# Patient Record
Sex: FEMALE | Race: WHITE | NOT HISPANIC OR LATINO | Employment: OTHER | URBAN - METROPOLITAN AREA
[De-identification: names, ages, dates, MRNs, and addresses within clinical notes are randomized per-mention and may not be internally consistent; named-entity substitution may affect disease eponyms.]

---

## 2018-02-27 ENCOUNTER — TELEPHONE (OUTPATIENT)
Dept: URGENT CARE | Facility: CLINIC | Age: 38
End: 2018-02-27

## 2018-02-27 ENCOUNTER — APPOINTMENT (OUTPATIENT)
Dept: CT IMAGING | Facility: HOSPITAL | Age: 38
End: 2018-02-27

## 2018-02-27 ENCOUNTER — HOSPITAL ENCOUNTER (EMERGENCY)
Facility: HOSPITAL | Age: 38
Discharge: HOME | End: 2018-02-27
Attending: EMERGENCY MEDICINE | Admitting: EMERGENCY MEDICINE

## 2018-02-27 VITALS
DIASTOLIC BLOOD PRESSURE: 97 MMHG | HEART RATE: 111 BPM | SYSTOLIC BLOOD PRESSURE: 142 MMHG | RESPIRATION RATE: 20 BRPM | HEIGHT: 70 IN | BODY MASS INDEX: 24.34 KG/M2 | TEMPERATURE: 98 F | OXYGEN SATURATION: 100 % | WEIGHT: 170 LBS

## 2018-02-27 DIAGNOSIS — H92.01 OTALGIA, RIGHT: Primary | ICD-10-CM

## 2018-02-27 LAB
ANION GAP SERPL CALCULATED.3IONS-SCNC: 9 MMOL/L (ref 3–11)
BASOPHILS # BLD AUTO: 0.02 10*3/MM3 (ref 0–0.2)
BASOPHILS NFR BLD AUTO: 0.3 % (ref 0–1)
BUN BLD-MCNC: 15 MG/DL (ref 9–23)
BUN/CREAT SERPL: 18.8 (ref 7–25)
CALCIUM SPEC-SCNC: 9.6 MG/DL (ref 8.7–10.4)
CHLORIDE SERPL-SCNC: 101 MMOL/L (ref 99–109)
CO2 SERPL-SCNC: 26 MMOL/L (ref 20–31)
CREAT BLD-MCNC: 0.8 MG/DL (ref 0.6–1.3)
DEPRECATED RDW RBC AUTO: 45.7 FL (ref 37–54)
EOSINOPHIL # BLD AUTO: 0.14 10*3/MM3 (ref 0–0.3)
EOSINOPHIL NFR BLD AUTO: 2.1 % (ref 0–3)
ERYTHROCYTE [DISTWIDTH] IN BLOOD BY AUTOMATED COUNT: 12.8 % (ref 11.3–14.5)
GFR SERPL CREATININE-BSD FRML MDRD: 81 ML/MIN/1.73
GLUCOSE BLD-MCNC: 76 MG/DL (ref 70–100)
HCT VFR BLD AUTO: 43.3 % (ref 34.5–44)
HGB BLD-MCNC: 14.6 G/DL (ref 11.5–15.5)
HOLD SPECIMEN: NORMAL
HOLD SPECIMEN: NORMAL
IMM GRANULOCYTES # BLD: 0.01 10*3/MM3 (ref 0–0.03)
IMM GRANULOCYTES NFR BLD: 0.2 % (ref 0–0.6)
LYMPHOCYTES # BLD AUTO: 1.61 10*3/MM3 (ref 0.6–4.8)
LYMPHOCYTES NFR BLD AUTO: 24.2 % (ref 24–44)
MCH RBC QN AUTO: 32.8 PG (ref 27–31)
MCHC RBC AUTO-ENTMCNC: 33.7 G/DL (ref 32–36)
MCV RBC AUTO: 97.3 FL (ref 80–99)
MONOCYTES # BLD AUTO: 0.45 10*3/MM3 (ref 0–1)
MONOCYTES NFR BLD AUTO: 6.8 % (ref 0–12)
NEUTROPHILS # BLD AUTO: 4.41 10*3/MM3 (ref 1.5–8.3)
NEUTROPHILS NFR BLD AUTO: 66.4 % (ref 41–71)
PLATELET # BLD AUTO: 269 10*3/MM3 (ref 150–450)
PMV BLD AUTO: 9.4 FL (ref 6–12)
POTASSIUM BLD-SCNC: 4.1 MMOL/L (ref 3.5–5.5)
RBC # BLD AUTO: 4.45 10*6/MM3 (ref 3.89–5.14)
SODIUM BLD-SCNC: 136 MMOL/L (ref 132–146)
WBC NRBC COR # BLD: 6.64 10*3/MM3 (ref 3.5–10.8)
WHOLE BLOOD HOLD SPECIMEN: NORMAL
WHOLE BLOOD HOLD SPECIMEN: NORMAL

## 2018-02-27 PROCEDURE — 70481 CT ORBIT/EAR/FOSSA W/DYE: CPT

## 2018-02-27 PROCEDURE — 25010000002 ONDANSETRON PER 1 MG: Performed by: EMERGENCY MEDICINE

## 2018-02-27 PROCEDURE — 96375 TX/PRO/DX INJ NEW DRUG ADDON: CPT

## 2018-02-27 PROCEDURE — 96374 THER/PROPH/DIAG INJ IV PUSH: CPT

## 2018-02-27 PROCEDURE — 85025 COMPLETE CBC W/AUTO DIFF WBC: CPT | Performed by: EMERGENCY MEDICINE

## 2018-02-27 PROCEDURE — 25010000002 FENTANYL CITRATE (PF) 100 MCG/2ML SOLUTION: Performed by: EMERGENCY MEDICINE

## 2018-02-27 PROCEDURE — 0 IOPAMIDOL 61 % SOLUTION: Performed by: EMERGENCY MEDICINE

## 2018-02-27 PROCEDURE — 80048 BASIC METABOLIC PNL TOTAL CA: CPT | Performed by: EMERGENCY MEDICINE

## 2018-02-27 PROCEDURE — 99283 EMERGENCY DEPT VISIT LOW MDM: CPT

## 2018-02-27 RX ORDER — SODIUM CHLORIDE 0.9 % (FLUSH) 0.9 %
10 SYRINGE (ML) INJECTION AS NEEDED
Status: DISCONTINUED | OUTPATIENT
Start: 2018-02-27 | End: 2018-02-27 | Stop reason: HOSPADM

## 2018-02-27 RX ORDER — HYDROCODONE BITARTRATE AND ACETAMINOPHEN 5; 325 MG/1; MG/1
1 TABLET ORAL EVERY 6 HOURS PRN
Qty: 10 TABLET | Refills: 0 | Status: SHIPPED | OUTPATIENT
Start: 2018-02-27 | End: 2018-03-08

## 2018-02-27 RX ORDER — ONDANSETRON 2 MG/ML
4 INJECTION INTRAMUSCULAR; INTRAVENOUS ONCE
Status: COMPLETED | OUTPATIENT
Start: 2018-02-27 | End: 2018-02-27

## 2018-02-27 RX ORDER — FENTANYL CITRATE 50 UG/ML
50 INJECTION, SOLUTION INTRAMUSCULAR; INTRAVENOUS ONCE
Status: COMPLETED | OUTPATIENT
Start: 2018-02-27 | End: 2018-02-27

## 2018-02-27 RX ADMIN — FENTANYL CITRATE 50 MCG: 50 INJECTION INTRAMUSCULAR; INTRAVENOUS at 15:49

## 2018-02-27 RX ADMIN — IOPAMIDOL 50 ML: 612 INJECTION, SOLUTION INTRAVENOUS at 16:22

## 2018-02-27 RX ADMIN — ONDANSETRON 4 MG: 2 INJECTION INTRAMUSCULAR; INTRAVENOUS at 15:49

## 2018-02-27 NOTE — TELEPHONE ENCOUNTER
PT. Phoned to state she isn't feeling any better and ask for BUC to make a referral to ENT. Per Pt insurance her PCP would have to make the referral. Pt has not established a PCP as of yet but does have an appt. Set for April. Pt was given info. To [place] [facility] @ [address]. Phone [phone]. To set her own appt.

## 2018-03-08 ENCOUNTER — OFFICE VISIT (OUTPATIENT)
Dept: INTERNAL MEDICINE | Facility: CLINIC | Age: 38
End: 2018-03-08

## 2018-03-08 VITALS
SYSTOLIC BLOOD PRESSURE: 162 MMHG | HEART RATE: 84 BPM | RESPIRATION RATE: 18 BRPM | WEIGHT: 191 LBS | DIASTOLIC BLOOD PRESSURE: 108 MMHG | TEMPERATURE: 97.9 F | HEIGHT: 70 IN | BODY MASS INDEX: 27.35 KG/M2

## 2018-03-08 DIAGNOSIS — I10 ESSENTIAL HYPERTENSION: Primary | ICD-10-CM

## 2018-03-08 LAB
ALBUMIN SERPL-MCNC: 4.2 G/DL (ref 3.2–4.8)
ALBUMIN/GLOB SERPL: 1.4 G/DL (ref 1.5–2.5)
ALP SERPL-CCNC: 73 U/L (ref 25–100)
ALT SERPL W P-5'-P-CCNC: 11 U/L (ref 7–40)
ANION GAP SERPL CALCULATED.3IONS-SCNC: 5 MMOL/L (ref 3–11)
ARTICHOKE IGE QN: 125 MG/DL (ref 0–130)
AST SERPL-CCNC: 14 U/L (ref 0–33)
BILIRUB SERPL-MCNC: 0.3 MG/DL (ref 0.3–1.2)
BUN BLD-MCNC: 13 MG/DL (ref 9–23)
BUN/CREAT SERPL: 16.3 (ref 7–25)
CALCIUM SPEC-SCNC: 9.1 MG/DL (ref 8.7–10.4)
CHLORIDE SERPL-SCNC: 106 MMOL/L (ref 99–109)
CHOLEST SERPL-MCNC: 178 MG/DL (ref 0–200)
CO2 SERPL-SCNC: 28 MMOL/L (ref 20–31)
CREAT BLD-MCNC: 0.8 MG/DL (ref 0.6–1.3)
GFR SERPL CREATININE-BSD FRML MDRD: 81 ML/MIN/1.73
GLOBULIN UR ELPH-MCNC: 2.9 GM/DL
GLUCOSE BLD-MCNC: 84 MG/DL (ref 70–100)
HDLC SERPL-MCNC: 64 MG/DL (ref 40–60)
POTASSIUM BLD-SCNC: 4.6 MMOL/L (ref 3.5–5.5)
PROT SERPL-MCNC: 7.1 G/DL (ref 5.7–8.2)
SODIUM BLD-SCNC: 139 MMOL/L (ref 132–146)
T4 FREE SERPL-MCNC: 1.2 NG/DL (ref 0.89–1.76)
TRIGL SERPL-MCNC: 122 MG/DL (ref 0–150)
TSH SERPL DL<=0.05 MIU/L-ACNC: 1.76 MIU/ML (ref 0.35–5.35)

## 2018-03-08 PROCEDURE — 99203 OFFICE O/P NEW LOW 30 MIN: CPT | Performed by: INTERNAL MEDICINE

## 2018-03-08 PROCEDURE — 84443 ASSAY THYROID STIM HORMONE: CPT | Performed by: INTERNAL MEDICINE

## 2018-03-08 PROCEDURE — 80061 LIPID PANEL: CPT | Performed by: INTERNAL MEDICINE

## 2018-03-08 PROCEDURE — 82088 ASSAY OF ALDOSTERONE: CPT | Performed by: INTERNAL MEDICINE

## 2018-03-08 PROCEDURE — 36415 COLL VENOUS BLD VENIPUNCTURE: CPT | Performed by: INTERNAL MEDICINE

## 2018-03-08 PROCEDURE — 84439 ASSAY OF FREE THYROXINE: CPT | Performed by: INTERNAL MEDICINE

## 2018-03-08 PROCEDURE — 84244 ASSAY OF RENIN: CPT | Performed by: INTERNAL MEDICINE

## 2018-03-08 PROCEDURE — 80053 COMPREHEN METABOLIC PANEL: CPT | Performed by: INTERNAL MEDICINE

## 2018-03-08 RX ORDER — ZIPRASIDONE HYDROCHLORIDE 40 MG/1
1 CAPSULE ORAL DAILY
Refills: 5 | COMMUNITY
Start: 2018-02-12 | End: 2022-10-25

## 2018-03-08 RX ORDER — DEXTROAMPHETAMINE SULFATE 30 MG/1
1 TABLET ORAL 2 TIMES DAILY
Refills: 0 | COMMUNITY
Start: 2018-02-15 | End: 2020-01-22

## 2018-03-08 RX ORDER — LOSARTAN POTASSIUM 50 MG/1
50 TABLET ORAL DAILY
Qty: 30 TABLET | Refills: 2 | Status: SHIPPED | OUTPATIENT
Start: 2018-03-08 | End: 2018-03-26 | Stop reason: SDUPTHER

## 2018-03-08 RX ORDER — VALACYCLOVIR HYDROCHLORIDE 1 G/1
1 TABLET, FILM COATED ORAL 3 TIMES DAILY PRN
COMMUNITY
Start: 2018-03-07 | End: 2018-05-31

## 2018-03-08 RX ORDER — ACYCLOVIR 50 MG/G
1 OINTMENT TOPICAL AS NEEDED
COMMUNITY
Start: 2018-03-07 | End: 2018-11-06 | Stop reason: SDUPTHER

## 2018-03-08 NOTE — PROGRESS NOTES
Chief Complaint   Patient presents with   • NEW PATIENT, [facility]     B/P RUNNING HIGH, COUGH       History of Present Illness    The patient presents for a follow-up related to hypertension. The patient reports that she has had no headaches, chest pain, dyspnea, edema, syncope, blurred vision or palpitations. She states that she is taking her medication as prescribed. She is not having medication side effects. She does  check her blood pressures at home.  The readings are elevated.  She denies excessive salt or caffeine.    Review of Systems    GENERAL- Denies Fever, Chills, Sweats, Weakness or Malaise.    HEENT- Denies Eye Pain, Eye Drainage, Nasal Discharge, Sore Throat, Ear Pain, Ear Drainage, Decreased Vision, Sinus Pain, Nasal Congestion, Decreased Hearing, Visual Disturbance, Diplopia or Tinnitus.    CARDIOVASCULAR- Denies Claudication.    GASTROINTESTINAL- Denies Abdominal Pain, Nausea, Vomiting, Diarrhea, Blood per Rectum, Constipation or Heartburn.    PULMONARY- Denies Wheezing, Sputum Production, Cough, Hemoptysis or Pleuritic Chest Pain.    ENDOCRINE- Denies Cold Intolerance, Depression, Fatigue, Hair Loss, Heat Intolerance, Memory Loss, Polydypsia, Polyphagia, Polyuria, Sleep Disturbance, Weight Gain or Weight Loss.    Medications      Current Outpatient Prescriptions:   •  acyclovir (ZOVIRAX) 5 % ointment, Apply 1 application topically As Needed., Disp: , Rfl:   •  Dextroamphetamine Sulfate (ZENZEDI PO), Take 1 tablet by mouth 2 (Two) Times a Day., Disp: , Rfl:   •  hydrochlorothiazide (HYDRODIURIL) 50 MG tablet, Take 50 mg by mouth Daily., Disp: , Rfl:   •  lisinopril (PRINIVIL,ZESTRIL) 20 MG tablet, Take 20 mg by mouth Daily., Disp: , Rfl:   •  PROAIR  (90 Base) MCG/ACT inhaler, Inhale 2 puffs Every 6 (Six) Hours As Needed., Disp: , Rfl:   •  valACYclovir (VALTREX) 1000 MG tablet, Take 1 tablet by mouth 3 (Three) Times a Day As Needed., Disp: , Rfl:   •  ZENZEDI 30 MG tablet, Take 1  "tablet by mouth 2 (Two) Times a Day., Disp: , Rfl: 0  •  ziprasidone (GEODON) 40 MG capsule, Take 1 capsule by mouth 2 (Two) Times a Day., Disp: , Rfl: 5  •  fluticasone (FLONASE) 50 MCG/ACT nasal spray, 2 sprays into each nostril Daily. (Patient taking differently: 2 sprays into each nostril Daily As Needed.), Disp: 1 bottle, Rfl: 0     Allergies    Allergies   Allergen Reactions   • Lamictal [Lamotrigine] Anaphylaxis   • Rocephin [Ceftriaxone] Hives   • Tetracyclines & Related Other (See Comments)     Low h&h       Problem List    There is no problem list on file for this patient.    Past Medical History:   Diagnosis Date   • ADHD (attention deficit hyperactivity disorder)    • Asthma    • Bipolar 1 disorder    • Hemorrhage    • Herpes    • Hypertension    • Migraine    • Urinary tract infection      Past Surgical History:   Procedure Laterality Date   • CHOLECYSTECTOMY     • HYSTERECTOMY     • OOPHORECTOMY  2015   • ROTATOR CUFF REPAIR     • ULNAR NERVE REPAIR       Social History     Social History   • Marital status:      Social History Main Topics   • Smoking status: Current Every Day Smoker     Start date: 1998   • Smokeless tobacco: Never Used   • Alcohol use No   • Drug use: Unknown     Other Topics Concern   • Not on file       Medications, Allergies, Problems List and Past History were reviewed and updated.    Physical Examination    BP (!) 162/108 (BP Location: Left arm, Patient Position: Sitting, Cuff Size: Adult)  Pulse 84  Temp 97.9 °F (36.6 °C) (Temporal Artery )   Resp 18  Ht 176.5 cm (69.5\")  Wt 86.6 kg (191 lb)  LMP  (LMP Unknown) Comment: NO PAP SINCE HYSTERECTOMY  Breastfeeding? No  BMI 27.8 kg/m2    HEENT: Head- Normocephalic Atraumatic. Facies- Within normal limits. Pinnas- Normal texture and shape bilaterally. Canals- Normal bilaterally. TMs- Normal bilaterally. Nares- Patent bilaterally. Nasal Septum- is normal. There is no tenderness to palpation over the frontal or maxillary " sinuses. Lids- Normal bilaterally. Conjunctiva- Clear bilaterally. Sclera- Anicteric bilaterally. Oropharynx- Moist with no lesions. Tonsils- No enlargement, erythema or exudate.    Neck: Thyroid- non enlarged, symmetric and has no nodules. No bruits are detected. ROM- Normal Range of Motion with no rigidity.    Lungs: Auscultation- Clear to auscultation bilaterally. There are no retractions, clubbing or cyanosis. The Expiratory to Inspiratory ratio is equal.    Cardiovascular: There are no carotid bruits. Heart- Normal Rate with Regular rhythm and no murmurs. There are no gallops. There are no rubs. In the lower extremities there is no edema. The upper extremities do not have edema.    Abdomen: Soft, benign, non-tender with no masses, hernias, organomegaly or scars.    Impression and Assessment    Essential Hypertension.    Plan    Essential Hypertension Plan: She was encouraged to decrease caffeine intake. Instructions were given to eat a low salt diet. The medications were adjusted as noted.    Bebe was seen today for new patient, establish care.    Diagnoses and all orders for this visit:    Essential hypertension  -     Comprehensive Metabolic Panel  -     Lipid Panel  -     TSH  -     T4, Free  -     Renin Direct Assay  -     Aldosterone  -     losartan (COZAAR) 50 MG tablet; Take 1 tablet by mouth Daily.        Return to Office    The patient was instructed to return for follow-up in 2 weeks.    The patient was instructed to return sooner if the condition changes, worsens, or doesn't resolve.

## 2018-03-12 PROBLEM — I10 ESSENTIAL HYPERTENSION: Status: ACTIVE | Noted: 2018-03-12

## 2018-03-12 LAB — RENIN PLAS-CCNC: 1.22 NG/ML/HR (ref 0.17–5.38)

## 2018-03-13 LAB — ALDOST SERPL-MCNC: 5.9 NG/DL (ref 0–30)

## 2018-03-26 ENCOUNTER — HOSPITAL ENCOUNTER (OUTPATIENT)
Dept: GENERAL RADIOLOGY | Facility: HOSPITAL | Age: 38
Discharge: HOME | End: 2018-03-26
Attending: INTERNAL MEDICINE | Admitting: INTERNAL MEDICINE

## 2018-03-26 ENCOUNTER — OFFICE VISIT (OUTPATIENT)
Dept: INTERNAL MEDICINE | Facility: CLINIC | Age: 38
End: 2018-03-26

## 2018-03-26 ENCOUNTER — TELEPHONE (OUTPATIENT)
Dept: INTERNAL MEDICINE | Facility: CLINIC | Age: 38
End: 2018-03-26

## 2018-03-26 VITALS
TEMPERATURE: 97.7 F | HEART RATE: 76 BPM | DIASTOLIC BLOOD PRESSURE: 100 MMHG | RESPIRATION RATE: 16 BRPM | WEIGHT: 192 LBS | SYSTOLIC BLOOD PRESSURE: 156 MMHG | BODY MASS INDEX: 27.95 KG/M2

## 2018-03-26 DIAGNOSIS — R05.9 COUGH: ICD-10-CM

## 2018-03-26 DIAGNOSIS — I10 ESSENTIAL HYPERTENSION: Primary | ICD-10-CM

## 2018-03-26 DIAGNOSIS — J20.9 ACUTE BRONCHITIS, UNSPECIFIED ORGANISM: ICD-10-CM

## 2018-03-26 PROCEDURE — 99214 OFFICE O/P EST MOD 30 MIN: CPT | Performed by: INTERNAL MEDICINE

## 2018-03-26 PROCEDURE — 71046 X-RAY EXAM CHEST 2 VIEWS: CPT

## 2018-03-26 RX ORDER — AZITHROMYCIN 250 MG/1
TABLET, FILM COATED ORAL
Qty: 6 TABLET | Refills: 0 | Status: SHIPPED | OUTPATIENT
Start: 2018-03-26 | End: 2018-03-26

## 2018-03-26 RX ORDER — SULFAMETHOXAZOLE AND TRIMETHOPRIM 800; 160 MG/1; MG/1
1 TABLET ORAL 2 TIMES DAILY
Qty: 20 TABLET | Refills: 0 | Status: SHIPPED | OUTPATIENT
Start: 2018-03-26 | End: 2018-04-05

## 2018-03-26 RX ORDER — HYDROCHLOROTHIAZIDE 50 MG/1
50 TABLET ORAL DAILY
Qty: 30 TABLET | Refills: 5 | Status: SHIPPED | OUTPATIENT
Start: 2018-03-26 | End: 2018-12-26 | Stop reason: SDUPTHER

## 2018-03-26 RX ORDER — LOSARTAN POTASSIUM 50 MG/1
75 TABLET ORAL DAILY
Qty: 45 TABLET | Refills: 2 | Status: SHIPPED | OUTPATIENT
Start: 2018-03-26 | End: 2018-09-12

## 2018-03-26 RX ORDER — BENZONATATE 200 MG/1
200 CAPSULE ORAL 3 TIMES DAILY PRN
Qty: 30 CAPSULE | Refills: 0 | Status: SHIPPED | OUTPATIENT
Start: 2018-03-26 | End: 2018-04-17

## 2018-03-26 RX ORDER — BROMPHENIRAMINE MALEATE, PSEUDOEPHEDRINE HYDROCHLORIDE, AND DEXTROMETHORPHAN HYDROBROMIDE 2; 30; 10 MG/5ML; MG/5ML; MG/5ML
5 SYRUP ORAL 4 TIMES DAILY PRN
Qty: 118 ML | Refills: 0 | Status: SHIPPED | OUTPATIENT
Start: 2018-03-26 | End: 2018-04-17

## 2018-03-26 NOTE — TELEPHONE ENCOUNTER
Change to Bromfed DM 1 tsp po QID prn cough and congestion.  Disp 1 bottle  NR  Luis Whitman MD  1:27 PM  03/26/18

## 2018-03-26 NOTE — PROGRESS NOTES
Chief Complaint   Patient presents with   • Follow-up     2 WEEK FOLLOW UP B/P       History of Present Illness      The patient presents for a follow-up related to hypertension. The patient reports that she has had no headaches, chest pain, dyspnea, edema, syncope, blurred vision or palpitations. She states that she is taking her medication as prescribed. She is not having medication side effects. She states that she is under a lot of stress.    The patient presents with a 2 month history of a productive cough. The sputum is thick and yellow. There are no other associated symptoms, including wheezing, fever, facial pain, eye drainage, ear pain, ear drainage, nasal congestion, rhinorrhea, nausea, vomiting, diarrhea, chills, decreased appetite, abdominal pain, sore throat, myalgias or a rash. The patient has not had hemoptysis. The patient is not exposed to cigarette smoke. The patient has not tried anything for treatment of this illness.     Review of Systems    GENERAL- Denies Unexplained Weight Loss, Sweats, Fatigue, Weakness or Malaise.    CARDIOVASCULAR- Denies Claudication.    GASTROINTESTINAL- Denies Blood per Rectum, Constipation or Heartburn.    PULMONARY- Reports: Cough. Denies: Sputum Production.    Medications      Current Outpatient Prescriptions:   •  acyclovir (ZOVIRAX) 5 % ointment, Apply 1 application topically As Needed., Disp: , Rfl:   •  fluticasone (FLONASE) 50 MCG/ACT nasal spray, 2 sprays into each nostril Daily. (Patient taking differently: 2 sprays into each nostril Daily As Needed.), Disp: 1 bottle, Rfl: 0  •  hydrochlorothiazide (HYDRODIURIL) 50 MG tablet, Take 50 mg by mouth Daily., Disp: , Rfl:   •  losartan (COZAAR) 50 MG tablet, Take 1 tablet by mouth Daily., Disp: 30 tablet, Rfl: 2  •  PROAIR  (90 Base) MCG/ACT inhaler, Inhale 2 puffs Every 6 (Six) Hours As Needed., Disp: , Rfl:   •  valACYclovir (VALTREX) 1000 MG tablet, Take 1 tablet by mouth 3 (Three) Times a Day As Needed.,  Disp: , Rfl:   •  ZENZEDI 30 MG tablet, Take 1 tablet by mouth 2 (Two) Times a Day., Disp: , Rfl: 0  •  ziprasidone (GEODON) 40 MG capsule, Take 1 capsule by mouth 2 (Two) Times a Day., Disp: , Rfl: 5     Allergies    Allergies   Allergen Reactions   • Lamictal [Lamotrigine] Anaphylaxis   • Rocephin [Ceftriaxone] Hives   • Tetracyclines & Related Other (See Comments)     Low h&h       Problem List    Patient Active Problem List   Diagnosis   • Essential hypertension       Medications, Allergies, Problems List and Past History were reviewed and updated.    Physical Examination    /100 (BP Location: Left arm, Patient Position: Sitting, Cuff Size: Adult)   Pulse 76   Temp 97.7 °F (36.5 °C) (Temporal Artery )   Resp 16   Wt 87.1 kg (192 lb)   LMP  (LMP Unknown) Comment: NO PAP SINCE HYSTERECTOMY  Breastfeeding? No   BMI 27.95 kg/m²     HEENT: Head- Normocephalic Atraumatic. Facies- Within normal limits. Pinnas- Normal texture and shape bilaterally. Canals- Normal bilaterally. TMs- Normal bilaterally. Nares- Patent bilaterally. Nasal Septum- is normal. There is no tenderness to palpation over the frontal or maxillary sinuses. Lids- Normal bilaterally. Conjunctiva- Clear bilaterally. Sclera- Anicteric bilaterally. Oropharynx- Moist with no lesions. Tonsils- No enlargement, erythema or exudate.    Neck: Thyroid- non enlarged, symmetric and has no nodules. No bruits are detected. ROM- Normal Range of Motion with no rigidity.    Lymph Nodes: Cervical- no enlarged lymph nodes noted. Clavicular- Deferred. Axillary- Deferred. Inguinal- Deferred.    Lungs: Auscultation- Clear to auscultation bilaterally. There are no retractions, clubbing or cyanosis. The Expiratory to Inspiratory ratio is equal.    Cardiovascular: There are no carotid bruits. Heart- Normal Rate with Regular rhythm and no murmurs. There are no gallops. There are no rubs. In the lower extremities there is no edema. The upper extremities do not have  edema.    Abdomen: Soft, benign, non-tender with no masses, hernias, organomegaly or scars.    Radiology    My personal interpretation of the x-rays is as stated below:    Chest X-ray (PA and Lateral) 03/26/2018 : The CXR is normal with no infiltrates, atelectasis, cardiomegaly or effusions.    Impression and Assessment    Essential Hypertension.    Acute Bronchitis.    Plan    Essential Hypertension Plan: The medications were adjusted as noted.    Acute Bronchitis Plan: A cool mist humidifier was encouraged. Over the counter mucolytic agents were encouraged. She was encouraged to drink plenty of fluids. The patient was encouraged to stop smoking. Medication will be added as noted below.    Bebe was seen today for follow-up.    Diagnoses and all orders for this visit:    Essential hypertension  -     hydrochlorothiazide (HYDRODIURIL) 50 MG tablet; Take 1 tablet by mouth Daily.  -     losartan (COZAAR) 50 MG tablet; Take 1.5 tablets by mouth Daily.    Acute bronchitis, unspecified organism  -     XR Chest PA & Lateral; Future  -     azithromycin (ZITHROMAX Z-JEROME) 250 MG tablet; Take 2 tablets the first day, then 1 tablet daily for 4 days.  -     benzonatate (TESSALON) 200 MG capsule; Take 1 capsule by mouth 3 (Three) Times a Day As Needed for Cough.        Return to Office    The patient was instructed to return for follow-up in 1 month.    The patient was instructed to return sooner if the condition changes, worsens, or doesn't resolve.

## 2018-03-26 NOTE — TELEPHONE ENCOUNTER
----- Message from Silvina Govea sent at 3/26/2018 12:43 PM EDT -----  [phone]    PT WAS JUST SEEN TODAY-RENE MCCOYT COVERED-CAN YOU CHANGE MEDS?    [facility]

## 2018-04-16 ENCOUNTER — TELEPHONE (OUTPATIENT)
Dept: INTERNAL MEDICINE | Facility: CLINIC | Age: 38
End: 2018-04-16

## 2018-04-16 NOTE — TELEPHONE ENCOUNTER
Incoming call:  RETURNING CALL, NOTIFIED HER PER DR RIBEIRO TO INCREASE COZAAR TO 100MG. SHE SAID SHE HAS ALREADY BEEN TAKING MUCINEX FOR HER COUGH.   SCHEDULED APPT FOR MAMMO REFERRAL FOR TOMORROW     Noted. Nothing else further needed at this time. Message will be closed, pt is being seen tomorrow.

## 2018-04-16 NOTE — PROGRESS NOTES
"Subjective:    Bebe Rincon is a 38 y.o. female.     Chief Complaint   Patient presents with   • Cough     x 2 WEEKS   • TENDER LUMP LEFT BREAST       History of Present Illness   PATIENT COMPLAINS OF BRONCHITIS X 2 MONTHS WITH 2 TREATMENTS OF ANTIBIOTICS AND COUGH SYRUP. REPORTS SHE USUALLY SMOKES BUT HAS BEEN UNABLE TO SMOKE AS MUCH. SMOKING WORSENS COUGH. CURRENT STRESS LEVEL INCREASES DESIRE TO SMOKE.     PATIENT REPORTS OVER WEEKEND  LEFT OUTER BREAST HAD A BURNING ITCH AND SHE FELT A \"THICKENING\". REPORTS NO CAFFEINE INTAKE.     PATIENT COMPLAINS OF HTN, YEARS OF DURATION, ABOUT 16 AND HAS BEEN CONTROLLED BUT ELEVATED RECENTLY AND MEDICATION CHANGES MADE YESTERDAY. WORSENED BY CURRENT STRESS/ATTENDING COURT THIS MORNING AND TAKING SUDAFED.       Current Outpatient Prescriptions:   •  acyclovir (ZOVIRAX) 5 % ointment, Apply 1 application topically As Needed., Disp: , Rfl:   •  fluticasone (FLONASE) 50 MCG/ACT nasal spray, 2 sprays into each nostril Daily. (Patient taking differently: 2 sprays into each nostril Daily As Needed.), Disp: 1 bottle, Rfl: 0  •  hydrochlorothiazide (HYDRODIURIL) 50 MG tablet, Take 1 tablet by mouth Daily., Disp: 30 tablet, Rfl: 5  •  losartan (COZAAR) 50 MG tablet, Take 1.5 tablets by mouth Daily. (Patient taking differently: Take 50 mg by mouth 2 (Two) Times a Day.), Disp: 45 tablet, Rfl: 2  •  PROAIR  (90 Base) MCG/ACT inhaler, Inhale 2 puffs Every 6 (Six) Hours As Needed., Disp: , Rfl:   •  valACYclovir (VALTREX) 1000 MG tablet, Take 1 tablet by mouth 3 (Three) Times a Day As Needed., Disp: , Rfl:   •  ZENZEDI 30 MG tablet, Take 1 tablet by mouth 2 (Two) Times a Day., Disp: , Rfl: 0  •  ziprasidone (GEODON) 40 MG capsule, Take 1 capsule by mouth 2 (Two) Times a Day., Disp: , Rfl: 5  •  azithromycin (ZITHROMAX Z-JEROME) 250 MG tablet, Take 2 tablets the first day, then 1 tablet daily for 4 days., Disp: 6 tablet, Rfl: 0  No current facility-administered medications for this " "visit.      The following portions of the patient's history were reviewed and updated as appropriate: allergies, current medications, past family history, past medical history, past social history, past surgical history and problem list.    Review of Systems   Constitutional: Negative for chills, fatigue and fever.   HENT: Negative for congestion, dental problem, mouth sores, nosebleeds, postnasal drip, rhinorrhea, sinus pain, sinus pressure, sneezing and sore throat.    Eyes: Negative for pain, discharge, redness and itching.   Respiratory: Positive for cough. Negative for shortness of breath and wheezing.         \"Bronchitis\" that has not resolved   Cardiovascular: Negative for chest pain, palpitations and leg swelling.        No BEJARANO, orthopnea, PND, or claudication.   Gastrointestinal: Negative for abdominal distention, abdominal pain, blood in stool, diarrhea, nausea and vomiting.   Endocrine: Negative for cold intolerance, heat intolerance, polydipsia and polyuria.   Genitourinary: Negative for difficulty urinating, dysuria, frequency, hematuria and urgency.   Musculoskeletal: Negative for arthralgias, gait problem, joint swelling and myalgias.   Skin: Negative for color change, rash and wound.        Left outer breast itchy and tender with area of \"thickening\"   Allergic/Immunologic: Negative.    Neurological: Negative for dizziness, syncope, weakness, light-headedness, numbness and headaches.   Hematological: Negative for adenopathy. Does not bruise/bleed easily.   Psychiatric/Behavioral: Negative.        Objective:    BP (!) 158/102   Pulse 84   Temp 98.2 °F (36.8 °C) (Temporal Artery )   Resp 18   Wt 90.3 kg (199 lb)   LMP  (LMP Unknown) Comment: NO PAP SINCE HYSTERECTOMY  Breastfeeding? No   BMI 28.97 kg/m²     Physical Exam   Constitutional: She is oriented to person, place, and time. She appears well-developed and well-nourished. She is cooperative. She is easily aroused.  Non-toxic appearance. " She does not have a sickly appearance. She does not appear ill. No distress.   HENT:   Head: Normocephalic and atraumatic. Head is without abrasion. Hair is normal.   Right Ear: Hearing, tympanic membrane, external ear and ear canal normal. No foreign bodies. Tympanic membrane is not perforated and not erythematous.   Left Ear: Hearing, tympanic membrane, external ear and ear canal normal. No foreign bodies. Tympanic membrane is not perforated and not erythematous.   Nose: Nose normal. No mucosal edema, rhinorrhea or septal deviation. No epistaxis.  No foreign bodies.   Mouth/Throat: Oropharynx is clear and moist. No oral lesions. Normal dentition.   Eyes: Conjunctivae and lids are normal. Pupils are equal, round, and reactive to light. Right eye exhibits no discharge. Left eye exhibits no discharge. Right conjunctiva is not injected. Left conjunctiva is not injected. No scleral icterus.   Neck: Normal range of motion and full passive range of motion without pain. Neck supple. No edema and normal range of motion present. No thyroid mass and no thyromegaly present.   Cardiovascular: Normal rate, regular rhythm and normal heart sounds.  Exam reveals no gallop and no friction rub.    No murmur heard.  Pulmonary/Chest: Effort normal and breath sounds normal. No accessory muscle usage. She has no rhonchi. She has no rales. She exhibits tenderness. Right breast exhibits no inverted nipple, no mass, no nipple discharge, no skin change and no tenderness. Left breast exhibits skin change and tenderness. Left breast exhibits no inverted nipple and no nipple discharge.   Non productive cough, Healed abrasion on left outer breast from scratching. Mobile, tender nodule left outer quadrant 1cm area, bilateral breasts with dense tissue   Abdominal: Soft. Bowel sounds are normal. She exhibits no distension. There is no hepatosplenomegaly or hepatomegaly. There is no tenderness. There is no CVA tenderness.   Musculoskeletal: Normal  range of motion. She exhibits no edema, tenderness or deformity.   Lymphadenopathy:     She has no cervical adenopathy.   Neurological: She is alert, oriented to person, place, and time and easily aroused. She has normal reflexes. No cranial nerve deficit. Coordination normal.   Muscle strength 5/5 and equal throughout.   Skin: Skin is warm, dry and intact. No abrasion and no rash noted. She is not diaphoretic. No cyanosis or erythema. Nails show no clubbing.   Psychiatric: She has a normal mood and affect. Her speech is normal and behavior is normal.   Nursing note and vitals reviewed.      Assessment/Plan:    Bebe was seen today for cough and tender lump left breast.    Diagnoses and all orders for this visit:    Essential hypertension  -     CloNIDine (CATAPRES) tablet 0.1 mg; Take 1 tablet by mouth 1 (One) Time.  Discussed to not take decongestants and to stop sudafed.  Painful lumpy left breast  -     Mammo Diagnostic Bilateral With CAD; Future  Discussed SBE   Cough  -     azithromycin (ZITHROMAX Z-JEROME) 250 MG tablet; Take 2 tablets the first day, then 1 tablet daily for 4 days.    Consider smoking cessation. Monitor for fever or change in cough.    Return if symptoms worsen or fail to improve.

## 2018-04-16 NOTE — TELEPHONE ENCOUNTER
Increase Cozaar to 100 mg daily.  If blood pressure does not come down, need to go to the ER.    Would recommend adding Mucinex for the cough.    Needs appointment with Dr. Tamayo to evaluate the breast lump before a mammogram can be scheduled.  Please schedule appointment with him or an extender.

## 2018-04-16 NOTE — TELEPHONE ENCOUNTER
----- Message from Christie Alexander sent at 4/16/2018  9:03 AM EDT -----  Contact: SELF  ANKIT ABRAMS WAS IN ON 3/26/18 AND PRESCRIBED SOMETHING COUGH BUT IT HAS NOT CLEARED UP. HER BLOOD PRESSURE MEDICATION WAS ALSO INCREASED AND IT IS NOT WORKING AS THIS MORNING HER PRESSURE /122. SHE SAID WITH THAT THE ONLY SYMPTOM SHE HAD WITH THAT WAS SPOTTY VISION. SHE ALSO NOTICED A THICKENING ON A NODULE ON HER LEFT BREAST AND WANTS TO KNOW IF SHE CAN GET AN ORDER FOR A MAMMOGRAM. ANKIT CAN BE REACHED [phone]    SHE IS AT [facility] NOW TO RECHECK HER BLOOD PRESSURE

## 2018-04-17 ENCOUNTER — OFFICE VISIT (OUTPATIENT)
Dept: INTERNAL MEDICINE | Facility: CLINIC | Age: 38
End: 2018-04-17

## 2018-04-17 VITALS
SYSTOLIC BLOOD PRESSURE: 158 MMHG | WEIGHT: 199 LBS | TEMPERATURE: 98.2 F | RESPIRATION RATE: 18 BRPM | DIASTOLIC BLOOD PRESSURE: 102 MMHG | BODY MASS INDEX: 28.97 KG/M2 | HEART RATE: 84 BPM

## 2018-04-17 DIAGNOSIS — R05.9 COUGH: ICD-10-CM

## 2018-04-17 DIAGNOSIS — I10 ESSENTIAL HYPERTENSION: Primary | ICD-10-CM

## 2018-04-17 DIAGNOSIS — N64.4 PAINFUL LUMPY LEFT BREAST: ICD-10-CM

## 2018-04-17 DIAGNOSIS — N63.20 PAINFUL LUMPY LEFT BREAST: ICD-10-CM

## 2018-04-17 PROCEDURE — 99214 OFFICE O/P EST MOD 30 MIN: CPT | Performed by: NURSE PRACTITIONER

## 2018-04-17 RX ORDER — AZITHROMYCIN 250 MG/1
TABLET, FILM COATED ORAL
Qty: 6 TABLET | Refills: 0 | Status: SHIPPED | OUTPATIENT
Start: 2018-04-17 | End: 2018-04-22

## 2018-04-17 RX ORDER — CLONIDINE HYDROCHLORIDE 0.1 MG/1
0.1 TABLET ORAL ONCE
Status: COMPLETED | OUTPATIENT
Start: 2018-04-17 | End: 2018-04-17

## 2018-04-17 RX ADMIN — CLONIDINE HYDROCHLORIDE 0.1 MG: 0.1 TABLET ORAL at 10:05

## 2018-05-03 ENCOUNTER — HOSPITAL ENCOUNTER (OUTPATIENT)
Dept: ULTRASOUND IMAGING | Facility: HOSPITAL | Age: 38
Discharge: HOME | End: 2018-05-03

## 2018-05-03 ENCOUNTER — HOSPITAL ENCOUNTER (OUTPATIENT)
Dept: MAMMOGRAPHY | Facility: HOSPITAL | Age: 38
Discharge: HOME | End: 2018-05-03
Admitting: NURSE PRACTITIONER

## 2018-05-03 DIAGNOSIS — N64.4 PAINFUL LUMPY LEFT BREAST: ICD-10-CM

## 2018-05-03 DIAGNOSIS — N63.20 PAINFUL LUMPY LEFT BREAST: ICD-10-CM

## 2018-05-03 PROCEDURE — 77066 DX MAMMO INCL CAD BI: CPT | Performed by: RADIOLOGY

## 2018-05-03 PROCEDURE — G0279 TOMOSYNTHESIS, MAMMO: HCPCS

## 2018-05-03 PROCEDURE — 77066 DX MAMMO INCL CAD BI: CPT

## 2018-05-03 PROCEDURE — G0279 TOMOSYNTHESIS, MAMMO: HCPCS | Performed by: RADIOLOGY

## 2018-05-03 PROCEDURE — 76642 ULTRASOUND BREAST LIMITED: CPT

## 2018-05-03 PROCEDURE — 76642 ULTRASOUND BREAST LIMITED: CPT | Performed by: RADIOLOGY

## 2018-05-30 ENCOUNTER — OFFICE VISIT (OUTPATIENT)
Dept: INTERNAL MEDICINE | Facility: CLINIC | Age: 38
End: 2018-05-30

## 2018-05-30 DIAGNOSIS — I10 ESSENTIAL HYPERTENSION: ICD-10-CM

## 2018-05-30 DIAGNOSIS — L30.9 ECZEMA, UNSPECIFIED TYPE: ICD-10-CM

## 2018-05-30 DIAGNOSIS — N63.0 BREAST MASS: Primary | ICD-10-CM

## 2018-05-30 PROCEDURE — 99214 OFFICE O/P EST MOD 30 MIN: CPT | Performed by: INTERNAL MEDICINE

## 2018-05-30 RX ORDER — AMLODIPINE BESYLATE 5 MG/1
5 TABLET ORAL DAILY
Qty: 30 TABLET | Refills: 2 | Status: SHIPPED | OUTPATIENT
Start: 2018-05-30 | End: 2018-09-12

## 2018-05-31 ENCOUNTER — TELEPHONE (OUTPATIENT)
Dept: INTERNAL MEDICINE | Facility: CLINIC | Age: 38
End: 2018-05-31

## 2018-05-31 RX ORDER — VALACYCLOVIR HYDROCHLORIDE 1 G/1
1000 TABLET, FILM COATED ORAL 3 TIMES DAILY
Qty: 30 TABLET | Refills: 0 | Status: SHIPPED | OUTPATIENT
Start: 2018-05-31 | End: 2018-11-06 | Stop reason: SDUPTHER

## 2018-06-03 VITALS
HEART RATE: 98 BPM | BODY MASS INDEX: 29.11 KG/M2 | TEMPERATURE: 99 F | DIASTOLIC BLOOD PRESSURE: 78 MMHG | WEIGHT: 200 LBS | SYSTOLIC BLOOD PRESSURE: 176 MMHG | RESPIRATION RATE: 22 BRPM

## 2018-06-03 NOTE — PROGRESS NOTES
Chief Complaint   Patient presents with   • Essential hypertension       History of Present Illness    The patient presents for the follow-up of a three month history of a left sided breast mass. The area of concern in the left breast is located in the upper outer quadrant. The patient denies nipple discharge. The patient denies the possibility of pregnancy. The patient reports excessive caffeine intake. There is not a family history of breast cancer. She states that the breast mass has not changed in size.    The patient presents for a follow-up related to hypertension. The patient reports that she has had no headaches, chest pain, dyspnea, edema, syncope, blurred vision or palpitations. She states that she is taking her medication as prescribed. She is not having medication side effects.    She presents for an initial evaluation with a rash on the lateral side of the left breast and the lateral side of the right breast. This has been present for one week. The condition is non-painful, scaling and itchy. There are no exposures to people with similar lesions. There is no history of new cosmetic or detergent usage on the affected area. No prior treatment has been administered. The patient denies a dry cough, a wet cough, wheezing, facial pain, eye drainage, ear pain, ear drainage or nasal discharge.    Review of Systems    GENERAL/CONSTITUTIONAL- Denies Unexplained Weight Loss, Fever, Chills, Sweats, Fatigue, Weakness or Malaise.    CARDIOVASCULAR- Denies Claudication.    PULMONARY- Denies Sputum Production, Cough, Hemoptysis or Pleuritic Chest Pain.    INTEGUMENTARY- Reports: Rash and Itching. Denies: Lumps, Sores, Dryness, Color Change, Changes in Hair, Brittle Nails, Discolored Nails, Thickened Nails, Hair Loss, Growths or Alopecia.    Medications      Current Outpatient Prescriptions:   •  acyclovir (ZOVIRAX) 5 % ointment, Apply 1 application topically As Needed., Disp: , Rfl:   •  fluticasone (FLONASE) 50  MCG/ACT nasal spray, 2 sprays into each nostril Daily. (Patient taking differently: 2 sprays into each nostril Daily As Needed.), Disp: 1 bottle, Rfl: 0  •  hydrochlorothiazide (HYDRODIURIL) 50 MG tablet, Take 1 tablet by mouth Daily., Disp: 30 tablet, Rfl: 5  •  losartan (COZAAR) 50 MG tablet, Take 1.5 tablets by mouth Daily. (Patient taking differently: Take 50 mg by mouth 2 (Two) Times a Day.), Disp: 45 tablet, Rfl: 2  •  PROAIR  (90 Base) MCG/ACT inhaler, Inhale 2 puffs Every 6 (Six) Hours As Needed., Disp: , Rfl:   •  ZENZEDI 30 MG tablet, Take 1 tablet by mouth 2 (Two) Times a Day., Disp: , Rfl: 0  •  ziprasidone (GEODON) 40 MG capsule, Take 1 capsule by mouth 2 (Two) Times a Day., Disp: , Rfl: 5  •  valACYclovir (VALTREX) 1000 MG tablet, Take 1 tablet by mouth 3 (Three) Times a Day., Disp: 30 tablet, Rfl: 0     Allergies    Allergies   Allergen Reactions   • Lamictal [Lamotrigine] Anaphylaxis   • Rocephin [Ceftriaxone] Hives   • Tetracyclines & Related Other (See Comments)     Low h&h       Problem List    Patient Active Problem List   Diagnosis   • Essential hypertension       Medications, Allergies, Problems List and Past History were reviewed and updated.    Physical Examination    /78   Pulse 98   Temp 99 °F (37.2 °C) (Temporal Artery )   Resp 22   Wt 90.7 kg (200 lb)   LMP  (LMP Unknown) Comment: NO PAP SINCE HYSTERECTOMY  BMI 29.11 kg/m²     Neck: Thyroid- non enlarged, symmetric and has no nodules. No bruits are detected. ROM- Normal Range of Motion with no rigidity.    Lymph Nodes: Cervical- no enlarged lymph nodes noted. Clavicular- Deferred. Axillary- no enlarged axillary lymph nodes noted. Inguinal- Deferred.    Lungs: Auscultation- Clear to auscultation bilaterally. There are no retractions, clubbing or cyanosis. The Expiratory to Inspiratory ratio is equal.    Cardiovascular: There are no carotid bruits. Heart- Normal Rate with Regular rhythm and no murmurs. There are no  gallops. There are no rubs. In the lower extremities there is no edema. The upper extremities do not have edema.    Abdomen: Soft, benign, non-tender with no masses, hernias, organomegaly or scars.    Breast: Examination reveals that the right breast has normal contours with no masses, discharge, skin changes, or lumps and the left breast has a mass in the upper outer quadrant. There are no scars noted.    The patient has a rash on the lateral side of the left breast and the lateral side of the right breast. The rash is pink. The affected skin is scaled and thickened and the condition is noted to be well demarcated.    Impression and Assessment    Eczema.    Breast Mass.    Essential Hypertension.    Plan    Essential Hypertension Plan: Instructions were given to eat a low salt diet. She was encouraged to exercise daily. The medications were changed as noted.    Eczema Plan: Medication was added as noted below.    Breast Mass Plan: She was referred to surgery.    Bebe was seen today for essential hypertension.    Diagnoses and all orders for this visit:    Breast mass  -     Ambulatory Referral to General Surgery    Essential hypertension  -     amLODIPine (NORVASC) 5 MG tablet; Take 1 tablet by mouth Daily.    Eczema, unspecified type  -     triamcinolone (KENALOG) 0.1 % ointment; Apply  topically 2 (Two) Times a Day.        Return to Office    The patient was instructed to return for follow-up in 1 month.    The patient was instructed to return sooner if the condition changes, worsens, or doesn't resolve.

## 2018-06-15 ENCOUNTER — OFFICE VISIT (OUTPATIENT)
Dept: INTERNAL MEDICINE | Facility: CLINIC | Age: 38
End: 2018-06-15

## 2018-06-15 VITALS
BODY MASS INDEX: 28.92 KG/M2 | HEART RATE: 96 BPM | RESPIRATION RATE: 16 BRPM | OXYGEN SATURATION: 100 % | DIASTOLIC BLOOD PRESSURE: 94 MMHG | WEIGHT: 202 LBS | HEIGHT: 70 IN | TEMPERATURE: 97.2 F | SYSTOLIC BLOOD PRESSURE: 162 MMHG

## 2018-06-15 DIAGNOSIS — L29.9 ITCHING: ICD-10-CM

## 2018-06-15 DIAGNOSIS — R21 RASH AND NONSPECIFIC SKIN ERUPTION: Primary | ICD-10-CM

## 2018-06-15 DIAGNOSIS — T14.8XXA BRUISING: ICD-10-CM

## 2018-06-15 DIAGNOSIS — Z86.2 HISTORY OF TRANSIENT THROMBOCYTOPENIA: ICD-10-CM

## 2018-06-15 LAB
BASOPHILS # BLD AUTO: 0.02 10*3/MM3 (ref 0–0.2)
BASOPHILS NFR BLD AUTO: 0.3 % (ref 0–1)
DEPRECATED RDW RBC AUTO: 47 FL (ref 37–54)
EOSINOPHIL # BLD AUTO: 0.29 10*3/MM3 (ref 0–0.3)
EOSINOPHIL NFR BLD AUTO: 4.6 % (ref 0–3)
ERYTHROCYTE [DISTWIDTH] IN BLOOD BY AUTOMATED COUNT: 13.1 % (ref 11.3–14.5)
HCT VFR BLD AUTO: 43 % (ref 34.5–44)
HGB BLD-MCNC: 14.2 G/DL (ref 11.5–15.5)
IMM GRANULOCYTES # BLD: 0.01 10*3/MM3 (ref 0–0.03)
IMM GRANULOCYTES NFR BLD: 0.2 % (ref 0–0.6)
LYMPHOCYTES # BLD AUTO: 1.47 10*3/MM3 (ref 0.6–4.8)
LYMPHOCYTES NFR BLD AUTO: 23.6 % (ref 24–44)
MCH RBC QN AUTO: 32.2 PG (ref 27–31)
MCHC RBC AUTO-ENTMCNC: 33 G/DL (ref 32–36)
MCV RBC AUTO: 97.5 FL (ref 80–99)
MONOCYTES # BLD AUTO: 0.65 10*3/MM3 (ref 0–1)
MONOCYTES NFR BLD AUTO: 10.4 % (ref 0–12)
NEUTROPHILS # BLD AUTO: 3.8 10*3/MM3 (ref 1.5–8.3)
NEUTROPHILS NFR BLD AUTO: 60.9 % (ref 41–71)
PLATELET # BLD AUTO: 302 10*3/MM3 (ref 150–450)
PMV BLD AUTO: 9.9 FL (ref 6–12)
RBC # BLD AUTO: 4.41 10*6/MM3 (ref 3.89–5.14)
WBC NRBC COR # BLD: 6.24 10*3/MM3 (ref 3.5–10.8)

## 2018-06-15 PROCEDURE — 36415 COLL VENOUS BLD VENIPUNCTURE: CPT | Performed by: PHYSICIAN ASSISTANT

## 2018-06-15 PROCEDURE — 85025 COMPLETE CBC W/AUTO DIFF WBC: CPT | Performed by: PHYSICIAN ASSISTANT

## 2018-06-15 PROCEDURE — 99214 OFFICE O/P EST MOD 30 MIN: CPT | Performed by: PHYSICIAN ASSISTANT

## 2018-06-15 RX ORDER — HYDROXYZINE PAMOATE 25 MG/1
25 CAPSULE ORAL DAILY
Qty: 10 CAPSULE | Refills: 0 | Status: SHIPPED | OUTPATIENT
Start: 2018-06-15 | End: 2018-09-12

## 2018-06-15 RX ORDER — METHYLPREDNISOLONE 4 MG/1
TABLET ORAL
Qty: 21 TABLET | Refills: 0 | Status: SHIPPED | OUTPATIENT
Start: 2018-06-15 | End: 2018-06-18 | Stop reason: ALTCHOICE

## 2018-06-15 NOTE — PROGRESS NOTES
Chief Complaint   Patient presents with   • Blister     hip bones, blister/rash, also on chest.       Subjective       History of Present Illness     Bebe Rincon is a 38 y.o. female. She presents with 2 month history itching on lateral sides of breasts, now having similar itching/ rash on anterior hips. Patient states episode began 2 months ago with itching of left breast, later found lump of left breast and had diagnostic mammo of breasts with benign results/ unremarkable mammo. Had similar itching of right breast began a few days later. Now having similar symptoms at hips; began 4 days ago on anterior R hip with rash/ blisters and bruising, then on left hip with joycelyn/ blisters and bruising of left hip. States that bruising appeared with rash and she has not had recent trauma to these areas, no known cause of bruising. Today, rash has subsided on R hip but still has rash/ blisters on left hip. Says these blisters begin under the skin then come to a head after a few days. The rash is not painful, just intensely itchy. She denies scratching the rash. She is not sleeping well secondary to itching. She has been more fatigued then normal the last 3 weeks, but does report increase in social activities and family gatherings. She has tried triamcinolone cream with no improvement. She tried benadryl last night and was able to get enough relief from itching to sleep.       The following portions of the patient's history were reviewed and updated as appropriate: allergies, current medications, past medical history, past social history and problem list.    Allergies   Allergen Reactions   • Lamictal [Lamotrigine] Anaphylaxis   • Rocephin [Ceftriaxone] Hives   • Tetracyclines & Related Other (See Comments)     Low h&h     Social History   Substance Use Topics   • Smoking status: Current Every Day Smoker     Start date: 1998   • Smokeless tobacco: Never Used   • Alcohol use No         Current Outpatient Prescriptions:   •   acyclovir (ZOVIRAX) 5 % ointment, Apply 1 application topically As Needed., Disp: , Rfl:   •  amLODIPine (NORVASC) 5 MG tablet, Take 1 tablet by mouth Daily., Disp: 30 tablet, Rfl: 2  •  fluticasone (FLONASE) 50 MCG/ACT nasal spray, 2 sprays into each nostril Daily. (Patient taking differently: 2 sprays into each nostril Daily As Needed.), Disp: 1 bottle, Rfl: 0  •  hydrochlorothiazide (HYDRODIURIL) 50 MG tablet, Take 1 tablet by mouth Daily., Disp: 30 tablet, Rfl: 5  •  losartan (COZAAR) 50 MG tablet, Take 1.5 tablets by mouth Daily. (Patient taking differently: Take 50 mg by mouth 2 (Two) Times a Day.), Disp: 45 tablet, Rfl: 2  •  PROAIR  (90 Base) MCG/ACT inhaler, Inhale 2 puffs Every 6 (Six) Hours As Needed., Disp: , Rfl:   •  triamcinolone (KENALOG) 0.1 % ointment, Apply  topically 2 (Two) Times a Day., Disp: 30 g, Rfl: 2  •  valACYclovir (VALTREX) 1000 MG tablet, Take 1 tablet by mouth 3 (Three) Times a Day., Disp: 30 tablet, Rfl: 0  •  ZENZEDI 30 MG tablet, Take 1 tablet by mouth 2 (Two) Times a Day., Disp: , Rfl: 0  •  ziprasidone (GEODON) 40 MG capsule, Take 1 capsule by mouth 2 (Two) Times a Day., Disp: , Rfl: 5  •  hydrOXYzine (VISTARIL) 25 MG capsule, Take 1 capsule by mouth Daily., Disp: 10 capsule, Rfl: 0  •  MethylPREDNISolone (MEDROL, JEROME,) 4 MG tablet, Take as directed on package instructions., Disp: 21 tablet, Rfl: 0    Review of Systems   Constitutional: Negative for chills, fatigue and fever.   HENT: Negative for congestion, ear pain and sore throat.    Eyes: Negative for pain.   Respiratory: Negative for cough, shortness of breath and wheezing.    Cardiovascular: Negative for chest pain and palpitations.   Gastrointestinal: Negative for abdominal pain, diarrhea, nausea and vomiting.   Genitourinary: Negative for dysuria and hematuria.   Skin: Positive for rash and bruise.        +itching   Neurological: Negative for dizziness, weakness and light-headedness.   Hematological: Does not  bruise/bleed easily.       Objective   Vitals:    06/15/18 1027   BP: 162/94   Pulse: 96   Resp: 16   Temp: 97.2 °F (36.2 °C)   SpO2: 100%     Physical Exam   Constitutional: She appears well-developed and well-nourished.   HENT:   Head: Normocephalic and atraumatic.   Mouth/Throat: Oropharynx is clear and moist.   Eyes: Conjunctivae are normal. Pupils are equal, round, and reactive to light.   Cardiovascular: Normal rate and regular rhythm.    No murmur heard.  Pulmonary/Chest: Effort normal and breath sounds normal. She has no wheezes. She has no rales.   Abdominal: Soft. There is no tenderness.   Lymphadenopathy:     She has no cervical adenopathy.   Skin: Bruising and rash noted.   +papular rash noted on L anterior hip, approx 3cm x 3cm area without vesicles. Bruising noted around rash. No discharge noted. No warmth. No tenderness on palpation.   Additional small bruise noted on contralateral side at nearly same location, R anterior hip, small purple/yellow bruise with no rash.   No rash noted on chest/ breasts.    Psychiatric: She has a normal mood and affect. Her behavior is normal.         Assessment/Plan   Bebe was seen today for blister.    Diagnoses and all orders for this visit:    Rash and nonspecific skin eruption  -     CBC & Differential  -     MethylPREDNISolone (MEDROL, JEROME,) 4 MG tablet; Take as directed on package instructions.  -     hydrOXYzine (VISTARIL) 25 MG capsule; Take 1 capsule by mouth Daily.    Itching  -     CBC & Differential  -     MethylPREDNISolone (MEDROL, JEROME,) 4 MG tablet; Take as directed on package instructions.  -     hydrOXYzine (VISTARIL) 25 MG capsule; Take 1 capsule by mouth Daily.    Bruising  -     CBC & Differential    History of transient thrombocytopenia    No rash noted on chest/ breasts, only minimal rash on L anterior hip and a little bruising on R hip. No significant bruising noted elsewhere on body.  Advised to take vistaril at night for itching and  sleep.  Steroid dose [place] and discussed dosing with patient.  May d/c triamcinolone if no improvement.     Consider increasing HTN meds at next visit.          Return for Next scheduled follow up.

## 2018-06-16 RX ORDER — HYDROXYZINE HYDROCHLORIDE 25 MG/1
25 TABLET, FILM COATED ORAL 3 TIMES DAILY PRN
Qty: 50 TABLET | Refills: 1 | Status: SHIPPED | OUTPATIENT
Start: 2018-06-16 | End: 2018-12-26 | Stop reason: SDUPTHER

## 2018-06-18 ENCOUNTER — OFFICE VISIT (OUTPATIENT)
Dept: INTERNAL MEDICINE | Facility: CLINIC | Age: 38
End: 2018-06-18

## 2018-06-18 VITALS
RESPIRATION RATE: 18 BRPM | DIASTOLIC BLOOD PRESSURE: 86 MMHG | SYSTOLIC BLOOD PRESSURE: 138 MMHG | HEART RATE: 64 BPM | WEIGHT: 206 LBS | TEMPERATURE: 97.6 F | BODY MASS INDEX: 29.98 KG/M2

## 2018-06-18 DIAGNOSIS — E83.51 HYPOCALCEMIA: ICD-10-CM

## 2018-06-18 DIAGNOSIS — R21 RASH AND NONSPECIFIC SKIN ERUPTION: Primary | ICD-10-CM

## 2018-06-18 LAB
ALBUMIN SERPL-MCNC: 3.98 G/DL (ref 3.2–4.8)
ALBUMIN/GLOB SERPL: 1.3 G/DL (ref 1.5–2.5)
ALP SERPL-CCNC: 72 U/L (ref 25–100)
ALT SERPL W P-5'-P-CCNC: 16 U/L (ref 7–40)
ANION GAP SERPL CALCULATED.3IONS-SCNC: 6 MMOL/L (ref 3–11)
AST SERPL-CCNC: 16 U/L (ref 0–33)
BILIRUB SERPL-MCNC: 0.4 MG/DL (ref 0.3–1.2)
BUN BLD-MCNC: 11 MG/DL (ref 9–23)
BUN/CREAT SERPL: 13.9 (ref 7–25)
CALCIUM SPEC-SCNC: 8.3 MG/DL (ref 8.7–10.4)
CHLORIDE SERPL-SCNC: 105 MMOL/L (ref 99–109)
CO2 SERPL-SCNC: 26 MMOL/L (ref 20–31)
CREAT BLD-MCNC: 0.79 MG/DL (ref 0.6–1.3)
ERYTHROCYTE [SEDIMENTATION RATE] IN BLOOD: 8 MM/HR (ref 0–20)
GFR SERPL CREATININE-BSD FRML MDRD: 82 ML/MIN/1.73
GLOBULIN UR ELPH-MCNC: 3 GM/DL
GLUCOSE BLD-MCNC: 82 MG/DL (ref 70–100)
POTASSIUM BLD-SCNC: 4.3 MMOL/L (ref 3.5–5.5)
PROT SERPL-MCNC: 7 G/DL (ref 5.7–8.2)
SODIUM BLD-SCNC: 137 MMOL/L (ref 132–146)

## 2018-06-18 PROCEDURE — 36415 COLL VENOUS BLD VENIPUNCTURE: CPT | Performed by: NURSE PRACTITIONER

## 2018-06-18 PROCEDURE — 85652 RBC SED RATE AUTOMATED: CPT | Performed by: NURSE PRACTITIONER

## 2018-06-18 PROCEDURE — 99214 OFFICE O/P EST MOD 30 MIN: CPT | Performed by: NURSE PRACTITIONER

## 2018-06-18 PROCEDURE — 80053 COMPREHEN METABOLIC PANEL: CPT | Performed by: NURSE PRACTITIONER

## 2018-06-18 RX ORDER — PREDNISONE 20 MG/1
TABLET ORAL
Qty: 49 TABLET | Refills: 0 | Status: SHIPPED | OUTPATIENT
Start: 2018-06-18 | End: 2018-09-12

## 2018-06-18 NOTE — PROGRESS NOTES
Subjective:    Bebe Rincon is a 38 y.o. female.     Chief Complaint   Patient presents with   • Rash     breast, hip, thigh x since april 2018 and bruising    •             History of Present Illness   Patient complains of rash on breasts-originally appeared in April-occurs intermittently and itches intensely. Now rash has appeared on bilateral hips and right thigh 2 days ago-intensely itchy. States she has not tried Atarax due to interaction with Geodon. Triamcinolone cream, valtrex or steroid pack have had no effect or relief. Denies any changes in detergents or bath products.         Current Outpatient Prescriptions:   •  acyclovir (ZOVIRAX) 5 % ointment, Apply 1 application topically As Needed., Disp: , Rfl:   •  amLODIPine (NORVASC) 5 MG tablet, Take 1 tablet by mouth Daily., Disp: 30 tablet, Rfl: 2  •  fluticasone (FLONASE) 50 MCG/ACT nasal spray, 2 sprays into each nostril Daily. (Patient taking differently: 2 sprays into each nostril Daily As Needed.), Disp: 1 bottle, Rfl: 0  •  hydrochlorothiazide (HYDRODIURIL) 50 MG tablet, Take 1 tablet by mouth Daily., Disp: 30 tablet, Rfl: 5  •  hydrOXYzine (ATARAX) 25 MG tablet, Take 1 tablet by mouth 3 (Three) Times a Day As Needed for Itching. Will Cause Drowsiness., Disp: 50 tablet, Rfl: 1  •  losartan (COZAAR) 50 MG tablet, Take 1.5 tablets by mouth Daily. (Patient taking differently: Take 50 mg by mouth 2 (Two) Times a Day.), Disp: 45 tablet, Rfl: 2  •  PROAIR  (90 Base) MCG/ACT inhaler, Inhale 2 puffs Every 6 (Six) Hours As Needed., Disp: , Rfl:   •  triamcinolone (KENALOG) 0.1 % ointment, Apply  topically 2 (Two) Times a Day., Disp: 30 g, Rfl: 2  •  valACYclovir (VALTREX) 1000 MG tablet, Take 1 tablet by mouth 3 (Three) Times a Day., Disp: 30 tablet, Rfl: 0  •  ZENZEDI 30 MG tablet, Take 1 tablet by mouth 2 (Two) Times a Day., Disp: , Rfl: 0  •  ziprasidone (GEODON) 40 MG capsule, Take 1 capsule by mouth 2 (Two) Times a Day., Disp: , Rfl: 5  •   hydrOXYzine (VISTARIL) 25 MG capsule, Take 1 capsule by mouth Daily., Disp: 10 capsule, Rfl: 0  •  predniSONE (DELTASONE) 20 MG tablet, Take 2 BID x 1 week (28), 1 BID x 1 week, 1 daily x 1 week, Disp: 49 tablet, Rfl: 0     The following portions of the patient's history were reviewed and updated as appropriate: allergies, current medications, past family history, past medical history, past social history, past surgical history and problem list.    Review of Systems   Constitutional: Negative for chills, fatigue and fever.   HENT: Negative for congestion, dental problem, mouth sores, nosebleeds, postnasal drip, rhinorrhea, sinus pain, sinus pressure, sneezing and sore throat.    Eyes: Negative for pain, discharge, redness and itching.   Respiratory: Negative for cough, shortness of breath and wheezing.    Cardiovascular: Negative for chest pain, palpitations and leg swelling.   Gastrointestinal: Negative for abdominal distention, abdominal pain, blood in stool, diarrhea, nausea and vomiting.   Endocrine: Negative for cold intolerance, heat intolerance, polydipsia and polyuria.   Genitourinary: Negative for difficulty urinating, dysuria, frequency, hematuria and urgency.   Musculoskeletal: Negative for arthralgias, gait problem, joint swelling and myalgias.   Skin: Positive for rash. Negative for color change and wound.   Allergic/Immunologic: Negative.    Neurological: Negative for dizziness, syncope, weakness, light-headedness, numbness and headaches.   Hematological: Negative for adenopathy. Does not bruise/bleed easily.   Psychiatric/Behavioral:        Bipolar       Objective:    /86 (BP Location: Right arm, Patient Position: Sitting, Cuff Size: Adult)   Pulse 64   Temp 97.6 °F (36.4 °C) (Temporal Artery )   Resp 18   Wt 93.4 kg (206 lb)   LMP  (LMP Unknown) Comment: NO PAP SINCE HYSTERECTOMY  BMI 29.98 kg/m²     Physical Exam   Constitutional: She is oriented to person, place, and time. She appears  well-developed and well-nourished. She is cooperative. She is easily aroused.  Non-toxic appearance. She does not have a sickly appearance. She does not appear ill. No distress.   HENT:   Head: Normocephalic and atraumatic. Head is without abrasion. Hair is normal.   Right Ear: Hearing, tympanic membrane, external ear and ear canal normal. No foreign bodies. Tympanic membrane is not perforated and not erythematous.   Left Ear: Hearing, tympanic membrane, external ear and ear canal normal. No foreign bodies. Tympanic membrane is not perforated and not erythematous.   Nose: Nose normal. No mucosal edema, rhinorrhea or septal deviation. No epistaxis.  No foreign bodies.   Mouth/Throat: Oropharynx is clear and moist. No oral lesions. Normal dentition.   Eyes: Conjunctivae and lids are normal. Pupils are equal, round, and reactive to light. Right eye exhibits no discharge. Left eye exhibits no discharge. Right conjunctiva is not injected. Left conjunctiva is not injected. No scleral icterus.   Neck: Normal range of motion and full passive range of motion without pain. Neck supple. No edema and normal range of motion present. No thyroid mass and no thyromegaly present.   Cardiovascular: Normal rate, regular rhythm and normal heart sounds.  Exam reveals no gallop and no friction rub.    No murmur heard.  Pulmonary/Chest: Effort normal and breath sounds normal. No accessory muscle usage. She has no rhonchi. She has no rales. She exhibits no tenderness.   Abdominal: Soft. Bowel sounds are normal. She exhibits no distension. There is no hepatosplenomegaly or hepatomegaly. There is no tenderness. There is no CVA tenderness.   Musculoskeletal: Normal range of motion. She exhibits no edema, tenderness or deformity.   Lymphadenopathy:     She has no cervical adenopathy.   Neurological: She is alert, oriented to person, place, and time and easily aroused. She has normal reflexes. No cranial nerve deficit. Coordination normal.    Muscle strength 5/5 and equal throughout.   Skin: Skin is warm, dry and intact. Rash noted. No abrasion noted. She is not diaphoretic. No cyanosis or erythema. Nails show no clubbing.   Peeling sunburn on left upper chest. Erythematous, maculopapular rash on bilateral outer breasts and bilateral hips and right upper thigh area.   Psychiatric: She has a normal mood and affect. Her speech is normal and behavior is normal.   Nursing note and vitals reviewed.      Assessment/Plan:    Bebe was seen today for rash and weight gain.    Diagnoses and all orders for this visit:    Rash and nonspecific skin eruption  -     Comprehensive Metabolic Panel  -     Sedimentation Rate  -     predniSONE (DELTASONE) 20 MG tablet; Take 2 BID x 1 week, 1 BID x 1 week, 1 daily x 1 week        Return if symptoms worsen or fail to improve.

## 2018-06-19 ENCOUNTER — TELEPHONE (OUTPATIENT)
Dept: INTERNAL MEDICINE | Facility: CLINIC | Age: 38
End: 2018-06-19

## 2018-06-19 NOTE — TELEPHONE ENCOUNTER
6-19-18  FYI: I see where you had an Ionized calcium added to patients labs .  Patient will have to be called to come back for a redraw if you want this because the tube cannot be opened to air .

## 2018-08-02 ENCOUNTER — TELEPHONE (OUTPATIENT)
Dept: GENETICS | Facility: HOSPITAL | Age: 38
End: 2018-08-02

## 2018-09-12 ENCOUNTER — OFFICE VISIT (OUTPATIENT)
Dept: INTERNAL MEDICINE | Facility: CLINIC | Age: 38
End: 2018-09-12

## 2018-09-12 VITALS
RESPIRATION RATE: 18 BRPM | HEART RATE: 88 BPM | BODY MASS INDEX: 29.1 KG/M2 | DIASTOLIC BLOOD PRESSURE: 104 MMHG | SYSTOLIC BLOOD PRESSURE: 148 MMHG | TEMPERATURE: 98.3 F | WEIGHT: 201 LBS

## 2018-09-12 DIAGNOSIS — H53.9 VISUAL CHANGES: ICD-10-CM

## 2018-09-12 DIAGNOSIS — I10 ESSENTIAL HYPERTENSION: Primary | ICD-10-CM

## 2018-09-12 PROCEDURE — 99214 OFFICE O/P EST MOD 30 MIN: CPT | Performed by: INTERNAL MEDICINE

## 2018-09-12 RX ORDER — LOSARTAN POTASSIUM 100 MG/1
100 TABLET ORAL DAILY
Qty: 30 TABLET | Refills: 1 | Status: SHIPPED | OUTPATIENT
Start: 2018-09-12 | End: 2018-12-26 | Stop reason: SDUPTHER

## 2018-09-12 RX ORDER — PROPRANOLOL HYDROCHLORIDE 20 MG/1
1 TABLET ORAL 2 TIMES DAILY PRN
Refills: 1 | COMMUNITY
Start: 2018-09-04 | End: 2018-09-17

## 2018-09-12 NOTE — PROGRESS NOTES
Chief Complaint   Patient presents with   • INTERMITTENT BLURRY VISION       History of Present Illness    The patient presents for a follow-up related to hypertension. The patient reports that she has had blurred vision but she denies having headaches, chest pain, dyspnea, edema or syncope. She states that she is taking her medication as prescribed. She is not having medication side effects.    The patient complains of the gradual onset of visual blurring involving both eyes. The symptoms began one week ago and are persistent. The patient states that the symptoms are manifested by blurred vision. The patient denies fevers, chills or sweats. She denies a past history of migraine headaches. She denies having a headache currently.    Review of Systems    GENERAL/CONSTITUTIONAL- Denies Unexplained Weight Loss, Fatigue, Weakness or Malaise.    HEENT- Reports: Visual Disturbance. Denies: Eye Pain, Eye Drainage, Nasal Discharge, Sore Throat, Ear Pain, Ear Drainage, Decreased Vision, Sinus Pain, Nasal Congestion, Decreased Hearing, Diplopia or Tinnitus.    CARDIOVASCULAR- Denies Claudication.    GASTROINTESTINAL- Denies Abdominal Pain, Nausea, Vomiting, Diarrhea, Blood per Rectum, Constipation or Heartburn.    PULMONARY- Denies Wheezing, Sputum Production, Cough, Hemoptysis or Pleuritic Chest Pain.    NEUROLOGIC- Denies Seizures, Confusion, Memory Loss or Depression.    Medications      Current Outpatient Prescriptions:   •  acyclovir (ZOVIRAX) 5 % ointment, Apply 1 application topically As Needed., Disp: , Rfl:   •  fluticasone (FLONASE) 50 MCG/ACT nasal spray, 2 sprays into each nostril Daily. (Patient taking differently: 2 sprays into each nostril Daily As Needed.), Disp: 1 bottle, Rfl: 0  •  hydrochlorothiazide (HYDRODIURIL) 50 MG tablet, Take 1 tablet by mouth Daily., Disp: 30 tablet, Rfl: 5  •  hydrOXYzine (ATARAX) 25 MG tablet, Take 1 tablet by mouth 3 (Three) Times a Day As Needed for Itching. Will Cause  Drowsiness., Disp: 50 tablet, Rfl: 1  •  PROAIR  (90 Base) MCG/ACT inhaler, Inhale 2 puffs Every 6 (Six) Hours As Needed., Disp: , Rfl:   •  propranolol (INDERAL) 20 MG tablet, Take 1 tablet by mouth 2 (Two) Times a Day As Needed., Disp: , Rfl: 1  •  valACYclovir (VALTREX) 1000 MG tablet, Take 1 tablet by mouth 3 (Three) Times a Day. (Patient taking differently: Take 1,000 mg by mouth As Needed.), Disp: 30 tablet, Rfl: 0  •  ZENZEDI 30 MG tablet, Take 1 tablet by mouth 2 (Two) Times a Day., Disp: , Rfl: 0  •  ziprasidone (GEODON) 40 MG capsule, Take 1 capsule by mouth 2 (Two) Times a Day., Disp: , Rfl: 5  •  losartan (COZAAR) 50 MG tablet, Take 1.5 tablet by mouth Daily., Disp: 30 tablet, Rfl: 1     Allergies    Allergies   Allergen Reactions   • Lamictal [Lamotrigine] Anaphylaxis   • Rocephin [Ceftriaxone] Hives   • Tetracyclines & Related Other (See Comments)     Low h&h       Problem List    Patient Active Problem List   Diagnosis   • Essential hypertension       Medications, Allergies, Problems List and Past History were reviewed and updated.    Physical Examination    BP (!) 148/104 (BP Location: Right arm, Patient Position: Sitting, Cuff Size: Adult)   Pulse 88   Temp 98.3 °F (36.8 °C) (Temporal Artery )   Resp 18   Wt 91.2 kg (201 lb)   LMP  (LMP Unknown) Comment: NO PAP SINCE HYSTERECTOMY  Breastfeeding? No   BMI 29.10 kg/m²     HEENT: Head- Normocephalic Atraumatic. Facies- Within normal limits. Lids- Normal bilaterally. Conjunctiva- Clear bilaterally. Sclera- Anicteric bilaterally.    Neck: Thyroid- non enlarged, symmetric and has no nodules. No bruits are detected. ROM- Normal Range of Motion with no rigidity.    Lungs: Auscultation- Clear to auscultation bilaterally. There are no retractions, clubbing or cyanosis. The Expiratory to Inspiratory ratio is equal.    Cardiovascular: There are no carotid bruits. Heart- Normal Rate with Regular rhythm and no murmurs. There are no gallops. There  are no rubs. In the lower extremities there is no edema. The upper extremities do not have edema.    Cranial Nerves II-XII: Intact    Impression and Assessment    Essential Hypertension.    Visual Change.    Plan    Essential Hypertension Plan: Medication was added as noted below.    Visual Change Plan: The patient was referred to ophthalmology for a vision examination.    Bebe was seen today for intermittent blurry vision.    Diagnoses and all orders for this visit:    Essential hypertension  -     losartan (COZAAR) 100 MG tablet; Take 1 tablet by mouth Daily.  -     Ambulatory Referral to Ophthalmology    Visual changes  -     losartan (COZAAR) 100 MG tablet; Take 1 tablet by mouth Daily.  -     Ambulatory Referral to Ophthalmology        Return to Office    The patient was instructed to return for follow-up in 2 days.    The patient was instructed to return sooner if the condition changes, worsens, or doesn't resolve.

## 2018-09-17 ENCOUNTER — OFFICE VISIT (OUTPATIENT)
Dept: INTERNAL MEDICINE | Facility: CLINIC | Age: 38
End: 2018-09-17

## 2018-09-17 VITALS
TEMPERATURE: 98.1 F | BODY MASS INDEX: 29.68 KG/M2 | SYSTOLIC BLOOD PRESSURE: 164 MMHG | RESPIRATION RATE: 18 BRPM | DIASTOLIC BLOOD PRESSURE: 98 MMHG | HEART RATE: 84 BPM | WEIGHT: 205 LBS

## 2018-09-17 DIAGNOSIS — I10 ESSENTIAL HYPERTENSION: Primary | ICD-10-CM

## 2018-09-17 PROCEDURE — 99213 OFFICE O/P EST LOW 20 MIN: CPT | Performed by: INTERNAL MEDICINE

## 2018-09-17 RX ORDER — PROPRANOLOL HYDROCHLORIDE 20 MG/1
20 TABLET ORAL 2 TIMES DAILY
Qty: 60 TABLET | Refills: 2 | Status: SHIPPED | OUTPATIENT
Start: 2018-09-17 | End: 2018-12-26 | Stop reason: SDUPTHER

## 2018-09-23 NOTE — PROGRESS NOTES
Chief Complaint   Patient presents with   • Follow-up     2 DAY FOLLOW UP       History of Present Illness    The patient presents for a follow-up related to hypertension. The patient reports that she has had no headaches, chest pain, dyspnea, edema, syncope, blurred vision or palpitations. She states that she is taking her medication as prescribed. She is not having medication side effects. She checks her blood pressures at home. The home readings are normal.    Review of Systems    GENERAL/CONSTITUTIONAL- Denies Fever, Chills, Sweats, Weakness or Malaise.    CARDIOVASCULAR- Denies Claudication.    GASTROINTESTINAL- Denies Abdominal Pain, Nausea, Vomiting, Diarrhea, Blood per Rectum, Constipation or Heartburn.    PULMONARY- Denies Wheezing, Sputum Production, Cough, Hemoptysis or Pleuritic Chest Pain.    ENDOCRINE- Denies Cold Intolerance, Depression, Fatigue, Hair Loss, Heat Intolerance, Memory Loss, Polydypsia, Polyphagia, Polyuria, Sleep Disturbance, Weight Gain or Weight Loss.    Medications      Current Outpatient Prescriptions:   •  acyclovir (ZOVIRAX) 5 % ointment, Apply 1 application topically As Needed., Disp: , Rfl:   •  fluticasone (FLONASE) 50 MCG/ACT nasal spray, 2 sprays into each nostril Daily. (Patient taking differently: 2 sprays into each nostril Daily As Needed.), Disp: 1 bottle, Rfl: 0  •  hydrochlorothiazide (HYDRODIURIL) 50 MG tablet, Take 1 tablet by mouth Daily., Disp: 30 tablet, Rfl: 5  •  hydrOXYzine (ATARAX) 25 MG tablet, Take 1 tablet by mouth 3 (Three) Times a Day As Needed for Itching. Will Cause Drowsiness., Disp: 50 tablet, Rfl: 1  •  losartan (COZAAR) 100 MG tablet, Take 1 tablet by mouth Daily., Disp: 30 tablet, Rfl: 1  •  PROAIR  (90 Base) MCG/ACT inhaler, Inhale 2 puffs Every 6 (Six) Hours As Needed., Disp: , Rfl:   •  valACYclovir (VALTREX) 1000 MG tablet, Take 1 tablet by mouth 3 (Three) Times a Day. (Patient taking differently: Take 1,000 mg by mouth As Needed.), Disp:  30 tablet, Rfl: 0  •  ZENZEDI 30 MG tablet, Take 1 tablet by mouth 2 (Two) Times a Day., Disp: , Rfl: 0  •  ziprasidone (GEODON) 40 MG capsule, Take 1 capsule by mouth 2 (Two) Times a Day., Disp: , Rfl: 5     Allergies    Allergies   Allergen Reactions   • Lamictal [Lamotrigine] Anaphylaxis   • Amlodipine Irritability   • Rocephin [Ceftriaxone] Hives   • Tetracyclines & Related Other (See Comments)     Low h&h       Problem List    Patient Active Problem List   Diagnosis   • Essential hypertension       Medications, Allergies, Problems List and Past History were reviewed and updated.    Physical Examination    /98 (BP Location: Left arm, Patient Position: Sitting, Cuff Size: Adult)   Pulse 84   Temp 98.1 °F (36.7 °C) (Temporal Artery )   Resp 18   Wt 93 kg (205 lb)   LMP  (LMP Unknown) Comment: NO PAP SINCE HYSTERECTOMY  Breastfeeding? No   BMI 29.68 kg/m²     Neck: Thyroid- non enlarged, symmetric and has no nodules. No bruits are detected.    Lungs: Auscultation- Clear to auscultation bilaterally. There are no retractions, clubbing or cyanosis. The Expiratory to Inspiratory ratio is equal.    Cardiovascular: There are no carotid bruits. Heart- Normal Rate with Regular rhythm and no murmurs. There are no gallops. There are no rubs. In the lower extremities there is no edema. The upper extremities do not have edema.    Abdomen: Soft, benign, non-tender with no masses, hernias, organomegaly or scars.    Impression and Assessment    Essential Hypertension.    Plan    Essential Hypertension Plan: Medication was added as noted below.    Bebe was seen today for follow-up.    Diagnoses and all orders for this visit:    Essential hypertension    Other orders  -     propranolol (INDERAL) 20 MG tablet; Take 1 tablet by mouth 2 (Two) Times a Day.        Return to Office    The patient was instructed to return for follow-up in 10 days.    The patient was instructed to return sooner if the condition changes,  worsens, or doesn't resolve.

## 2018-11-06 ENCOUNTER — TELEPHONE (OUTPATIENT)
Dept: INTERNAL MEDICINE | Facility: CLINIC | Age: 38
End: 2018-11-06

## 2018-11-06 RX ORDER — VALACYCLOVIR HYDROCHLORIDE 1 G/1
1000 TABLET, FILM COATED ORAL 2 TIMES DAILY
Qty: 20 TABLET | Refills: 2 | Status: SHIPPED | OUTPATIENT
Start: 2018-11-06 | End: 2021-07-22

## 2018-11-06 RX ORDER — ACYCLOVIR 50 MG/G
1 OINTMENT TOPICAL EVERY 4 HOURS
Qty: 30 G | Refills: 2 | OUTPATIENT
Start: 2018-11-06 | End: 2020-11-25

## 2018-11-06 NOTE — TELEPHONE ENCOUNTER
----- Message from Isabel Mccray sent at 11/6/2018  8:11 AM EST -----  PATIENT CALLED NEEDING RX FOR valACYclovir (VALTREX) 1000 MG tablet AND acyclovir (ZOVIRAX) 5 % ointment. PATIENT STATES SHE IS HAVING A SEVER BREAKOUT.    PHARMACY: [facility]    THANK YOU.

## 2018-11-06 NOTE — TELEPHONE ENCOUNTER
LVM informing pt Rx's were sent to pharmacy. Provided office number should pt have any questions otherwise she is fine to not call back.

## 2018-12-14 ENCOUNTER — HOSPITAL ENCOUNTER (EMERGENCY)
Facility: HOSPITAL | Age: 38
Discharge: HOME | End: 2018-12-14
Attending: EMERGENCY MEDICINE | Admitting: EMERGENCY MEDICINE

## 2018-12-14 ENCOUNTER — APPOINTMENT (OUTPATIENT)
Dept: GENERAL RADIOLOGY | Facility: HOSPITAL | Age: 38
End: 2018-12-14

## 2018-12-14 ENCOUNTER — APPOINTMENT (OUTPATIENT)
Dept: CT IMAGING | Facility: HOSPITAL | Age: 38
End: 2018-12-14

## 2018-12-14 VITALS
WEIGHT: 190 LBS | BODY MASS INDEX: 27.2 KG/M2 | TEMPERATURE: 98.1 F | HEART RATE: 89 BPM | OXYGEN SATURATION: 98 % | RESPIRATION RATE: 18 BRPM | DIASTOLIC BLOOD PRESSURE: 86 MMHG | HEIGHT: 70 IN | SYSTOLIC BLOOD PRESSURE: 133 MMHG

## 2018-12-14 DIAGNOSIS — R07.9 CHEST PAIN, UNSPECIFIED TYPE: Primary | ICD-10-CM

## 2018-12-14 DIAGNOSIS — K20.90 ESOPHAGITIS: ICD-10-CM

## 2018-12-14 LAB
ALBUMIN SERPL-MCNC: 4.6 G/DL (ref 3.2–4.8)
ALBUMIN/GLOB SERPL: 1.6 G/DL (ref 1.5–2.5)
ALP SERPL-CCNC: 77 U/L (ref 25–100)
ALT SERPL W P-5'-P-CCNC: 19 U/L (ref 7–40)
ANION GAP SERPL CALCULATED.3IONS-SCNC: 11 MMOL/L (ref 3–11)
AST SERPL-CCNC: 19 U/L (ref 0–33)
BASOPHILS # BLD AUTO: 0.03 10*3/MM3 (ref 0–0.2)
BASOPHILS NFR BLD AUTO: 0.4 % (ref 0–1)
BILIRUB SERPL-MCNC: 0.4 MG/DL (ref 0.3–1.2)
BNP SERPL-MCNC: 16 PG/ML (ref 0–100)
BUN BLD-MCNC: 22 MG/DL (ref 9–23)
BUN/CREAT SERPL: 22.2 (ref 7–25)
CALCIUM SPEC-SCNC: 9.1 MG/DL (ref 8.7–10.4)
CHLORIDE SERPL-SCNC: 101 MMOL/L (ref 99–109)
CO2 SERPL-SCNC: 26 MMOL/L (ref 20–31)
CREAT BLD-MCNC: 0.99 MG/DL (ref 0.6–1.3)
DEPRECATED RDW RBC AUTO: 46.6 FL (ref 37–54)
EOSINOPHIL # BLD AUTO: 0.25 10*3/MM3 (ref 0–0.3)
EOSINOPHIL NFR BLD AUTO: 3.3 % (ref 0–3)
ERYTHROCYTE [DISTWIDTH] IN BLOOD BY AUTOMATED COUNT: 13.2 % (ref 11.3–14.5)
GFR SERPL CREATININE-BSD FRML MDRD: 63 ML/MIN/1.73
GLOBULIN UR ELPH-MCNC: 2.8 GM/DL
GLUCOSE BLD-MCNC: 95 MG/DL (ref 70–100)
HCT VFR BLD AUTO: 42.3 % (ref 34.5–44)
HGB BLD-MCNC: 14.2 G/DL (ref 11.5–15.5)
HOLD SPECIMEN: NORMAL
HOLD SPECIMEN: NORMAL
IMM GRANULOCYTES # BLD: 0.02 10*3/MM3 (ref 0–0.03)
IMM GRANULOCYTES NFR BLD: 0.3 % (ref 0–0.6)
LIPASE SERPL-CCNC: 39 U/L (ref 6–51)
LYMPHOCYTES # BLD AUTO: 1.98 10*3/MM3 (ref 0.6–4.8)
LYMPHOCYTES NFR BLD AUTO: 26.2 % (ref 24–44)
MCH RBC QN AUTO: 32.3 PG (ref 27–31)
MCHC RBC AUTO-ENTMCNC: 33.6 G/DL (ref 32–36)
MCV RBC AUTO: 96.4 FL (ref 80–99)
MONOCYTES # BLD AUTO: 0.76 10*3/MM3 (ref 0–1)
MONOCYTES NFR BLD AUTO: 10.1 % (ref 0–12)
NEUTROPHILS # BLD AUTO: 4.53 10*3/MM3 (ref 1.5–8.3)
NEUTROPHILS NFR BLD AUTO: 60 % (ref 41–71)
PLATELET # BLD AUTO: 316 10*3/MM3 (ref 150–450)
PMV BLD AUTO: 9.8 FL (ref 6–12)
POTASSIUM BLD-SCNC: 3.4 MMOL/L (ref 3.5–5.5)
PROT SERPL-MCNC: 7.4 G/DL (ref 5.7–8.2)
RBC # BLD AUTO: 4.39 10*6/MM3 (ref 3.89–5.14)
SODIUM BLD-SCNC: 138 MMOL/L (ref 132–146)
TROPONIN I SERPL-MCNC: 0 NG/ML (ref 0–0.07)
TROPONIN I SERPL-MCNC: <0.006 NG/ML
WBC NRBC COR # BLD: 7.55 10*3/MM3 (ref 3.5–10.8)
WHOLE BLOOD HOLD SPECIMEN: NORMAL
WHOLE BLOOD HOLD SPECIMEN: NORMAL

## 2018-12-14 PROCEDURE — 96375 TX/PRO/DX INJ NEW DRUG ADDON: CPT

## 2018-12-14 PROCEDURE — 25010000002 KETOROLAC TROMETHAMINE PER 15 MG: Performed by: EMERGENCY MEDICINE

## 2018-12-14 PROCEDURE — 85025 COMPLETE CBC W/AUTO DIFF WBC: CPT | Performed by: EMERGENCY MEDICINE

## 2018-12-14 PROCEDURE — 93005 ELECTROCARDIOGRAM TRACING: CPT | Performed by: EMERGENCY MEDICINE

## 2018-12-14 PROCEDURE — 80053 COMPREHEN METABOLIC PANEL: CPT | Performed by: EMERGENCY MEDICINE

## 2018-12-14 PROCEDURE — 96361 HYDRATE IV INFUSION ADD-ON: CPT

## 2018-12-14 PROCEDURE — 99284 EMERGENCY DEPT VISIT MOD MDM: CPT

## 2018-12-14 PROCEDURE — 71275 CT ANGIOGRAPHY CHEST: CPT

## 2018-12-14 PROCEDURE — 83880 ASSAY OF NATRIURETIC PEPTIDE: CPT | Performed by: EMERGENCY MEDICINE

## 2018-12-14 PROCEDURE — 71045 X-RAY EXAM CHEST 1 VIEW: CPT

## 2018-12-14 PROCEDURE — 96376 TX/PRO/DX INJ SAME DRUG ADON: CPT

## 2018-12-14 PROCEDURE — 0 IOPAMIDOL PER 1 ML: Performed by: EMERGENCY MEDICINE

## 2018-12-14 PROCEDURE — 96374 THER/PROPH/DIAG INJ IV PUSH: CPT

## 2018-12-14 PROCEDURE — 25010000002 ONDANSETRON PER 1 MG: Performed by: EMERGENCY MEDICINE

## 2018-12-14 PROCEDURE — 83690 ASSAY OF LIPASE: CPT | Performed by: EMERGENCY MEDICINE

## 2018-12-14 PROCEDURE — 84484 ASSAY OF TROPONIN QUANT: CPT | Performed by: EMERGENCY MEDICINE

## 2018-12-14 PROCEDURE — 25010000002 METOCLOPRAMIDE PER 10 MG: Performed by: EMERGENCY MEDICINE

## 2018-12-14 PROCEDURE — 25010000002 LORAZEPAM PER 2 MG: Performed by: EMERGENCY MEDICINE

## 2018-12-14 PROCEDURE — 84484 ASSAY OF TROPONIN QUANT: CPT

## 2018-12-14 RX ORDER — METOCLOPRAMIDE HYDROCHLORIDE 5 MG/ML
10 INJECTION INTRAMUSCULAR; INTRAVENOUS ONCE
Status: COMPLETED | OUTPATIENT
Start: 2018-12-14 | End: 2018-12-14

## 2018-12-14 RX ORDER — SUCRALFATE 1 G/1
1 TABLET ORAL ONCE
Status: COMPLETED | OUTPATIENT
Start: 2018-12-14 | End: 2018-12-14

## 2018-12-14 RX ORDER — ASPIRIN 81 MG/1
324 TABLET, CHEWABLE ORAL ONCE
Status: COMPLETED | OUTPATIENT
Start: 2018-12-14 | End: 2018-12-14

## 2018-12-14 RX ORDER — ALUMINA, MAGNESIA, AND SIMETHICONE 2400; 2400; 240 MG/30ML; MG/30ML; MG/30ML
15 SUSPENSION ORAL ONCE
Status: COMPLETED | OUTPATIENT
Start: 2018-12-14 | End: 2018-12-14

## 2018-12-14 RX ORDER — SODIUM CHLORIDE 0.9 % (FLUSH) 0.9 %
10 SYRINGE (ML) INJECTION AS NEEDED
Status: DISCONTINUED | OUTPATIENT
Start: 2018-12-14 | End: 2018-12-14 | Stop reason: HOSPADM

## 2018-12-14 RX ORDER — TOPIRAMATE 50 MG/1
50 TABLET, FILM COATED ORAL 2 TIMES DAILY
COMMUNITY
End: 2020-11-25

## 2018-12-14 RX ORDER — FAMOTIDINE 20 MG/1
20 TABLET, FILM COATED ORAL ONCE
Status: COMPLETED | OUTPATIENT
Start: 2018-12-14 | End: 2018-12-14

## 2018-12-14 RX ORDER — LORAZEPAM 2 MG/ML
1 INJECTION INTRAMUSCULAR ONCE
Status: COMPLETED | OUTPATIENT
Start: 2018-12-14 | End: 2018-12-14

## 2018-12-14 RX ORDER — QUETIAPINE FUMARATE 100 MG/1
100 TABLET, FILM COATED ORAL 2 TIMES DAILY
COMMUNITY
End: 2020-01-22

## 2018-12-14 RX ORDER — MORPHINE SULFATE 4 MG/ML
4 INJECTION, SOLUTION INTRAMUSCULAR; INTRAVENOUS ONCE
Status: COMPLETED | OUTPATIENT
Start: 2018-12-14 | End: 2018-12-14

## 2018-12-14 RX ORDER — FAMOTIDINE 20 MG/1
20 TABLET, FILM COATED ORAL 2 TIMES DAILY
Qty: 20 TABLET | Refills: 0 | Status: SHIPPED | OUTPATIENT
Start: 2018-12-14 | End: 2018-12-18 | Stop reason: DRUGHIGH

## 2018-12-14 RX ORDER — PROMETHAZINE HYDROCHLORIDE 25 MG/1
25 TABLET ORAL EVERY 6 HOURS PRN
Qty: 3 TABLET | Refills: 0 | OUTPATIENT
Start: 2018-12-14 | End: 2020-01-22

## 2018-12-14 RX ORDER — KETOROLAC TROMETHAMINE 30 MG/ML
30 INJECTION, SOLUTION INTRAMUSCULAR; INTRAVENOUS ONCE
Status: COMPLETED | OUTPATIENT
Start: 2018-12-14 | End: 2018-12-14

## 2018-12-14 RX ORDER — SUCRALFATE ORAL 1 G/10ML
1 SUSPENSION ORAL
Qty: 420 ML | Refills: 0 | OUTPATIENT
Start: 2018-12-14 | End: 2020-01-22

## 2018-12-14 RX ORDER — OMEPRAZOLE 40 MG/1
40 CAPSULE, DELAYED RELEASE ORAL DAILY
Qty: 25 CAPSULE | Refills: 0 | OUTPATIENT
Start: 2018-12-14 | End: 2020-01-22

## 2018-12-14 RX ORDER — ONDANSETRON 2 MG/ML
4 INJECTION INTRAMUSCULAR; INTRAVENOUS ONCE
Status: COMPLETED | OUTPATIENT
Start: 2018-12-14 | End: 2018-12-14

## 2018-12-14 RX ADMIN — LIDOCAINE HYDROCHLORIDE 15 ML: 20 SOLUTION ORAL; TOPICAL at 02:28

## 2018-12-14 RX ADMIN — IOPAMIDOL 75 ML: 755 INJECTION, SOLUTION INTRAVENOUS at 04:17

## 2018-12-14 RX ADMIN — ONDANSETRON 4 MG: 2 INJECTION INTRAMUSCULAR; INTRAVENOUS at 03:36

## 2018-12-14 RX ADMIN — LORAZEPAM 1 MG: 2 INJECTION INTRAMUSCULAR; INTRAVENOUS at 05:24

## 2018-12-14 RX ADMIN — METOCLOPRAMIDE 10 MG: 5 INJECTION, SOLUTION INTRAMUSCULAR; INTRAVENOUS at 06:25

## 2018-12-14 RX ADMIN — ALUMINUM HYDROXIDE, MAGNESIUM HYDROXIDE, AND DIMETHICONE 15 ML: 400; 400; 40 SUSPENSION ORAL at 02:29

## 2018-12-14 RX ADMIN — ASPIRIN 81 MG 324 MG: 81 TABLET ORAL at 01:57

## 2018-12-14 RX ADMIN — MORPHINE SULFATE 4 MG: 4 INJECTION, SOLUTION INTRAMUSCULAR; INTRAVENOUS at 03:36

## 2018-12-14 RX ADMIN — SODIUM CHLORIDE 1000 ML: 9 INJECTION, SOLUTION INTRAVENOUS at 03:36

## 2018-12-14 RX ADMIN — SODIUM CHLORIDE 1000 ML: 9 INJECTION, SOLUTION INTRAVENOUS at 05:30

## 2018-12-14 RX ADMIN — SUCRALFATE 1 G: 1 TABLET ORAL at 07:19

## 2018-12-14 RX ADMIN — LORAZEPAM 1 MG: 2 INJECTION INTRAMUSCULAR; INTRAVENOUS at 03:55

## 2018-12-14 RX ADMIN — FAMOTIDINE 20 MG: 20 TABLET ORAL at 02:28

## 2018-12-14 RX ADMIN — KETOROLAC TROMETHAMINE 30 MG: 30 INJECTION, SOLUTION INTRAMUSCULAR at 05:21

## 2018-12-14 NOTE — ED PROVIDER NOTES
"Subjective   Ms. Bebe Rincon is a 38 y.o. female who presents to the ED with c/o chest pain with onset 3 days ago. She reports that the pain feels like \"a vice\" and occurs after eating or drinking and is accompanied by a rash on her right face and neck. She describes that the pain and rash usually subside shortly after they starts but this time the pain has not, which prompted presentation to the ED. She also complains of light-headedness, pain on breathing deeply, nausea, vomiting, chills, and diaphoresis but she denies a fever and any difficulty swallowing. She also denies having similar issues in the past and any recent long distance travel. She has a history of HTN. There are no other acute complaints at this time.         History provided by:  Patient  Chest Pain   Pain quality: tightness    Pain radiates to:  Does not radiate  Pain severity:  Moderate  Onset quality:  Sudden  Duration:  3 days  Timing:  Intermittent  Progression:  Worsening  Chronicity:  New  Context: eating    Relieved by:  Nothing  Worsened by:  Deep breathing  Ineffective treatments:  None tried  Associated symptoms: diaphoresis, nausea and vomiting    Associated symptoms: no dysphagia    Risk factors: hypertension        Review of Systems   Constitutional: Positive for chills and diaphoresis.   HENT: Negative for trouble swallowing.    Cardiovascular: Positive for chest pain.        Occurs after eating or drinking   Gastrointestinal: Positive for nausea and vomiting.   Skin: Positive for rash (Right side of face and neck ).   Neurological: Positive for light-headedness.   All other systems reviewed and are negative.      Past Medical History:   Diagnosis Date   • ADHD (attention deficit hyperactivity disorder)    • Asthma    • Bipolar 1 disorder (CMS/HCC)    • Hemorrhage    • Herpes    • Hypertension    • Migraine    • Urinary tract infection        Allergies   Allergen Reactions   • Lamictal [Lamotrigine] Anaphylaxis   • Amlodipine " Irritability   • Rocephin [Ceftriaxone] Hives   • Tetracyclines & Related Other (See Comments)     Low h&h       Past Surgical History:   Procedure Laterality Date   • CHOLECYSTECTOMY     • HYSTERECTOMY     • OOPHORECTOMY  2015   • ROTATOR CUFF REPAIR     • ULNAR NERVE REPAIR         Family History   Problem Relation Age of Onset   • Arthritis Mother    • Hypertension Mother    • Thyroid disease Mother    • Thyroid cancer Mother    • Heart disease Father    • Hypertension Father    • Hyperlipidemia Father    • Colon cancer Maternal Grandmother    • Breast cancer Paternal Grandmother    • Diabetes Paternal Grandmother        Social History     Socioeconomic History   • Marital status:      Spouse name: Not on file   • Number of children: Not on file   • Years of education: Not on file   • Highest education level: Not on file   Tobacco Use   • Smoking status: Light Tobacco Smoker     Types: Cigarettes     Start date: 1998   • Smokeless tobacco: Never Used   • Tobacco comment: SOCIAL SMOKER   Substance and Sexual Activity   • Alcohol use: No   • Drug use: No   • Sexual activity: Defer         Objective   Physical Exam   Constitutional: She is oriented to person, place, and time. She appears well-developed and well-nourished.  Non-toxic appearance.   Nontoxic appearance.   HENT:   Head: Normocephalic and atraumatic.   Nose: Nose normal.   Mouth/Throat: Oropharynx is clear and moist.   Normal pharynx.   Eyes: Conjunctivae are normal.   Neck: Normal range of motion. Neck supple.   Cardiovascular: Normal rate, regular rhythm and normal heart sounds.   No murmur heard.  Pulmonary/Chest: Effort normal and breath sounds normal. No respiratory distress.   Abdominal: Soft. There is tenderness (Significant epigastric tenderness on palpitation. Consistent with suspected gastritis and esophagitis.).   Musculoskeletal: Normal range of motion.   Neurological: She is alert and oriented to person, place, and time.   Skin: Skin  is warm and dry. No rash noted.   There is no rash present at the time of examination.   Psychiatric: She has a normal mood and affect. Her behavior is normal.   Nursing note and vitals reviewed.      Procedures         ED Course  ED Course as of Dec 14 1248   Fri Dec 14, 2018   0511 Workup is negative for any significant pathology.  I suspect GI source for her pain.  Her mother is present in the room and also.  Unfortunately despite the pain medication artery administered the relief has not persisted.  She states that her pain is back and is just as bad as it was when she initially arrived.  I will attempt additional pain medication.  Once pain is adequately controlled we will have the patient follow-up with gastroenterology.  [CP]   1247 Pt's symptoms significantly improved with Reglan administration.  Both pt and mother are comfortable with discharge at this time.  [CP]      ED Course User Index  [CP] Ángel Serra,        Recent Results (from the past 24 hour(s))   Comprehensive Metabolic Panel    Collection Time: 12/14/18  1:52 AM   Result Value Ref Range    Glucose 95 70 - 100 mg/dL    BUN 22 9 - 23 mg/dL    Creatinine 0.99 0.60 - 1.30 mg/dL    Sodium 138 132 - 146 mmol/L    Potassium 3.4 (L) 3.5 - 5.5 mmol/L    Chloride 101 99 - 109 mmol/L    CO2 26.0 20.0 - 31.0 mmol/L    Calcium 9.1 8.7 - 10.4 mg/dL    Total Protein 7.4 5.7 - 8.2 g/dL    Albumin 4.60 3.20 - 4.80 g/dL    ALT (SGPT) 19 7 - 40 U/L    AST (SGOT) 19 0 - 33 U/L    Alkaline Phosphatase 77 25 - 100 U/L    Total Bilirubin 0.4 0.3 - 1.2 mg/dL    eGFR Non African Amer 63 >60 mL/min/1.73    Globulin 2.8 gm/dL    A/G Ratio 1.6 1.5 - 2.5 g/dL    BUN/Creatinine Ratio 22.2 7.0 - 25.0    Anion Gap 11.0 3.0 - 11.0 mmol/L   Lipase    Collection Time: 12/14/18  1:52 AM   Result Value Ref Range    Lipase 39 6 - 51 U/L   BNP    Collection Time: 12/14/18  1:52 AM   Result Value Ref Range    BNP 16.0 0.0 - 100.0 pg/mL   Light Blue Top    Collection Time: 12/14/18   1:52 AM   Result Value Ref Range    Extra Tube hold for add-on    Green Top (Gel)    Collection Time: 12/14/18  1:52 AM   Result Value Ref Range    Extra Tube Hold for add-ons.    Lavender Top    Collection Time: 12/14/18  1:52 AM   Result Value Ref Range    Extra Tube hold for add-on    Gold Top - SST    Collection Time: 12/14/18  1:52 AM   Result Value Ref Range    Extra Tube Hold for add-ons.    CBC Auto Differential    Collection Time: 12/14/18  1:52 AM   Result Value Ref Range    WBC 7.55 3.50 - 10.80 10*3/mm3    RBC 4.39 3.89 - 5.14 10*6/mm3    Hemoglobin 14.2 11.5 - 15.5 g/dL    Hematocrit 42.3 34.5 - 44.0 %    MCV 96.4 80.0 - 99.0 fL    MCH 32.3 (H) 27.0 - 31.0 pg    MCHC 33.6 32.0 - 36.0 g/dL    RDW 13.2 11.3 - 14.5 %    RDW-SD 46.6 37.0 - 54.0 fl    MPV 9.8 6.0 - 12.0 fL    Platelets 316 150 - 450 10*3/mm3    Neutrophil % 60.0 41.0 - 71.0 %    Lymphocyte % 26.2 24.0 - 44.0 %    Monocyte % 10.1 0.0 - 12.0 %    Eosinophil % 3.3 (H) 0.0 - 3.0 %    Basophil % 0.4 0.0 - 1.0 %    Immature Grans % 0.3 0.0 - 0.6 %    Neutrophils, Absolute 4.53 1.50 - 8.30 10*3/mm3    Lymphocytes, Absolute 1.98 0.60 - 4.80 10*3/mm3    Monocytes, Absolute 0.76 0.00 - 1.00 10*3/mm3    Eosinophils, Absolute 0.25 0.00 - 0.30 10*3/mm3    Basophils, Absolute 0.03 0.00 - 0.20 10*3/mm3    Immature Grans, Absolute 0.02 0.00 - 0.03 10*3/mm3   Troponin    Collection Time: 12/14/18  1:52 AM   Result Value Ref Range    Troponin I <0.006 <=0.039 ng/mL   POC Troponin, Rapid    Collection Time: 12/14/18  3:01 AM   Result Value Ref Range    Troponin I 0.00 0.00 - 0.07 ng/mL     Note: In addition to lab results from this visit, the labs listed above may include labs taken at another facility or during a different encounter within the last 24 hours. Please correlate lab times with ED admission and discharge times for further clarification of the services performed during this visit.    CT Angiogram Chest With Contrast   Final Result   No pulmonary  emboli.       THIS DOCUMENT HAS BEEN ELECTRONICALLY SIGNED BY ANNEL LOERA MD      XR Chest 1 View   Final Result   Clear lungs.       THIS DOCUMENT HAS BEEN ELECTRONICALLY SIGNED BY GEREMIAS CAMPA MD        Vitals:    12/14/18 0630 12/14/18 0700 12/14/18 0701 12/14/18 0822   BP: 133/96 124/77  133/86   BP Location:    Right arm   Patient Position:    Lying   Pulse: 89  85 89   Resp:    18   Temp:       TempSrc:       SpO2: 96% 95% 100% 98%   Weight:       Height:         Medications   aspirin chewable tablet 324 mg (324 mg Oral Given 12/14/18 0157)   aluminum-magnesium hydroxide-simethicone (MAALOX MAX) 400-400-40 MG/5ML suspension 15 mL (15 mL Oral Given 12/14/18 0229)   lidocaine viscous (XYLOCAINE) 2 % mouth solution 15 mL (15 mL Mouth/Throat Given 12/14/18 0228)   famotidine (PEPCID) tablet 20 mg (20 mg Oral Given 12/14/18 0228)   sodium chloride 0.9 % bolus 1,000 mL (0 mL Intravenous Stopped 12/14/18 0530)   ondansetron (ZOFRAN) injection 4 mg (4 mg Intravenous Given 12/14/18 0336)   LORazepam (ATIVAN) injection 1 mg (1 mg Intravenous Given 12/14/18 0355)   Morphine sulfate (PF) injection 4 mg (4 mg Intravenous Given 12/14/18 0336)   iopamidol (ISOVUE-370) 76 % injection 100 mL (75 mL Intravenous Given 12/14/18 0417)   ketorolac (TORADOL) injection 30 mg (30 mg Intravenous Given 12/14/18 0521)   LORazepam (ATIVAN) injection 1 mg (1 mg Intravenous Given 12/14/18 0524)   sodium chloride 0.9 % bolus 1,000 mL (0 mL Intravenous Stopped 12/14/18 0719)   metoclopramide (REGLAN) injection 10 mg (10 mg Intravenous Given 12/14/18 0625)   sucralfate (CARAFATE) tablet 1 g (1 g Oral Given 12/14/18 0719)     ECG/EMG Results (last 24 hours)     Procedure Component Value Units Date/Time    ECG 12 Lead [[id number]] Collected:  12/14/18 0132     Updated:  12/14/18 0132                      [facility]    Final diagnoses:   Chest pain, unspecified type   Esophagitis       Documentation assistance provided by swetha Boothe.   Information recorded by the scribe was done at my direction and has been verified and validated by me.     Vonnie Boothe  12/14/18 0232       Avis Gregorio  12/14/18 0238       Ángel Serra DO  12/14/18 1245

## 2018-12-14 NOTE — DISCHARGE INSTRUCTIONS
Please call ASAP to arrange outpatient follow up with one of the following gastroenterologists:    Jorge James MD or Naif Lynch MD  [address]  [place] [address] [phone]    Or    If unable to make a timely appointment with Dr. James or Dr. Lynch, please follow up with one of these gastroenterologists:    Shantanu Pardo MD or Cecil Arroyo MD  [id number]  [place] [address] [phone]    If follow up with these specialists cannot be arranged soon, please also follow up with a primary care provider, next available.

## 2018-12-18 ENCOUNTER — OFFICE VISIT (OUTPATIENT)
Dept: INTERNAL MEDICINE | Facility: CLINIC | Age: 38
End: 2018-12-18

## 2018-12-18 ENCOUNTER — HOSPITAL ENCOUNTER (OUTPATIENT)
Dept: GENERAL RADIOLOGY | Facility: HOSPITAL | Age: 38
Discharge: HOME | End: 2018-12-18
Admitting: NURSE PRACTITIONER

## 2018-12-18 VITALS
WEIGHT: 200.8 LBS | TEMPERATURE: 97.8 F | RESPIRATION RATE: 18 BRPM | HEART RATE: 87 BPM | BODY MASS INDEX: 28.75 KG/M2 | DIASTOLIC BLOOD PRESSURE: 90 MMHG | HEIGHT: 70 IN | SYSTOLIC BLOOD PRESSURE: 160 MMHG

## 2018-12-18 DIAGNOSIS — R10.13 EPIGASTRIC PRESSURE: Primary | ICD-10-CM

## 2018-12-18 DIAGNOSIS — R21 RASH: ICD-10-CM

## 2018-12-18 DIAGNOSIS — M79.644 PAIN OF FINGER OF RIGHT HAND: ICD-10-CM

## 2018-12-18 LAB — ERYTHROCYTE [SEDIMENTATION RATE] IN BLOOD: 17 MM/HR (ref 0–20)

## 2018-12-18 PROCEDURE — 83520 IMMUNOASSAY QUANT NOS NONAB: CPT | Performed by: NURSE PRACTITIONER

## 2018-12-18 PROCEDURE — 86038 ANTINUCLEAR ANTIBODIES: CPT | Performed by: NURSE PRACTITIONER

## 2018-12-18 PROCEDURE — 86430 RHEUMATOID FACTOR TEST QUAL: CPT | Performed by: NURSE PRACTITIONER

## 2018-12-18 PROCEDURE — 36415 COLL VENOUS BLD VENIPUNCTURE: CPT | Performed by: NURSE PRACTITIONER

## 2018-12-18 PROCEDURE — 86256 FLUORESCENT ANTIBODY TITER: CPT | Performed by: NURSE PRACTITIONER

## 2018-12-18 PROCEDURE — 73140 X-RAY EXAM OF FINGER(S): CPT

## 2018-12-18 PROCEDURE — 99214 OFFICE O/P EST MOD 30 MIN: CPT | Performed by: NURSE PRACTITIONER

## 2018-12-18 PROCEDURE — 86235 NUCLEAR ANTIGEN ANTIBODY: CPT | Performed by: NURSE PRACTITIONER

## 2018-12-18 PROCEDURE — 85652 RBC SED RATE AUTOMATED: CPT | Performed by: NURSE PRACTITIONER

## 2018-12-18 RX ORDER — FAMOTIDINE 40 MG/1
40 TABLET, FILM COATED ORAL DAILY
Qty: 30 TABLET | Refills: 1 | Status: SHIPPED | OUTPATIENT
Start: 2018-12-18 | End: 2018-12-18 | Stop reason: SDUPTHER

## 2018-12-18 RX ORDER — PROMETHAZINE HYDROCHLORIDE 25 MG/1
25 TABLET ORAL EVERY 6 HOURS PRN
Qty: 21 TABLET | Refills: 0 | OUTPATIENT
Start: 2018-12-18 | End: 2020-01-22

## 2018-12-18 RX ORDER — FAMOTIDINE 40 MG/1
40 TABLET, FILM COATED ORAL DAILY
Qty: 90 TABLET | Refills: 1 | OUTPATIENT
Start: 2018-12-18 | End: 2020-01-22

## 2018-12-18 NOTE — PROGRESS NOTES
"Subjective:    Bebe Rincon is a 38 y.o. female.     Chief Complaint   Patient presents with   • Follow-up     ER F/U referred to Gastro pt cannot eat or drink        History of Present Illness   Patient here for emergency room follow up. She was seen at [facility] emergency room on 12/14/2018 for complaint of sternum/epigastric pain that \"feels like a vice\" . This occurs after eating and drinking and is accompanied with an intermittent reddened rash on dorsal hands and upper chest and neck. She was referred to gastroenterology. She was prescribed Carafate and Pepcid without relief. She states she cannot eat or drink anything without this vice like feeling and feeling like her esophagus is on fire. She is concerned with Pedro Luis's Naun Syndrome and she feels dehydrated. She can eat popsicles, but this hurts throat also. She complains of vomiting and has used all phenergan that was prescribed. Patient states she cannot wait until 1/29/2018 gastroenterology appointment.     Patient complains of right ring finger pain and swelling since 9/17/2018. She went through glass and received sutures on 4 fingers of right hand. Other fingers have no issues. She has a reddened joint, joint swelling and finger pain since this injury.    Current Outpatient Medications:   •  acyclovir (ZOVIRAX) 5 % ointment, Apply 1 application topically to the appropriate area as directed Every 4 (Four) Hours., Disp: 30 g, Rfl: 2  •  famotidine (PEPCID) 40 MG tablet, Take 1 tablet by mouth Daily., Disp: 30 tablet, Rfl: 1  •  fluticasone (FLONASE) 50 MCG/ACT nasal spray, 2 sprays into each nostril Daily. (Patient taking differently: 2 sprays into each nostril Daily As Needed.), Disp: 1 bottle, Rfl: 0  •  hydrochlorothiazide (HYDRODIURIL) 50 MG tablet, Take 1 tablet by mouth Daily., Disp: 30 tablet, Rfl: 5  •  hydrOXYzine (ATARAX) 25 MG tablet, Take 1 tablet by mouth 3 (Three) Times a Day As Needed for Itching. Will Cause Drowsiness., Disp: " 50 tablet, Rfl: 1  •  losartan (COZAAR) 100 MG tablet, Take 1 tablet by mouth Daily., Disp: 30 tablet, Rfl: 1  •  omeprazole (priLOSEC) 40 MG capsule, Take 1 capsule by mouth Daily., Disp: 25 capsule, Rfl: 0  •  PROAIR  (90 Base) MCG/ACT inhaler, Inhale 2 puffs Every 6 (Six) Hours As Needed., Disp: , Rfl:   •  promethazine (PHENERGAN) 25 MG tablet, Take 1 tablet by mouth Every 6 (Six) Hours As Needed for Nausea or Vomiting., Disp: 3 tablet, Rfl: 0  •  promethazine (PHENERGAN) 25 MG tablet, Take 1 tablet by mouth Every 6 (Six) Hours As Needed for Nausea or Vomiting., Disp: 21 tablet, Rfl: 0  •  propranolol (INDERAL) 20 MG tablet, Take 1 tablet by mouth 2 (Two) Times a Day., Disp: 60 tablet, Rfl: 2  •  QUEtiapine (SEROquel) 100 MG tablet, Take 100 mg by mouth 2 (Two) Times a Day., Disp: , Rfl:   •  sucralfate (CARAFATE) 1 GM/10ML suspension, Take 10 mL by mouth 4 (Four) Times a Day With Meals & at Bedtime., Disp: 420 mL, Rfl: 0  •  topiramate (TOPAMAX) 50 MG tablet, Take 50 mg by mouth 2 (Two) Times a Day., Disp: , Rfl:   •  valACYclovir (VALTREX) 1000 MG tablet, Take 1 tablet by mouth 2 (Two) Times a Day., Disp: 20 tablet, Rfl: 2  •  ZENZEDI 30 MG tablet, Take 1 tablet by mouth 2 (Two) Times a Day., Disp: , Rfl: 0  •  ziprasidone (GEODON) 40 MG capsule, Take 1 capsule by mouth 2 (Two) Times a Day., Disp: , Rfl: 5     The following portions of the patient's history were reviewed and updated as appropriate: allergies, current medications, past family history, past medical history, past social history, past surgical history and problem list.    Review of Systems   Constitutional: Negative for chills, fatigue and fever.   HENT: Negative for congestion, ear pain, postnasal drip, rhinorrhea, sinus pressure, sneezing and sore throat.    Eyes: Negative for pain, discharge, redness and itching.   Respiratory: Negative for cough, chest tightness, shortness of breath and wheezing.    Cardiovascular: Negative for chest  "pain.   Gastrointestinal: Positive for abdominal pain and vomiting. Negative for anal bleeding, blood in stool, diarrhea, nausea and rectal pain.        Epigastric/sternum pain that feels like a vice post eating/drinking   Musculoskeletal: Negative for arthralgias and myalgias.        Right fourth finger pain   Skin: Positive for rash.   Neurological: Negative for headaches.   Hematological: Negative for adenopathy.       Objective:    /90 (BP Location: Right arm, Patient Position: Sitting, Cuff Size: Adult)   Pulse 87   Temp 97.8 °F (36.6 °C)   Resp 18   Ht 177.8 cm (70\")   Wt 91.1 kg (200 lb 12.8 oz)   LMP  (LMP Unknown) Comment: NO PAP SINCE HYSTERECTOMY  BMI 28.81 kg/m²     Physical Exam   Constitutional: She is oriented to person, place, and time. She appears well-developed and well-nourished. She is cooperative. She is easily aroused.  Non-toxic appearance. She does not have a sickly appearance. She does not appear ill. No distress.   HENT:   Head: Normocephalic and atraumatic. Head is without abrasion. Hair is normal.   Right Ear: Hearing, tympanic membrane, external ear and ear canal normal. No foreign bodies. Tympanic membrane is not perforated and not erythematous.   Left Ear: Hearing, tympanic membrane, external ear and ear canal normal. No foreign bodies. Tympanic membrane is not perforated and not erythematous.   Nose: Nose normal. No mucosal edema, rhinorrhea or septal deviation. No epistaxis.  No foreign bodies.   Mouth/Throat: Oropharynx is clear and moist and mucous membranes are normal. No oral lesions. Normal dentition.   Eyes: Conjunctivae and lids are normal. Pupils are equal, round, and reactive to light. Right eye exhibits no discharge. Left eye exhibits no discharge. Right conjunctiva is not injected. Left conjunctiva is not injected. No scleral icterus.   Neck: Normal range of motion and full passive range of motion without pain. Neck supple. No edema and normal range of motion " present. No thyroid mass and no thyromegaly present.   Cardiovascular: Normal rate, regular rhythm and normal heart sounds. Exam reveals no gallop and no friction rub.   No murmur heard.  Pulmonary/Chest: Effort normal and breath sounds normal. No accessory muscle usage. She has no rhonchi. She has no rales. She exhibits no tenderness.   Abdominal: Soft. Bowel sounds are normal. She exhibits no distension. There is no hepatosplenomegaly, splenomegaly or hepatomegaly. There is no tenderness. There is no rigidity, no rebound, no guarding, no CVA tenderness, no tenderness at McBurney's point and negative Soto's sign. No hernia.   Musculoskeletal: Normal range of motion. She exhibits no edema, tenderness or deformity.   Fourth finger with erythematous, swollen PIP joint and decreased range of motion   Lymphadenopathy:     She has no cervical adenopathy.   Neurological: She is alert, oriented to person, place, and time and easily aroused. No cranial nerve deficit. Coordination normal.   Skin: Skin is warm, dry and intact. Rash noted. No abrasion noted. She is not diaphoretic. No cyanosis or erythema. Nails show no clubbing.   Erythematous, macular rash on dorsal hands, upper chest and neck   Psychiatric: She has a normal mood and affect. Her speech is normal and behavior is normal.   Nursing note and vitals reviewed.      Assessment/Plan:    Bebe was seen today for follow-up.    Diagnoses and all orders for this visit:    Epigastric pressure  -     FL esophagram complete; Future-tomorrow morning  -     famotidine (PEPCID) 40 MG tablet; Take 1 tablet by mouth Daily.  -     promethazine (PHENERGAN) 25 MG tablet; Take 1 tablet by mouth Every 6 (Six) Hours As Needed for Nausea or Vomiting  Telephone call to gastroenterology to rearrange appointment from 1/29 to 1/21/2018.  Rash  -     FARIDEH  -     Sedimentation Rate  -     Rheumatoid Factor  -     Anti-scleroderma Antibody  -     ANCA Panel    Pain of finger of right  hand  -     XR finger 2+ vw right; Future            Return if symptoms worsen or fail to improve.

## 2018-12-19 ENCOUNTER — HOSPITAL ENCOUNTER (OUTPATIENT)
Dept: GENERAL RADIOLOGY | Facility: HOSPITAL | Age: 38
Discharge: HOME | End: 2018-12-19
Admitting: NURSE PRACTITIONER

## 2018-12-19 DIAGNOSIS — R10.13 EPIGASTRIC PRESSURE: ICD-10-CM

## 2018-12-19 LAB
ANA SER QL: NEGATIVE
ENA SCL70 AB SER-ACNC: <0.2 AI (ref 0–0.9)
RHEUMATOID FACT SERPL-ACNC: NEGATIVE [IU]/ML

## 2018-12-19 PROCEDURE — 74220 X-RAY XM ESOPHAGUS 1CNTRST: CPT

## 2018-12-19 PROCEDURE — A9270 NON-COVERED ITEM OR SERVICE: HCPCS | Performed by: NURSE PRACTITIONER

## 2018-12-19 PROCEDURE — 63710000001 BARIUM SULFATE 96 % RECONSTITUTED SUSPENSION: Performed by: NURSE PRACTITIONER

## 2018-12-19 RX ADMIN — BARIUM SULFATE 183 ML: 960 POWDER, FOR SUSPENSION ORAL at 09:22

## 2018-12-20 ENCOUNTER — TELEPHONE (OUTPATIENT)
Dept: INTERNAL MEDICINE | Facility: CLINIC | Age: 38
End: 2018-12-20

## 2018-12-20 DIAGNOSIS — M79.644 PAIN OF FINGER OF RIGHT HAND: Primary | ICD-10-CM

## 2018-12-20 DIAGNOSIS — R11.2 NAUSEA AND VOMITING, INTRACTABILITY OF VOMITING NOT SPECIFIED, UNSPECIFIED VOMITING TYPE: Primary | ICD-10-CM

## 2018-12-20 LAB
C-ANCA TITR SER IF: NORMAL TITER
MYELOPEROXIDASE AB SER-ACNC: <9 U/ML (ref 0–9)
P-ANCA ATYPICAL TITR SER IF: NORMAL TITER
P-ANCA TITR SER IF: NORMAL TITER
PROTEINASE3 AB SER IA-ACNC: <3.5 U/ML (ref 0–3.5)

## 2018-12-20 RX ORDER — PROMETHAZINE HYDROCHLORIDE 25 MG/1
25 SUPPOSITORY RECTAL EVERY 6 HOURS PRN
Qty: 9 SUPPOSITORY | Refills: 0 | OUTPATIENT
Start: 2018-12-20 | End: 2020-01-22

## 2018-12-20 NOTE — TELEPHONE ENCOUNTER
----- Message from JOVANNY Quiroga sent at 12/20/2018 10:35 AM EST -----  Please inform patient all labs are normal.

## 2018-12-20 NOTE — TELEPHONE ENCOUNTER
----- Message from Yeimi Shaffer sent at 12/20/2018  2:08 PM EST -----  Pt [phone]  PT WOULD LIKE TO SEE IF WE CAN CALL IN A COUPLE OF PHENERGAN SUPPOSITORIES SO SHE CAN EAT   [employer] [address]

## 2018-12-20 NOTE — TELEPHONE ENCOUNTER
----- Message from JOVANNY Quiroga sent at 12/20/2018 10:33 AM EST -----  Inform patient finger x ray normal. I will refer to hand specialist. She also has other results.

## 2018-12-20 NOTE — TELEPHONE ENCOUNTER
----- Message from JOVANNY Quiroga sent at 12/20/2018 10:34 AM EST -----  Please inform patient swallow study-1. Mild esophageal dysmotility  2. Moderate gastroesophageal reflux to the level of the cervical  esophagus  3. Small sized sliding-type hiatal hernia  Continue current medication and gastro appointment

## 2018-12-25 ENCOUNTER — APPOINTMENT (OUTPATIENT)
Dept: GENERAL RADIOLOGY | Facility: HOSPITAL | Age: 38
End: 2018-12-25

## 2018-12-25 ENCOUNTER — HOSPITAL ENCOUNTER (EMERGENCY)
Facility: HOSPITAL | Age: 38
Discharge: HOME | End: 2018-12-25
Attending: EMERGENCY MEDICINE | Admitting: EMERGENCY MEDICINE

## 2018-12-25 VITALS
HEART RATE: 101 BPM | WEIGHT: 186 LBS | TEMPERATURE: 98.2 F | RESPIRATION RATE: 16 BRPM | OXYGEN SATURATION: 100 % | SYSTOLIC BLOOD PRESSURE: 177 MMHG | BODY MASS INDEX: 26.63 KG/M2 | HEIGHT: 70 IN | DIASTOLIC BLOOD PRESSURE: 109 MMHG

## 2018-12-25 DIAGNOSIS — K22.4 ESOPHAGEAL DYSMOTILITY: ICD-10-CM

## 2018-12-25 DIAGNOSIS — L29.9 PRURITIC DERMATITIS: ICD-10-CM

## 2018-12-25 DIAGNOSIS — R07.9 CHEST PAIN, UNSPECIFIED TYPE: Primary | ICD-10-CM

## 2018-12-25 DIAGNOSIS — K21.9 GASTROESOPHAGEAL REFLUX DISEASE, ESOPHAGITIS PRESENCE NOT SPECIFIED: ICD-10-CM

## 2018-12-25 LAB
ALBUMIN SERPL-MCNC: 4.4 G/DL (ref 3.2–4.8)
ALBUMIN/GLOB SERPL: 1.6 G/DL (ref 1.5–2.5)
ALP SERPL-CCNC: 68 U/L (ref 25–100)
ALT SERPL W P-5'-P-CCNC: 21 U/L (ref 7–40)
ANION GAP SERPL CALCULATED.3IONS-SCNC: 8 MMOL/L (ref 3–11)
AST SERPL-CCNC: 21 U/L (ref 0–33)
BACTERIA UR QL AUTO: ABNORMAL /HPF
BASOPHILS # BLD AUTO: 0.02 10*3/MM3 (ref 0–0.2)
BASOPHILS NFR BLD AUTO: 0.3 % (ref 0–1)
BILIRUB SERPL-MCNC: 0.4 MG/DL (ref 0.3–1.2)
BILIRUB UR QL STRIP: NEGATIVE
BNP SERPL-MCNC: 62 PG/ML (ref 0–100)
BUN BLD-MCNC: 11 MG/DL (ref 9–23)
BUN/CREAT SERPL: 9.5 (ref 7–25)
CALCIUM SPEC-SCNC: 9.6 MG/DL (ref 8.7–10.4)
CHLORIDE SERPL-SCNC: 103 MMOL/L (ref 99–109)
CLARITY UR: CLEAR
CO2 SERPL-SCNC: 26 MMOL/L (ref 20–31)
COLOR UR: YELLOW
CREAT BLD-MCNC: 1.16 MG/DL (ref 0.6–1.3)
D DIMER PPP FEU-MCNC: 0.32 MG/L (FEU) (ref 0–0.5)
DEPRECATED RDW RBC AUTO: 46.2 FL (ref 37–54)
EOSINOPHIL # BLD AUTO: 0.19 10*3/MM3 (ref 0–0.3)
EOSINOPHIL NFR BLD AUTO: 2.6 % (ref 0–3)
ERYTHROCYTE [DISTWIDTH] IN BLOOD BY AUTOMATED COUNT: 13.1 % (ref 11.3–14.5)
GFR SERPL CREATININE-BSD FRML MDRD: 53 ML/MIN/1.73
GLOBULIN UR ELPH-MCNC: 2.8 GM/DL
GLUCOSE BLD-MCNC: 88 MG/DL (ref 70–100)
GLUCOSE UR STRIP-MCNC: NEGATIVE MG/DL
HCT VFR BLD AUTO: 41.6 % (ref 34.5–44)
HGB BLD-MCNC: 14 G/DL (ref 11.5–15.5)
HGB UR QL STRIP.AUTO: NEGATIVE
HYALINE CASTS UR QL AUTO: ABNORMAL /LPF
IMM GRANULOCYTES # BLD AUTO: 0.01 10*3/MM3 (ref 0–0.03)
IMM GRANULOCYTES NFR BLD AUTO: 0.1 % (ref 0–0.6)
KETONES UR QL STRIP: NEGATIVE
LEUKOCYTE ESTERASE UR QL STRIP.AUTO: NEGATIVE
LIPASE SERPL-CCNC: 25 U/L (ref 6–51)
LYMPHOCYTES # BLD AUTO: 1.59 10*3/MM3 (ref 0.6–4.8)
LYMPHOCYTES NFR BLD AUTO: 21.5 % (ref 24–44)
MCH RBC QN AUTO: 32.4 PG (ref 27–31)
MCHC RBC AUTO-ENTMCNC: 33.7 G/DL (ref 32–36)
MCV RBC AUTO: 96.3 FL (ref 80–99)
MONOCYTES # BLD AUTO: 0.77 10*3/MM3 (ref 0–1)
MONOCYTES NFR BLD AUTO: 10.4 % (ref 0–12)
NEUTROPHILS # BLD AUTO: 4.82 10*3/MM3 (ref 1.5–8.3)
NEUTROPHILS NFR BLD AUTO: 65.2 % (ref 41–71)
NITRITE UR QL STRIP: POSITIVE
PH UR STRIP.AUTO: 7 [PH] (ref 5–8)
PLATELET # BLD AUTO: 299 10*3/MM3 (ref 150–450)
PMV BLD AUTO: 9.6 FL (ref 6–12)
POTASSIUM BLD-SCNC: 3.4 MMOL/L (ref 3.5–5.5)
PROT SERPL-MCNC: 7.2 G/DL (ref 5.7–8.2)
PROT UR QL STRIP: NEGATIVE
RBC # BLD AUTO: 4.32 10*6/MM3 (ref 3.89–5.14)
RBC # UR: ABNORMAL /HPF
REF LAB TEST METHOD: ABNORMAL
SODIUM BLD-SCNC: 137 MMOL/L (ref 132–146)
SP GR UR STRIP: <=1.005 (ref 1–1.03)
SQUAMOUS #/AREA URNS HPF: ABNORMAL /HPF
TROPONIN I SERPL-MCNC: 0 NG/ML (ref 0–0.07)
TROPONIN I SERPL-MCNC: 0.01 NG/ML
UROBILINOGEN UR QL STRIP: ABNORMAL
WBC NRBC COR # BLD: 7.39 10*3/MM3 (ref 3.5–10.8)
WBC UR QL AUTO: ABNORMAL /HPF

## 2018-12-25 PROCEDURE — 25010000002 PROMETHAZINE PER 50 MG: Performed by: EMERGENCY MEDICINE

## 2018-12-25 PROCEDURE — 85025 COMPLETE CBC W/AUTO DIFF WBC: CPT | Performed by: EMERGENCY MEDICINE

## 2018-12-25 PROCEDURE — 93005 ELECTROCARDIOGRAM TRACING: CPT | Performed by: EMERGENCY MEDICINE

## 2018-12-25 PROCEDURE — 80053 COMPREHEN METABOLIC PANEL: CPT | Performed by: EMERGENCY MEDICINE

## 2018-12-25 PROCEDURE — 84484 ASSAY OF TROPONIN QUANT: CPT | Performed by: EMERGENCY MEDICINE

## 2018-12-25 PROCEDURE — 96374 THER/PROPH/DIAG INJ IV PUSH: CPT

## 2018-12-25 PROCEDURE — 83690 ASSAY OF LIPASE: CPT | Performed by: EMERGENCY MEDICINE

## 2018-12-25 PROCEDURE — 71045 X-RAY EXAM CHEST 1 VIEW: CPT

## 2018-12-25 PROCEDURE — 99284 EMERGENCY DEPT VISIT MOD MDM: CPT

## 2018-12-25 PROCEDURE — 83880 ASSAY OF NATRIURETIC PEPTIDE: CPT | Performed by: EMERGENCY MEDICINE

## 2018-12-25 PROCEDURE — 81001 URINALYSIS AUTO W/SCOPE: CPT | Performed by: EMERGENCY MEDICINE

## 2018-12-25 PROCEDURE — 84484 ASSAY OF TROPONIN QUANT: CPT

## 2018-12-25 PROCEDURE — 63710000001 DIPHENHYDRAMINE PER 50 MG: Performed by: EMERGENCY MEDICINE

## 2018-12-25 PROCEDURE — 85379 FIBRIN DEGRADATION QUANT: CPT | Performed by: EMERGENCY MEDICINE

## 2018-12-25 RX ORDER — BACITRACIN, NEOMYCIN, POLYMYXIN B 400; 3.5; 5 [USP'U]/G; MG/G; [USP'U]/G
1 OINTMENT TOPICAL ONCE
Status: DISCONTINUED | OUTPATIENT
Start: 2018-12-25 | End: 2018-12-26 | Stop reason: HOSPADM

## 2018-12-25 RX ORDER — DIPHENHYDRAMINE HCL 50 MG
50 CAPSULE ORAL ONCE
Status: COMPLETED | OUTPATIENT
Start: 2018-12-25 | End: 2018-12-25

## 2018-12-25 RX ORDER — ONDANSETRON 4 MG/1
4 TABLET, FILM COATED ORAL EVERY 8 HOURS PRN
Qty: 15 TABLET | Refills: 0 | OUTPATIENT
Start: 2018-12-25 | End: 2020-01-22

## 2018-12-25 RX ORDER — PROMETHAZINE HYDROCHLORIDE 25 MG/ML
12.5 INJECTION, SOLUTION INTRAMUSCULAR; INTRAVENOUS ONCE
Status: COMPLETED | OUTPATIENT
Start: 2018-12-25 | End: 2018-12-25

## 2018-12-25 RX ORDER — HYDROXYZINE HYDROCHLORIDE 25 MG/1
25 TABLET, FILM COATED ORAL EVERY 8 HOURS PRN
Qty: 20 TABLET | Refills: 0 | Status: SHIPPED | OUTPATIENT
Start: 2018-12-25 | End: 2019-07-04

## 2018-12-25 RX ORDER — ALUMINA, MAGNESIA, AND SIMETHICONE 2400; 2400; 240 MG/30ML; MG/30ML; MG/30ML
15 SUSPENSION ORAL ONCE
Status: DISCONTINUED | OUTPATIENT
Start: 2018-12-25 | End: 2018-12-26 | Stop reason: HOSPADM

## 2018-12-25 RX ADMIN — PROMETHAZINE HYDROCHLORIDE 12.5 MG: 25 INJECTION INTRAMUSCULAR; INTRAVENOUS at 19:01

## 2018-12-25 RX ADMIN — SODIUM CHLORIDE 1000 ML: 9 INJECTION, SOLUTION INTRAVENOUS at 19:02

## 2018-12-25 RX ADMIN — DIPHENHYDRAMINE HYDROCHLORIDE 50 MG: 50 CAPSULE ORAL at 23:24

## 2018-12-26 DIAGNOSIS — H53.9 VISUAL CHANGES: ICD-10-CM

## 2018-12-26 DIAGNOSIS — I10 ESSENTIAL HYPERTENSION: ICD-10-CM

## 2018-12-26 RX ORDER — PROPRANOLOL HYDROCHLORIDE 20 MG/1
20 TABLET ORAL 2 TIMES DAILY
Qty: 60 TABLET | Refills: 2 | Status: SHIPPED | OUTPATIENT
Start: 2018-12-26 | End: 2019-07-04

## 2018-12-26 RX ORDER — HYDROCHLOROTHIAZIDE 50 MG/1
50 TABLET ORAL DAILY
Qty: 30 TABLET | Refills: 5 | Status: SHIPPED | OUTPATIENT
Start: 2018-12-26 | End: 2020-12-01

## 2018-12-26 RX ORDER — HYDROXYZINE HYDROCHLORIDE 25 MG/1
25 TABLET, FILM COATED ORAL 3 TIMES DAILY PRN
Qty: 50 TABLET | Refills: 1 | OUTPATIENT
Start: 2018-12-26 | End: 2020-01-22

## 2018-12-26 RX ORDER — LOSARTAN POTASSIUM 100 MG/1
100 TABLET ORAL DAILY
Qty: 90 TABLET | Refills: 1 | Status: SHIPPED | OUTPATIENT
Start: 2018-12-26 | End: 2020-12-01

## 2018-12-26 RX ORDER — LOSARTAN POTASSIUM 100 MG/1
100 TABLET ORAL DAILY
Qty: 30 TABLET | Refills: 1 | Status: SHIPPED | OUTPATIENT
Start: 2018-12-26 | End: 2018-12-26 | Stop reason: SDUPTHER

## 2019-01-02 ENCOUNTER — OFFICE VISIT (OUTPATIENT)
Dept: GASTROENTEROLOGY | Facility: CLINIC | Age: 39
End: 2019-01-02

## 2019-01-02 VITALS
BODY MASS INDEX: 29.41 KG/M2 | WEIGHT: 205 LBS | DIASTOLIC BLOOD PRESSURE: 116 MMHG | HEART RATE: 95 BPM | SYSTOLIC BLOOD PRESSURE: 187 MMHG

## 2019-01-02 DIAGNOSIS — R13.19 ESOPHAGEAL DYSPHAGIA: Primary | ICD-10-CM

## 2019-01-02 DIAGNOSIS — Z80.0 FAMILY HISTORY OF COLON CANCER: ICD-10-CM

## 2019-01-02 DIAGNOSIS — R07.89 ATYPICAL CHEST PAIN: ICD-10-CM

## 2019-01-02 DIAGNOSIS — K21.9 GASTROESOPHAGEAL REFLUX DISEASE WITHOUT ESOPHAGITIS: ICD-10-CM

## 2019-01-02 PROCEDURE — 99204 OFFICE O/P NEW MOD 45 MIN: CPT | Performed by: INTERNAL MEDICINE

## 2019-01-02 NOTE — PROGRESS NOTES
PCP:  Luis Whitman MD Pence, Cory, DO  [address] [facility]  [place] [address]    Chief Complaint   Patient presents with   • Abdominal Pain     New patient        HPI   The patient is a 38-year-old who is had troubles with dysphagia.  She can have troubles with solids or liquids.  It seems to be more of a problem with solids.  She points to her mid substernal region.  She does have nausea and vomiting associated with this.  She does have reflux symptoms.  She describes the pain as 7 out of 10 in severity.  The pain usually just lasts about a minute or 2.  At the time she went to the emergency room the pain lasted much longer.  She was diagnosed with reflux and hiatal hernia and some dysmotility on barium swallow.  She had a cholecystectomy due to stones.  The pain does not radiate.  Her maternal grandmother had colon cancer at age 36 and her mother had polyps as well.  She is not sure how old her mother was.  She has been started on Carafate liquid and omeprazole.  She doesn't feel like this is helped to a significant degree.  Her symptoms have been much worse the past 3 weeks.    Allergies   Allergen Reactions   • Lamictal [Lamotrigine] Anaphylaxis   • Amlodipine Irritability   • Rocephin [Ceftriaxone] Hives   • Tetracyclines & Related Other (See Comments)     Low h&h          Current Outpatient Medications:   •  acyclovir (ZOVIRAX) 5 % ointment, Apply 1 application topically to the appropriate area as directed Every 4 (Four) Hours., Disp: 30 g, Rfl: 2  •  famotidine (PEPCID) 40 MG tablet, TAKE 1 TABLET BY MOUTH DAILY, Disp: 90 tablet, Rfl: 1  •  fluticasone (FLONASE) 50 MCG/ACT nasal spray, 2 sprays into each nostril Daily. (Patient taking differently: 2 sprays into each nostril Daily As Needed.), Disp: 1 bottle, Rfl: 0  •  hydrochlorothiazide (HYDRODIURIL) 50 MG tablet, Take 1 tablet by mouth Daily., Disp: 30 tablet, Rfl: 5  •  hydrOXYzine (ATARAX) 25 MG tablet, Take 1 tablet by  mouth Every 8 (Eight) Hours As Needed for Itching., Disp: 20 tablet, Rfl: 0  •  hydrOXYzine (ATARAX) 25 MG tablet, Take 1 tablet by mouth 3 (Three) Times a Day As Needed for Itching. Will Cause Drowsiness., Disp: 50 tablet, Rfl: 1  •  losartan (COZAAR) 100 MG tablet, TAKE 1 TABLET BY MOUTH DAILY, Disp: 90 tablet, Rfl: 1  •  omeprazole (priLOSEC) 40 MG capsule, Take 1 capsule by mouth Daily., Disp: 25 capsule, Rfl: 0  •  ondansetron (ZOFRAN) 4 MG tablet, Take 1 tablet by mouth Every 8 (Eight) Hours As Needed for Nausea., Disp: 15 tablet, Rfl: 0  •  PROAIR  (90 Base) MCG/ACT inhaler, Inhale 2 puffs Every 6 (Six) Hours As Needed., Disp: , Rfl:   •  promethazine (PHENERGAN) 25 MG suppository, Insert 1 suppository into the rectum Every 6 (Six) Hours As Needed for Nausea or Vomiting., Disp: 9 suppository, Rfl: 0  •  promethazine (PHENERGAN) 25 MG tablet, Take 1 tablet by mouth Every 6 (Six) Hours As Needed for Nausea or Vomiting., Disp: 3 tablet, Rfl: 0  •  promethazine (PHENERGAN) 25 MG tablet, Take 1 tablet by mouth Every 6 (Six) Hours As Needed for Nausea or Vomiting., Disp: 21 tablet, Rfl: 0  •  propranolol (INDERAL) 20 MG tablet, Take 1 tablet by mouth 2 (Two) Times a Day., Disp: 60 tablet, Rfl: 2  •  QUEtiapine (SEROquel) 100 MG tablet, Take 100 mg by mouth 2 (Two) Times a Day., Disp: , Rfl:   •  sucralfate (CARAFATE) 1 GM/10ML suspension, Take 10 mL by mouth 4 (Four) Times a Day With Meals & at Bedtime., Disp: 420 mL, Rfl: 0  •  topiramate (TOPAMAX) 50 MG tablet, Take 50 mg by mouth 2 (Two) Times a Day., Disp: , Rfl:   •  valACYclovir (VALTREX) 1000 MG tablet, Take 1 tablet by mouth 2 (Two) Times a Day., Disp: 20 tablet, Rfl: 2  •  ZENZEDI 30 MG tablet, Take 1 tablet by mouth 2 (Two) Times a Day., Disp: , Rfl: 0  •  ziprasidone (GEODON) 40 MG capsule, Take 1 capsule by mouth 2 (Two) Times a Day., Disp: , Rfl: 5     Past Medical History:   Diagnosis Date   • ADHD (attention deficit hyperactivity disorder)     • Asthma    • Bipolar 1 disorder (CMS/HCC)    • Hemorrhage    • Herpes    • Hypertension    • Migraine    • Urinary tract infection        Past Surgical History:   Procedure Laterality Date   • CHOLECYSTECTOMY     • HYSTERECTOMY     • OOPHORECTOMY  2015   • ROTATOR CUFF REPAIR     • ULNAR NERVE REPAIR          Social History     Socioeconomic History   • Marital status:      Spouse name: Not on file   • Number of children: Not on file   • Years of education: Not on file   • Highest education level: Not on file   Social Needs   • Financial resource strain: Not on file   • Food insecurity - worry: Not on file   • Food insecurity - inability: Not on file   • Transportation needs - medical: Not on file   • Transportation needs - non-medical: Not on file   Occupational History   • Not on file   Tobacco Use   • Smoking status: Light Tobacco Smoker     Types: Cigarettes     Start date: 1998   • Smokeless tobacco: Never Used   • Tobacco comment: SOCIAL SMOKER   Substance and Sexual Activity   • Alcohol use: No   • Drug use: No   • Sexual activity: Defer   Other Topics Concern   • Not on file   Social History Narrative   • Not on file        Family History   Problem Relation Age of Onset   • Arthritis Mother    • Hypertension Mother    • Thyroid disease Mother    • Thyroid cancer Mother    • Heart disease Father    • Hypertension Father    • Hyperlipidemia Father    • Colon cancer Maternal Grandmother    • Breast cancer Paternal Grandmother    • Diabetes Paternal Grandmother         Review of Systems   Constitutional: Negative for unexpected weight loss.   HENT: Negative for trouble swallowing.    Eyes: Negative.    Respiratory: Negative.    Gastrointestinal: Negative for abdominal distention, abdominal pain, anal bleeding, blood in stool, constipation, diarrhea, nausea, rectal pain, vomiting, GERD and indigestion.   Endocrine: Negative.    Genitourinary: Negative.    Musculoskeletal: Negative.    Skin: Negative.     Allergic/Immunologic: Negative.    Neurological: Negative.    Hematological: Negative.    Psychiatric/Behavioral: Negative.         Vitals:    01/02/19 1042   BP: (!) 187/116   Pulse: 95        Physical Exam   General Appearance: Alert, in no acute distress   Head: Normocephalic, without obvious abnormality, atraumatic   Eyes: Lids and lashes normal, conjunctivae and sclerae normal, no icterus, no pallor, corneas clear, PERRLA   Ears: Ears appear intact with no abnormalities noted   Throat: No oral lesions, no thrush, oral mucosa moist   Neck: No adenopathy, supple, trachea midline, no thyromegaly, no JVD   Lungs: Clear to auscultation,respirations regular, even and unlabored Heart: Regular rhythm and normal rate, normal S1 and S2, no murmur, no gallop, no rub, no click   Chest Wall: Symmetrical respiratory expansion   Abdomen: Normal bowel sounds, no masses, no organomegaly, soft non-tender, non-distended, no guarding, no rebound tenderness   Extremities: Moves all extremities well, no edema, no cyanosis, no redness   Skin: No bleeding, bruising or rash   Neurologic: Cranial nerves 2 - 12 grossly intact, no focal deficits       Bebe was seen today for abdominal pain.    Diagnoses and all orders for this visit:    Esophageal dysphagia    Atypical chest pain    Gastroesophageal reflux disease without esophagitis    Family history of colon cancer    Impression and plan #1 Atypical chest pain and dysphagia: I am going to suggest an upper endoscopy to evaluate.  We will look for signs of esophagitis, eosinophilic esophagitis, paraesophageal hernias, or other explanation for her pain.  She is postcholecystectomy so that isn't as much of an issue.  Her pain is largely fleeting and certainly seems to be related to eating and swallowing.  She can double up on her proton pump inhibitor for now.  We talked about the different options at this point.  She will continue her Carafate.      #2 gastroesophageal reflux: Again  she will continue her proton pump inhibitor for now     #3 family history of colonic neoplasia: She will likely need a colonoscopy in the near future as well.  I am going to try and find out how old her mother was when she had colon polyps.       Cecil Arroyo MD

## 2019-01-07 ENCOUNTER — OUTSIDE FACILITY SERVICE (OUTPATIENT)
Dept: GASTROENTEROLOGY | Facility: CLINIC | Age: 39
End: 2019-01-07

## 2019-01-07 PROCEDURE — 43239 EGD BIOPSY SINGLE/MULTIPLE: CPT | Performed by: INTERNAL MEDICINE

## 2019-01-11 ENCOUNTER — HOSPITAL ENCOUNTER (EMERGENCY)
Facility: HOSPITAL | Age: 39
Discharge: HOME | End: 2019-01-12
Attending: EMERGENCY MEDICINE | Admitting: EMERGENCY MEDICINE

## 2019-01-11 ENCOUNTER — APPOINTMENT (OUTPATIENT)
Dept: GENERAL RADIOLOGY | Facility: HOSPITAL | Age: 39
End: 2019-01-11

## 2019-01-11 DIAGNOSIS — M25.571 ACUTE RIGHT ANKLE PAIN: Primary | ICD-10-CM

## 2019-01-11 DIAGNOSIS — I16.0 HYPERTENSIVE URGENCY: ICD-10-CM

## 2019-01-11 LAB
ALBUMIN SERPL-MCNC: 3.98 G/DL (ref 3.2–4.8)
ALBUMIN/GLOB SERPL: 1.5 G/DL (ref 1.5–2.5)
ALP SERPL-CCNC: 68 U/L (ref 25–100)
ALT SERPL W P-5'-P-CCNC: 20 U/L (ref 7–40)
ANION GAP SERPL CALCULATED.3IONS-SCNC: 7 MMOL/L (ref 3–11)
AST SERPL-CCNC: 22 U/L (ref 0–33)
BASOPHILS # BLD AUTO: 0.02 10*3/MM3 (ref 0–0.2)
BASOPHILS NFR BLD AUTO: 0.3 % (ref 0–1)
BILIRUB SERPL-MCNC: 0.5 MG/DL (ref 0.3–1.2)
BUN BLD-MCNC: 16 MG/DL (ref 9–23)
BUN/CREAT SERPL: 18 (ref 7–25)
CALCIUM SPEC-SCNC: 8.6 MG/DL (ref 8.7–10.4)
CHLORIDE SERPL-SCNC: 108 MMOL/L (ref 99–109)
CO2 SERPL-SCNC: 24 MMOL/L (ref 20–31)
CREAT BLD-MCNC: 0.89 MG/DL (ref 0.6–1.3)
CRP SERPL-MCNC: 1.09 MG/DL (ref 0–1)
DEPRECATED RDW RBC AUTO: 45.8 FL (ref 37–54)
EOSINOPHIL # BLD AUTO: 0.19 10*3/MM3 (ref 0–0.3)
EOSINOPHIL NFR BLD AUTO: 2.6 % (ref 0–3)
ERYTHROCYTE [DISTWIDTH] IN BLOOD BY AUTOMATED COUNT: 13.1 % (ref 11.3–14.5)
ERYTHROCYTE [SEDIMENTATION RATE] IN BLOOD: 8 MM/HR (ref 0–20)
GFR SERPL CREATININE-BSD FRML MDRD: 71 ML/MIN/1.73
GLOBULIN UR ELPH-MCNC: 2.6 GM/DL
GLUCOSE BLD-MCNC: 91 MG/DL (ref 70–100)
HCT VFR BLD AUTO: 37.3 % (ref 34.5–44)
HGB BLD-MCNC: 12.5 G/DL (ref 11.5–15.5)
HOLD SPECIMEN: NORMAL
HOLD SPECIMEN: NORMAL
IMM GRANULOCYTES # BLD AUTO: 0 10*3/MM3 (ref 0–0.03)
IMM GRANULOCYTES NFR BLD AUTO: 0 % (ref 0–0.6)
LYMPHOCYTES # BLD AUTO: 1.52 10*3/MM3 (ref 0.6–4.8)
LYMPHOCYTES NFR BLD AUTO: 20.5 % (ref 24–44)
MCH RBC QN AUTO: 32.2 PG (ref 27–31)
MCHC RBC AUTO-ENTMCNC: 33.5 G/DL (ref 32–36)
MCV RBC AUTO: 96.1 FL (ref 80–99)
MONOCYTES # BLD AUTO: 0.71 10*3/MM3 (ref 0–1)
MONOCYTES NFR BLD AUTO: 9.6 % (ref 0–12)
NEUTROPHILS # BLD AUTO: 4.99 10*3/MM3 (ref 1.5–8.3)
NEUTROPHILS NFR BLD AUTO: 67 % (ref 41–71)
PLATELET # BLD AUTO: 285 10*3/MM3 (ref 150–450)
PMV BLD AUTO: 10 FL (ref 6–12)
POTASSIUM BLD-SCNC: 4 MMOL/L (ref 3.5–5.5)
PROT SERPL-MCNC: 6.6 G/DL (ref 5.7–8.2)
RBC # BLD AUTO: 3.88 10*6/MM3 (ref 3.89–5.14)
SODIUM BLD-SCNC: 139 MMOL/L (ref 132–146)
URATE SERPL-MCNC: 4.2 MG/DL (ref 3.1–7.8)
WBC NRBC COR # BLD: 7.43 10*3/MM3 (ref 3.5–10.8)
WHOLE BLOOD HOLD SPECIMEN: NORMAL
WHOLE BLOOD HOLD SPECIMEN: NORMAL

## 2019-01-11 PROCEDURE — 96375 TX/PRO/DX INJ NEW DRUG ADDON: CPT

## 2019-01-11 PROCEDURE — 87040 BLOOD CULTURE FOR BACTERIA: CPT | Performed by: EMERGENCY MEDICINE

## 2019-01-11 PROCEDURE — 25010000002 FENTANYL CITRATE (PF) 100 MCG/2ML SOLUTION: Performed by: EMERGENCY MEDICINE

## 2019-01-11 PROCEDURE — 86140 C-REACTIVE PROTEIN: CPT | Performed by: EMERGENCY MEDICINE

## 2019-01-11 PROCEDURE — 80053 COMPREHEN METABOLIC PANEL: CPT | Performed by: EMERGENCY MEDICINE

## 2019-01-11 PROCEDURE — 84145 PROCALCITONIN (PCT): CPT | Performed by: EMERGENCY MEDICINE

## 2019-01-11 PROCEDURE — 84550 ASSAY OF BLOOD/URIC ACID: CPT | Performed by: EMERGENCY MEDICINE

## 2019-01-11 PROCEDURE — 25010000002 LORAZEPAM PER 2 MG: Performed by: EMERGENCY MEDICINE

## 2019-01-11 PROCEDURE — 96374 THER/PROPH/DIAG INJ IV PUSH: CPT

## 2019-01-11 PROCEDURE — 85652 RBC SED RATE AUTOMATED: CPT | Performed by: EMERGENCY MEDICINE

## 2019-01-11 PROCEDURE — 85025 COMPLETE CBC W/AUTO DIFF WBC: CPT | Performed by: EMERGENCY MEDICINE

## 2019-01-11 PROCEDURE — 36415 COLL VENOUS BLD VENIPUNCTURE: CPT | Performed by: EMERGENCY MEDICINE

## 2019-01-11 PROCEDURE — 99283 EMERGENCY DEPT VISIT LOW MDM: CPT

## 2019-01-11 PROCEDURE — 73610 X-RAY EXAM OF ANKLE: CPT

## 2019-01-11 RX ORDER — LORAZEPAM 2 MG/ML
0.5 INJECTION INTRAMUSCULAR ONCE
Status: COMPLETED | OUTPATIENT
Start: 2019-01-11 | End: 2019-01-11

## 2019-01-11 RX ORDER — SODIUM CHLORIDE 0.9 % (FLUSH) 0.9 %
10 SYRINGE (ML) INJECTION AS NEEDED
Status: DISCONTINUED | OUTPATIENT
Start: 2019-01-11 | End: 2019-01-12 | Stop reason: HOSPADM

## 2019-01-11 RX ORDER — FENTANYL CITRATE 50 UG/ML
50 INJECTION, SOLUTION INTRAMUSCULAR; INTRAVENOUS ONCE
Status: COMPLETED | OUTPATIENT
Start: 2019-01-11 | End: 2019-01-11

## 2019-01-11 RX ADMIN — LORAZEPAM 0.5 MG: 2 INJECTION INTRAMUSCULAR; INTRAVENOUS at 22:27

## 2019-01-11 RX ADMIN — FENTANYL CITRATE 50 MCG: 50 INJECTION, SOLUTION INTRAMUSCULAR; INTRAVENOUS at 22:26

## 2019-01-11 RX ADMIN — SODIUM CHLORIDE 1000 ML: 9 INJECTION, SOLUTION INTRAVENOUS at 22:54

## 2019-01-12 VITALS
WEIGHT: 185 LBS | RESPIRATION RATE: 18 BRPM | TEMPERATURE: 98.6 F | SYSTOLIC BLOOD PRESSURE: 159 MMHG | HEART RATE: 91 BPM | OXYGEN SATURATION: 100 % | BODY MASS INDEX: 26.48 KG/M2 | HEIGHT: 70 IN | DIASTOLIC BLOOD PRESSURE: 100 MMHG

## 2019-01-12 LAB — PROCALCITONIN SERPL-MCNC: <0.05 NG/ML

## 2019-01-12 PROCEDURE — 96375 TX/PRO/DX INJ NEW DRUG ADDON: CPT

## 2019-01-12 PROCEDURE — 25010000002 HYDROMORPHONE PER 4 MG: Performed by: EMERGENCY MEDICINE

## 2019-01-12 RX ORDER — HYDROMORPHONE HYDROCHLORIDE 1 MG/ML
0.5 INJECTION, SOLUTION INTRAMUSCULAR; INTRAVENOUS; SUBCUTANEOUS
Status: DISCONTINUED | OUTPATIENT
Start: 2019-01-12 | End: 2019-01-12 | Stop reason: HOSPADM

## 2019-01-12 RX ORDER — CYCLOBENZAPRINE HCL 10 MG
10 TABLET ORAL 3 TIMES DAILY PRN
Qty: 12 TABLET | Refills: 0 | OUTPATIENT
Start: 2019-01-12 | End: 2020-01-22

## 2019-01-12 RX ORDER — NAPROXEN 500 MG/1
500 TABLET ORAL 2 TIMES DAILY PRN
Qty: 10 TABLET | Refills: 0 | OUTPATIENT
Start: 2019-01-12 | End: 2020-01-22

## 2019-01-12 RX ORDER — LABETALOL HYDROCHLORIDE 5 MG/ML
10 INJECTION, SOLUTION INTRAVENOUS ONCE
Status: COMPLETED | OUTPATIENT
Start: 2019-01-12 | End: 2019-01-12

## 2019-01-12 RX ADMIN — LABETALOL 20 MG/4 ML (5 MG/ML) INTRAVENOUS SYRINGE 10 MG: at 00:24

## 2019-01-12 RX ADMIN — HYDROMORPHONE HYDROCHLORIDE 0.5 MG: 1 INJECTION, SOLUTION INTRAMUSCULAR; INTRAVENOUS; SUBCUTANEOUS at 00:25

## 2019-01-12 NOTE — ED PROVIDER NOTES
Subjective   Ms. Bebe Rincon is a 39 y.o. female who presents to the ED with c/o R ankle pain onset last night. Pt reports last night she was walking and noticed a vague burning sensation in her right ankle, which has progressively worsened since. Upon waking this morning, the pain worsened significantly with accompanied swelling, tenderness, and erythema, all of which are localized to the medial aspect of the right ankle. The pain exacerbates significantly when bearing weight and with minimal movement. She also complains of decreased ROM secondary to pain and intermittent rash to face and chest with accompanied burning sensation, however she notes rash is an ongoing sx for which she is seeking further evaluation by a specialist. She denies injury, trauma, fever, and any other acute sx at this time. Pt also has hx of HTN, asthma, bipolar 1 disorder, hemorrhage, ADHD, UTI, migraine, herpes, hysterectomy, cholecystectomy, ulnar nerve repair, rotator cuff repair, oophorectomy.             History provided by:  Patient  Lower Extremity Issue   Location:  Ankle  Injury: no    Ankle location:  R ankle  Pain details:     Radiates to:  Does not radiate    Severity:  Moderate    Onset quality:  Sudden    Duration:  1 day    Timing:  Constant    Progression:  Worsening  Chronicity:  New  Relieved by:  None tried  Worsened by:  Bearing weight and activity  Ineffective treatments:  None tried  Associated symptoms: decreased ROM and swelling    Associated symptoms: no fever        Review of Systems   Constitutional: Negative for fever.   Musculoskeletal: Positive for arthralgias (Medial R ankle pain with accompanied swelling, erythema, tenderness) and gait problem (decreased ROM R ankle secondary to pain).   Skin: Positive for rash (chest and face (recurrent)).   All other systems reviewed and are negative.      Past Medical History:   Diagnosis Date   • ADHD (attention deficit hyperactivity disorder)    • Asthma    •  Bipolar 1 disorder (CMS/HCC)    • Hemorrhage    • Herpes    • Hypertension    • Migraine    • Urinary tract infection        Allergies   Allergen Reactions   • Lamictal [Lamotrigine] Anaphylaxis   • Amlodipine Irritability   • Rocephin [Ceftriaxone] Hives   • Tetracyclines & Related Other (See Comments)     Low h&h       Past Surgical History:   Procedure Laterality Date   • CHOLECYSTECTOMY     • HYSTERECTOMY     • OOPHORECTOMY  2015   • ROTATOR CUFF REPAIR     • ULNAR NERVE REPAIR         Family History   Problem Relation Age of Onset   • Arthritis Mother    • Hypertension Mother    • Thyroid disease Mother    • Thyroid cancer Mother    • Heart disease Father    • Hypertension Father    • Hyperlipidemia Father    • Colon cancer Maternal Grandmother    • Breast cancer Paternal Grandmother    • Diabetes Paternal Grandmother        Social History     Socioeconomic History   • Marital status:      Spouse name: Not on file   • Number of children: Not on file   • Years of education: Not on file   • Highest education level: Not on file   Tobacco Use   • Smoking status: Light Tobacco Smoker     Types: Cigarettes     Start date: 1998   • Smokeless tobacco: Never Used   • Tobacco comment: SOCIAL SMOKER   Substance and Sexual Activity   • Alcohol use: No   • Drug use: No   • Sexual activity: Defer         Objective   Physical Exam   Constitutional: She is oriented to person, place, and time. She appears well-developed and well-nourished. No distress.   HENT:   Head: Normocephalic and atraumatic.   Eyes: Conjunctivae are normal. No scleral icterus.   Neck: Normal range of motion. Neck supple.   Cardiovascular: Normal rate, regular rhythm and normal heart sounds. Exam reveals no gallop and no friction rub.   No murmur heard.  Pulmonary/Chest: Effort normal and breath sounds normal. No stridor. No respiratory distress. She has no wheezes. She has no rales.   Abdominal: Soft. Bowel sounds are normal. There is no  tenderness. There is no rebound and no guarding.   Musculoskeletal:        Right ankle: She exhibits decreased range of motion and swelling. Tenderness.   Medial right ankle is erythematous, swollen, and tender to touch. Surprisingly, no increased warmth appreciated. Palpation to lateral aspect does not illicit pain and no erythema or swelling appreciated in these regions. Pain is reproduced with palpation and also with range of motion of R ankle.    Neurological: She is alert and oriented to person, place, and time.   Skin: Skin is warm and dry.   Psychiatric: Her behavior is normal. Her mood appears anxious.   Nursing note and vitals reviewed.      Procedures         ED Course  ED Course as of Jan 12 0238   Sat Jan 12, 2019   0019 I discussed the imaging along with the laboratory results with the patient.  Given the lack of white count elevation along with the lack of time and elevation it does not appear to be infectious in nature.  This is consistent with my suspicions from the physical exam.  The etiology is not clear at this time.  I discussed the possible fracture noted on the radiograph.  She denies any recent trauma but does state that 5 weeks ago she twisted her ankle and swollen for about a week at that time.  I told her that the only way to definitively rule out fractures to the CAT scan.  She is pleasantly declined this tonight as states that she would rather be discharged at this time will follow-up with orthopedics.  She is very comfortable with the plan and I have answered all of her questions  [CP]   0022 Given patient's recent nonspecific rashes, this erythema could be directly associated with that process and I have given patient a referral to rheumatology also.  She is now following up with her primary care physician, gastroenterologist to follow up on her endoscopy esophageal biopsy results, orthopedics for persistent right ankle pain, and endocrinology.  [CP]   0024 Regarding immobilization of  "the ankle, patient is pleasantly declined a splint that she states when it does H she needs to be able to access the site.  She states that she was using an Ace wrap earlier and she is also not comfortable with this at this time and she will replace the Ace wrap at home if needed.  As the patient does require something to stabilize her ankle and will apply boot so that she can remove it as needed.  Patient will also use crutches until her symptoms resolve or clearance from orthopedics.  [CP]      ED Course User Index  [CP] Ángel Serra DO      No results found for this or any previous visit (from the past 24 hour(s)).  Note: In addition to lab results from this visit, the labs listed above may include labs taken at another facility or during a different encounter within the last 24 hours. Please correlate lab times with ED admission and discharge times for further clarification of the services performed during this visit.    No orders to display     Vitals:    01/11/19 2024   BP: (!) 193/113   Pulse: 119   Resp: 18   Temp: 98.6 °F (37 °C)   SpO2: 100%   Weight: 83.9 kg (185 lb)   Height: 177.8 cm (70\")     Medications   sodium chloride 0.9 % flush 10 mL (not administered)   fentaNYL citrate (PF) (SUBLIMAZE) injection 50 mcg (not administered)   LORazepam (ATIVAN) injection 0.5 mg (not administered)     ECG/EMG Results (last 24 hours)     ** No results found for the last 24 hours. **                          [facility]    Final diagnoses:   Acute right ankle pain   Hypertensive urgency       Documentation assistance provided by swetha Bowles.  Information recorded by the scribe was done at my direction and has been verified and validated by me.     Van Bowles  01/11/19 4281       Ángel Serra DO  01/12/19 8898    "

## 2019-01-12 NOTE — DISCHARGE INSTRUCTIONS
Your ankle x-ray was nonspecific and raised the possibility of a fracture.  It is important that you follow-up with orthopedics and return to the emergency department if symptoms are worsening or other concerns arise.

## 2019-01-14 ENCOUNTER — TELEPHONE (OUTPATIENT)
Dept: INTERNAL MEDICINE | Facility: CLINIC | Age: 39
End: 2019-01-14

## 2019-01-14 DIAGNOSIS — M13.0 POLYARTHRITIS: Primary | ICD-10-CM

## 2019-01-14 NOTE — TELEPHONE ENCOUNTER
----- Message from Silvina Govea sent at 1/14/2019  3:51 PM EST -----  [phone]    Pt went to [facility] on Friday and they want her to go to [facility] on [address].  Can you refer?

## 2019-01-14 NOTE — TELEPHONE ENCOUNTER
S/W PT, STATES SHE WAS SEEN AT ER AND THE DOCTOR THERE IS THE ONE THAT TOLD HER SHE SHOULD SEE [facility] TO SEE IF COULD GET ANSWERS.  STATES SHE WENT TO [facility] AND WAS HOPING HE HAD PUT ALL THAT INFO IN HER NOTE.  STAES SHE HAS BEEN HAVING RANDOM RASHES ON HER FACE, CHEST AND ARMS FOR 3-4 MONTHS AND THAT HE WENT TO HER ANKLE THIS WEEKEND. STATES IT IS A BURNING RASH AND SHE HAS SOME JOINT PAINS AS WELL.  EXPL WILL CHECK WITH DR COSTELLO AND LET HER KNOW BUT THAT HE IS GONE FOR THE DAY BUT WILL GET MESSAGE TOMORROW.

## 2019-01-15 NOTE — TELEPHONE ENCOUNTER
PT NOTIFIED THAT DR COSTELLO ENTERED REFERRAL AND THAT SOMEONE WILL CONTACT HER REGARDING APPT.  VERB APPREC.

## 2019-01-16 ENCOUNTER — OFFICE VISIT (OUTPATIENT)
Dept: ORTHOPEDIC SURGERY | Facility: CLINIC | Age: 39
End: 2019-01-16

## 2019-01-16 VITALS — HEART RATE: 103 BPM | BODY MASS INDEX: 27.35 KG/M2 | WEIGHT: 191 LBS | OXYGEN SATURATION: 98 % | HEIGHT: 70 IN

## 2019-01-16 DIAGNOSIS — S82.891A CLOSED FRACTURE OF RIGHT ANKLE, INITIAL ENCOUNTER: ICD-10-CM

## 2019-01-16 DIAGNOSIS — M25.571 RIGHT ANKLE PAIN, UNSPECIFIED CHRONICITY: Primary | ICD-10-CM

## 2019-01-16 LAB
BACTERIA SPEC AEROBE CULT: NORMAL
BACTERIA SPEC AEROBE CULT: NORMAL

## 2019-01-16 PROCEDURE — 99204 OFFICE O/P NEW MOD 45 MIN: CPT | Performed by: ORTHOPAEDIC SURGERY

## 2019-01-16 RX ORDER — QUETIAPINE FUMARATE 25 MG/1
TABLET, FILM COATED ORAL
Refills: 1 | COMMUNITY
Start: 2018-12-22 | End: 2020-11-25

## 2019-01-16 NOTE — PROGRESS NOTES
[facility] [place] [facility] Note    Subjective     Chief Complaint   Patient presents with   • Right Ankle - Pain        HPI      Bebe Rincon is a 39 y.o. female.  She twisted her right ankle December 2.  She kept walking on.  More more painful.  Last Thursday is very swollen and red.  It is worse with walking standing and stairs.  She's tried naproxen.  Pain is 6 out of 10.  It is aching and throbbing.  She had x-rays on January 10.        Past Medical History:   Diagnosis Date   • ADHD (attention deficit hyperactivity disorder)    • Asthma    • Bipolar 1 disorder (CMS/HCC)    • Hemorrhage    • Herpes    • Hypertension    • Migraine    • Urinary tract infection       Past Surgical History:   Procedure Laterality Date   • CHOLECYSTECTOMY     • HYSTERECTOMY     • OOPHORECTOMY  2015   • ROTATOR CUFF REPAIR     • ULNAR NERVE REPAIR        Family History   Problem Relation Age of Onset   • Arthritis Mother    • Hypertension Mother    • Thyroid disease Mother    • Thyroid cancer Mother    • Heart disease Father    • Hypertension Father    • Hyperlipidemia Father    • Colon cancer Maternal Grandmother    • Breast cancer Paternal Grandmother    • Diabetes Paternal Grandmother      Social History     Socioeconomic History   • Marital status:      Spouse name: Not on file   • Number of children: Not on file   • Years of education: Not on file   • Highest education level: Not on file   Social Needs   • Financial resource strain: Not on file   • Food insecurity - worry: Not on file   • Food insecurity - inability: Not on file   • Transportation needs - medical: Not on file   • Transportation needs - non-medical: Not on file   Occupational History   • Not on file   Tobacco Use   • Smoking status: Light Tobacco Smoker     Types: Cigarettes     Start date: 1998   • Smokeless tobacco: Never Used   • Tobacco comment: SOCIAL SMOKER   Substance and Sexual Activity   • Alcohol use: No   • Drug use: No   • Sexual  activity: Defer   Other Topics Concern   • Not on file   Social History Narrative   • Not on file      Current Outpatient Medications on File Prior to Visit   Medication Sig Dispense Refill   • acyclovir (ZOVIRAX) 5 % ointment Apply 1 application topically to the appropriate area as directed Every 4 (Four) Hours. 30 g 2   • cyclobenzaprine (FLEXERIL) 10 MG tablet Take 1 tablet by mouth 3 (Three) Times a Day As Needed for Muscle Spasms. 12 tablet 0   • famotidine (PEPCID) 40 MG tablet TAKE 1 TABLET BY MOUTH DAILY 90 tablet 1   • fluticasone (FLONASE) 50 MCG/ACT nasal spray 2 sprays into each nostril Daily. (Patient taking differently: 2 sprays into each nostril Daily As Needed.) 1 bottle 0   • hydrochlorothiazide (HYDRODIURIL) 50 MG tablet Take 1 tablet by mouth Daily. 30 tablet 5   • hydrOXYzine (ATARAX) 25 MG tablet Take 1 tablet by mouth Every 8 (Eight) Hours As Needed for Itching. 20 tablet 0   • hydrOXYzine (ATARAX) 25 MG tablet Take 1 tablet by mouth 3 (Three) Times a Day As Needed for Itching. Will Cause Drowsiness. 50 tablet 1   • losartan (COZAAR) 100 MG tablet TAKE 1 TABLET BY MOUTH DAILY 90 tablet 1   • naproxen (EC NAPROSYN) 500 MG EC tablet Take 1 tablet by mouth 2 (Two) Times a Day As Needed for Mild Pain . 10 tablet 0   • omeprazole (priLOSEC) 40 MG capsule Take 1 capsule by mouth Daily. 25 capsule 0   • ondansetron (ZOFRAN) 4 MG tablet Take 1 tablet by mouth Every 8 (Eight) Hours As Needed for Nausea. 15 tablet 0   • PROAIR  (90 Base) MCG/ACT inhaler Inhale 2 puffs Every 6 (Six) Hours As Needed.     • promethazine (PHENERGAN) 25 MG suppository Insert 1 suppository into the rectum Every 6 (Six) Hours As Needed for Nausea or Vomiting. 9 suppository 0   • promethazine (PHENERGAN) 25 MG tablet Take 1 tablet by mouth Every 6 (Six) Hours As Needed for Nausea or Vomiting. 3 tablet 0   • promethazine (PHENERGAN) 25 MG tablet Take 1 tablet by mouth Every 6 (Six) Hours As Needed for Nausea or Vomiting.  "21 tablet 0   • propranolol (INDERAL) 20 MG tablet Take 1 tablet by mouth 2 (Two) Times a Day. 60 tablet 2   • QUEtiapine (SEROquel) 100 MG tablet Take 100 mg by mouth 2 (Two) Times a Day.     • QUEtiapine (SEROquel) 25 MG tablet TK 1 T PO BID  1   • sucralfate (CARAFATE) 1 GM/10ML suspension Take 10 mL by mouth 4 (Four) Times a Day With Meals & at Bedtime. 420 mL 0   • topiramate (TOPAMAX) 50 MG tablet Take 50 mg by mouth 2 (Two) Times a Day.     • valACYclovir (VALTREX) 1000 MG tablet Take 1 tablet by mouth 2 (Two) Times a Day. 20 tablet 2   • ZENZEDI 30 MG tablet Take 1 tablet by mouth 2 (Two) Times a Day.  0   • ziprasidone (GEODON) 40 MG capsule Take 1 capsule by mouth 2 (Two) Times a Day.  5     No current facility-administered medications on file prior to visit.       Allergies   Allergen Reactions   • Lamictal [Lamotrigine] Anaphylaxis   • Amlodipine Irritability   • Rocephin [Ceftriaxone] Hives   • Tetracyclines & Related Other (See Comments)     Low h&h        The following portions of the patient's history were reviewed and updated as appropriate: allergies, current medications, past family history, past medical history, past social history, past surgical history and problem list.    Review of Systems   Constitutional: Negative.    HENT: Negative.    Eyes: Negative.    Respiratory: Negative.    Cardiovascular: Negative.    Gastrointestinal: Negative.    Endocrine: Negative.    Genitourinary: Negative.    Musculoskeletal: Positive for arthralgias.   Skin: Negative.    Allergic/Immunologic: Negative.    Neurological: Negative.    Hematological: Negative.    Psychiatric/Behavioral: Negative.         Objective      Physical Exam  Pulse 103   Ht 177.8 cm (70\")   Wt 86.6 kg (191 lb)   LMP  (LMP Unknown) Comment: NO PAP SINCE HYSTERECTOMY  SpO2 98%   BMI 27.41 kg/m²     Body mass index is 27.41 kg/m².        GENERAL APPEARANCE: awake, alert & oriented x 3, in no acute distress and well developed, well " nourished  PSYCH: normal mood and affect  LUNGS:  breathing nonlabored, no wheezing  EYES: sclera anicteric, pupils equal  CARDIOVASCULAR: palpable pulses dorsalis pedis, palpable posterior tibial bilaterally. Capillary refill less than 2 seconds  INTEGUMENTARY: skin intact, no clubbing, cyanosis  NEUROLOGIC:  Normal Sensation and reflexes             Ortho Exam  Peripheral Vascular:  Lower Extremity:  Inspection:  Right--rapid capillary refill  Palpation:  Dorsalis pedis pulse:   Right--normal    Neurologic  Sensory:    Light Touch:     Right foot:  Dorsal intact and plantar intact       Overall Assessment of Muscle Strength and Tone:  Lower Extremities:     Right:  Tibialis anterior--5/5    Gastroc soleus--5/5    EHL--5/5    FHL--5/5      Musculoskeletal  Lower Extremity  Ankle/Foot:  Inspection and Palpation:     Right:  Tenderness medial malleolus    Swelling: Very minimal    Effusion:  None    Crepitus:  None       ROM:   Right:  Plantarflexion--50    Dorsiflexion--20    Inversion--10    Eversion--10        Instability:     Right:  Anterior drawer test--negative    Squeeze test--negative    Talar tilt test--negative        Imaging/Studies  Imaging Results (last 7 days)     Procedure Component Value Units Date/Time    XR Ankle 3+ View Right [[id number]] Resulted:  01/16/19 0957     Updated:  01/16/19 0958    Narrative:       Right Ankle X-Ray  Indication: Pain  Views: AP, Lateral, Mortise    Findings:   Healing of medial malleolar fracture compared to x-rays from January 10  No bony lesion  Soft tissues normal  Normal joint spaces with mortise well-aligned, no evidence of syndesmosis   widening    prior studies available for comparison.            Assessment/Plan        ICD-10-CM ICD-9-CM   1. Right ankle pain, unspecified chronicity M25.571 719.47   2. Closed fracture of right ankle, initial encounter S82.891A 824.8       Orders Placed This Encounter   Procedures   • XR Ankle 3+ View Right    She will walk in  the boot.  I'll see her back in 3 weeks for repeat x-rays.    Medical Decision Making  Management Options : over-the-counter medicine and close treatment of fracture or dislocation  Data/Risk: radiology tests and independent visualization of imaging, lab tests, or EMG/NCV    Jon Cheatham MD  01/16/19  9:58 AM         EMR Dragon/Transcription disclaimer:  Much of this encounter note is an electronic transcription of spoken language to printed text. Electronic transcription of spoken language may permit erroneous, or at times, nonsensical words or phrases to be inadvertently transcribed. Although I have reviewed the note for such errors, some may still exist.

## 2019-02-06 ENCOUNTER — OFFICE VISIT (OUTPATIENT)
Dept: ORTHOPEDIC SURGERY | Facility: CLINIC | Age: 39
End: 2019-02-06

## 2019-02-06 VITALS — WEIGHT: 190.92 LBS | BODY MASS INDEX: 27.33 KG/M2 | OXYGEN SATURATION: 98 % | HEIGHT: 70 IN | HEART RATE: 80 BPM

## 2019-02-06 DIAGNOSIS — S82.891D CLOSED FRACTURE OF RIGHT ANKLE WITH ROUTINE HEALING, SUBSEQUENT ENCOUNTER: ICD-10-CM

## 2019-02-06 DIAGNOSIS — Z09 FRACTURE FOLLOW-UP: Primary | ICD-10-CM

## 2019-02-06 PROCEDURE — 99213 OFFICE O/P EST LOW 20 MIN: CPT | Performed by: ORTHOPAEDIC SURGERY

## 2019-02-06 NOTE — PROGRESS NOTES
[facility] [place] [facility] [place]    Subjective     Chief Complaint   Patient presents with   • Right Ankle - Follow-up     3 weeks        HPI      Bebe Rincon is a 39 y.o. female.  She is about 6 weeks follow-up right ankle fracture.  Her pain is 4 out of 10.  It is better but still having some pain burning over the medial malleolus.  He is been in a boot.        Past Medical History:   Diagnosis Date   • ADHD (attention deficit hyperactivity disorder)    • Asthma    • Bipolar 1 disorder (CMS/HCC)    • Hemorrhage    • Herpes    • Hypertension    • Migraine    • Urinary tract infection       Past Surgical History:   Procedure Laterality Date   • CHOLECYSTECTOMY     • HYSTERECTOMY     • OOPHORECTOMY  2015   • ROTATOR CUFF REPAIR     • ULNAR NERVE REPAIR        Family History   Problem Relation Age of Onset   • Arthritis Mother    • Hypertension Mother    • Thyroid disease Mother    • Thyroid cancer Mother    • Heart disease Father    • Hypertension Father    • Hyperlipidemia Father    • Colon cancer Maternal Grandmother    • Breast cancer Paternal Grandmother    • Diabetes Paternal Grandmother      Social History     Socioeconomic History   • Marital status:      Spouse name: Not on file   • Number of children: Not on file   • Years of education: Not on file   • Highest education level: Not on file   Social Needs   • Financial resource strain: Not on file   • Food insecurity - worry: Not on file   • Food insecurity - inability: Not on file   • Transportation needs - medical: Not on file   • Transportation needs - non-medical: Not on file   Occupational History   • Not on file   Tobacco Use   • Smoking status: Light Tobacco Smoker     Types: Cigarettes     Start date: 1998   • Smokeless tobacco: Never Used   • Tobacco comment: SOCIAL SMOKER   Substance and Sexual Activity   • Alcohol use: No   • Drug use: No   • Sexual activity: Defer   Other Topics Concern   • Not on file   Social History  Narrative   • Not on file      Current Outpatient Medications on File Prior to Visit   Medication Sig Dispense Refill   • acyclovir (ZOVIRAX) 5 % ointment Apply 1 application topically to the appropriate area as directed Every 4 (Four) Hours. 30 g 2   • cyclobenzaprine (FLEXERIL) 10 MG tablet Take 1 tablet by mouth 3 (Three) Times a Day As Needed for Muscle Spasms. 12 tablet 0   • famotidine (PEPCID) 40 MG tablet TAKE 1 TABLET BY MOUTH DAILY 90 tablet 1   • fluticasone (FLONASE) 50 MCG/ACT nasal spray 2 sprays into each nostril Daily. (Patient taking differently: 2 sprays into each nostril Daily As Needed.) 1 bottle 0   • hydrochlorothiazide (HYDRODIURIL) 50 MG tablet Take 1 tablet by mouth Daily. 30 tablet 5   • hydrOXYzine (ATARAX) 25 MG tablet Take 1 tablet by mouth Every 8 (Eight) Hours As Needed for Itching. 20 tablet 0   • hydrOXYzine (ATARAX) 25 MG tablet Take 1 tablet by mouth 3 (Three) Times a Day As Needed for Itching. Will Cause Drowsiness. 50 tablet 1   • losartan (COZAAR) 100 MG tablet TAKE 1 TABLET BY MOUTH DAILY 90 tablet 1   • naproxen (EC NAPROSYN) 500 MG EC tablet Take 1 tablet by mouth 2 (Two) Times a Day As Needed for Mild Pain . 10 tablet 0   • omeprazole (priLOSEC) 40 MG capsule Take 1 capsule by mouth Daily. 25 capsule 0   • ondansetron (ZOFRAN) 4 MG tablet Take 1 tablet by mouth Every 8 (Eight) Hours As Needed for Nausea. 15 tablet 0   • PROAIR  (90 Base) MCG/ACT inhaler Inhale 2 puffs Every 6 (Six) Hours As Needed.     • promethazine (PHENERGAN) 25 MG suppository Insert 1 suppository into the rectum Every 6 (Six) Hours As Needed for Nausea or Vomiting. 9 suppository 0   • promethazine (PHENERGAN) 25 MG tablet Take 1 tablet by mouth Every 6 (Six) Hours As Needed for Nausea or Vomiting. 3 tablet 0   • promethazine (PHENERGAN) 25 MG tablet Take 1 tablet by mouth Every 6 (Six) Hours As Needed for Nausea or Vomiting. 21 tablet 0   • propranolol (INDERAL) 20 MG tablet Take 1 tablet by mouth  "2 (Two) Times a Day. 60 tablet 2   • QUEtiapine (SEROquel) 100 MG tablet Take 100 mg by mouth 2 (Two) Times a Day.     • QUEtiapine (SEROquel) 25 MG tablet TK 1 T PO BID  1   • sucralfate (CARAFATE) 1 GM/10ML suspension Take 10 mL by mouth 4 (Four) Times a Day With Meals & at Bedtime. 420 mL 0   • topiramate (TOPAMAX) 50 MG tablet Take 50 mg by mouth 2 (Two) Times a Day.     • valACYclovir (VALTREX) 1000 MG tablet Take 1 tablet by mouth 2 (Two) Times a Day. 20 tablet 2   • ZENZEDI 30 MG tablet Take 1 tablet by mouth 2 (Two) Times a Day.  0   • ziprasidone (GEODON) 40 MG capsule Take 1 capsule by mouth 2 (Two) Times a Day.  5     No current facility-administered medications on file prior to visit.       Allergies   Allergen Reactions   • Lamictal [Lamotrigine] Anaphylaxis   • Amlodipine Irritability   • Rocephin [Ceftriaxone] Hives   • Tetracyclines & Related Other (See Comments)     Low h&h        The following portions of the patient's history were reviewed and updated as appropriate: allergies, current medications, past family history, past medical history, past social history, past surgical history and problem list.    Review of Systems   Constitutional: Negative.    HENT: Negative.    Eyes: Negative.    Respiratory: Negative.    Cardiovascular: Negative.    Gastrointestinal: Negative.    Endocrine: Negative.    Genitourinary: Negative.    Musculoskeletal: Positive for arthralgias and joint swelling.   Skin: Negative.    Allergic/Immunologic: Negative.    Neurological: Negative.    Hematological: Negative.    Psychiatric/Behavioral: Negative.         Objective      Physical Exam  Pulse 80   Ht 177.8 cm (70\")   Wt 86.6 kg (190 lb 14.7 oz)   LMP  (LMP Unknown) Comment: NO PAP SINCE HYSTERECTOMY  SpO2 98%   BMI 27.39 kg/m²     Body mass index is 27.39 kg/m².        GENERAL APPEARANCE: awake, alert & oriented x 3, in no acute distress and well developed, well nourished  PSYCH: normal mood and affect    Ortho " Exam  Peripheral Vascular:  Lower Extremity:  Inspection:  Right--rapid capillary refill  Palpation:  Dorsalis pedis pulse:   Right--normal    Neurologic  Sensory:    Light Touch:     Right foot:  Dorsal intact and plantar intact       Overall Assessment of Muscle Strength and Tone:  Lower Extremities:     Right:  Tibialis anterior--5/5    Gastroc soleus--5/5    EHL--5/5    FHL--5/5      Musculoskeletal  Lower Extremity  Ankle/Foot:  Inspection and Palpation:     Right:  Tenderness: Medial malleolus    Swelling:none    Effusion:  None    Crepitus:  None       ROM:   Right:  Plantarflexion--50    Dorsiflexion--20    Inversion--10    Eversion--10        Instability:     Right:  Anterior drawer test--negative    Squeeze test--negative    Talar tilt test--negative        Imaging/Studies  Imaging Results (last 7 days)     Procedure Component Value Units Date/Time    XR Ankle 3+ View Right [[id number]] Updated:  02/06/19 0946          Assessment/Plan        ICD-10-CM ICD-9-CM   1. Fracture follow-up Z09 V67.4   2. Closed fracture of right ankle with routine healing, subsequent encounter S82.891D V54.19       Orders Placed This Encounter   Procedures   • XR Ankle 3+ View Right   • Ambulatory Referral to Physical Therapy    She will come out of the boot and transition to an ankle brace.  We will start physical therapy.  I'll see her back in 6 weeks.    Medical Decision Making  Management Options : over-the-counter medicine and physical/occupational therapy  Data/Risk: radiology tests and independent visualization of imaging, lab tests, or EMG/NCV    Jon Cheatham MD  02/06/19  9:53 AM         EMR Dragon/Transcription disclaimer:  Much of this encounter note is an electronic transcription of spoken language to printed text. Electronic transcription of spoken language may permit erroneous, or at times, nonsensical words or phrases to be inadvertently transcribed. Although I have reviewed the note for such errors, some may  still exist.

## 2019-03-20 ENCOUNTER — TELEPHONE (OUTPATIENT)
Dept: ORTHOPEDIC SURGERY | Facility: CLINIC | Age: 39
End: 2019-03-20

## 2019-04-15 ENCOUNTER — APPOINTMENT (OUTPATIENT)
Dept: CARDIOLOGY | Facility: HOSPITAL | Age: 39
End: 2019-04-15

## 2019-04-15 ENCOUNTER — HOSPITAL ENCOUNTER (EMERGENCY)
Facility: HOSPITAL | Age: 39
Discharge: HOME | End: 2019-04-15
Attending: EMERGENCY MEDICINE

## 2019-04-15 ENCOUNTER — APPOINTMENT (OUTPATIENT)
Dept: GENERAL RADIOLOGY | Facility: HOSPITAL | Age: 39
End: 2019-04-15

## 2019-04-15 ENCOUNTER — TELEPHONE (OUTPATIENT)
Dept: ORTHOPEDIC SURGERY | Facility: CLINIC | Age: 39
End: 2019-04-15

## 2019-04-15 VITALS
SYSTOLIC BLOOD PRESSURE: 189 MMHG | HEIGHT: 70 IN | RESPIRATION RATE: 16 BRPM | DIASTOLIC BLOOD PRESSURE: 105 MMHG | TEMPERATURE: 97.8 F | BODY MASS INDEX: 25.77 KG/M2 | HEART RATE: 99 BPM | OXYGEN SATURATION: 98 % | WEIGHT: 180 LBS

## 2019-04-15 DIAGNOSIS — M25.571 RIGHT ANKLE PAIN, UNSPECIFIED CHRONICITY: Primary | ICD-10-CM

## 2019-04-15 LAB
BH CV ECHO MEAS - BSA(HAYCOCK): 2 M^2
BH CV ECHO MEAS - BSA: 2 M^2
BH CV ECHO MEAS - BZI_BMI: 25.8 KILOGRAMS/M^2
BH CV ECHO MEAS - BZI_METRIC_HEIGHT: 177.8 CM
BH CV ECHO MEAS - BZI_METRIC_WEIGHT: 81.6 KG
BH CV LOWER VASCULAR LEFT COMMON FEMORAL AUGMENT: NORMAL
BH CV LOWER VASCULAR LEFT COMMON FEMORAL COMPRESS: NORMAL
BH CV LOWER VASCULAR LEFT COMMON FEMORAL PHASIC: NORMAL
BH CV LOWER VASCULAR LEFT COMMON FEMORAL SPONT: NORMAL
BH CV LOWER VASCULAR RIGHT COMMON FEMORAL AUGMENT: NORMAL
BH CV LOWER VASCULAR RIGHT COMMON FEMORAL COMPRESS: NORMAL
BH CV LOWER VASCULAR RIGHT COMMON FEMORAL PHASIC: NORMAL
BH CV LOWER VASCULAR RIGHT COMMON FEMORAL SPONT: NORMAL
BH CV LOWER VASCULAR RIGHT DISTAL FEMORAL COMPRESS: NORMAL
BH CV LOWER VASCULAR RIGHT GASTRONEMIUS COMPRESS: NORMAL
BH CV LOWER VASCULAR RIGHT GREATER SAPH BK COMPRESS: NORMAL
BH CV LOWER VASCULAR RIGHT LESSER SAPH COMPRESS: NORMAL
BH CV LOWER VASCULAR RIGHT MID FEMORAL AUGMENT: NORMAL
BH CV LOWER VASCULAR RIGHT MID FEMORAL COMPRESS: NORMAL
BH CV LOWER VASCULAR RIGHT MID FEMORAL PHASIC: NORMAL
BH CV LOWER VASCULAR RIGHT MID FEMORAL SPONT: NORMAL
BH CV LOWER VASCULAR RIGHT PERONEAL COMPRESS: NORMAL
BH CV LOWER VASCULAR RIGHT POPLITEAL AUGMENT: NORMAL
BH CV LOWER VASCULAR RIGHT POPLITEAL COMPRESS: NORMAL
BH CV LOWER VASCULAR RIGHT POPLITEAL PHASIC: NORMAL
BH CV LOWER VASCULAR RIGHT POPLITEAL SPONT: NORMAL
BH CV LOWER VASCULAR RIGHT POSTERIOR TIBIAL COMPRESS: NORMAL
BH CV LOWER VASCULAR RIGHT PROFUNDA FEMORAL COMPRESS: NORMAL
BH CV LOWER VASCULAR RIGHT PROXIMAL FEMORAL COMPRESS: NORMAL
BH CV LOWER VASCULAR RIGHT SAPHENOFEMORAL JUNCTION AUGMENT: NORMAL
BH CV LOWER VASCULAR RIGHT SAPHENOFEMORAL JUNCTION COMPRESS: NORMAL
BH CV LOWER VASCULAR RIGHT SAPHENOFEMORAL JUNCTION PHASIC: NORMAL
BH CV LOWER VASCULAR RIGHT SAPHENOFEMORAL JUNCTION SPONT: NORMAL

## 2019-04-15 PROCEDURE — 93971 EXTREMITY STUDY: CPT

## 2019-04-15 PROCEDURE — 93971 EXTREMITY STUDY: CPT | Performed by: INTERNAL MEDICINE

## 2019-04-15 PROCEDURE — 73610 X-RAY EXAM OF ANKLE: CPT

## 2019-04-15 PROCEDURE — 99284 EMERGENCY DEPT VISIT MOD MDM: CPT

## 2019-04-15 RX ORDER — HYDROCODONE BITARTRATE AND ACETAMINOPHEN 5; 325 MG/1; MG/1
1 TABLET ORAL ONCE
Status: COMPLETED | OUTPATIENT
Start: 2019-04-15 | End: 2019-04-15

## 2019-04-15 RX ADMIN — HYDROCODONE BITARTRATE AND ACETAMINOPHEN 1 TABLET: 5; 325 TABLET ORAL at 15:39

## 2019-04-15 NOTE — TELEPHONE ENCOUNTER
Patient said she is having a lot of ankle pain, like it feels like pins and needles, and she is having a hard time with the pain.

## 2019-04-22 ENCOUNTER — OFFICE VISIT (OUTPATIENT)
Dept: ORTHOPEDIC SURGERY | Facility: CLINIC | Age: 39
End: 2019-04-22

## 2019-04-22 VITALS — HEIGHT: 70 IN | HEART RATE: 86 BPM | WEIGHT: 203.04 LBS | BODY MASS INDEX: 29.07 KG/M2 | OXYGEN SATURATION: 97 %

## 2019-04-22 DIAGNOSIS — M25.571 PAIN AND SWELLING OF ANKLE, RIGHT: Primary | ICD-10-CM

## 2019-04-22 DIAGNOSIS — M25.471 PAIN AND SWELLING OF ANKLE, RIGHT: Primary | ICD-10-CM

## 2019-04-22 PROCEDURE — 99214 OFFICE O/P EST MOD 30 MIN: CPT | Performed by: PHYSICIAN ASSISTANT

## 2019-04-22 NOTE — PROGRESS NOTES
"    [facility] [place] [facility] Note    Subjective     CC: Follow-up of the Right Ankle (Closed Fracture of Right Ankle )      HPI    Bebe Rincon is a 39 y.o. female.  Patient returns for follow-up evaluation of her right ankle.  She had previously seen Dr. Cheatham in January and February for a medial malleolar fracture.  She was treated in a boot followed by ankle brace.  She reports that on 4/15/2019 she return to the ED complaining of increased ankle pain and significant swelling.  Patient states there is pain to both medial and lateral aspects of the ankle as well as anteriorly.  At this time she rates her pain 4-5/10.  Severity the pain moderate.  Quality pain aching, stabbing, shooting.  Associated symptoms swelling.  Symptoms are worse with walking, stair climbing.  She works as a / so she is on her feet a lot which also exacerbates her pain.  She describes a pins and needle sensation when weightbearing throughout the ankle.      ROS:    Constiutional:Pt denies fever, chills, nausea, or vomiting.  MSK:as above    Objective      Past Medical History  Past Medical History:   Diagnosis Date   • ADHD (attention deficit hyperactivity disorder)    • Asthma    • Bipolar 1 disorder (CMS/HCC)    • Hemorrhage    • Herpes    • Hypertension    • Migraine    • Urinary tract infection          Physical Exam  Pulse 86   Ht 177.8 cm (70\")   Wt 92.1 kg (203 lb 0.7 oz)   LMP  (LMP Unknown) Comment: NO PAP SINCE HYSTERECTOMY  SpO2 97%   Breastfeeding? No   BMI 29.13 kg/m²     Body mass index is 29.13 kg/m².    Patient is well nourished and well developed.        Ortho Exam  Positive antalgic gait  Peripheral Vascular:  Lower Extremity:  Inspection:  Right--rapid capillary refill  Palpation:  Dorsalis pedis pulse:   Right--normal    Neurologic  Sensory:    Light Touch:     Right foot:  Grossly intact to light touch to deep peroneal, superficial peroneal, saphenous, sural, tibial nerve " distributions.     Overall Assessment of Muscle Strength and Tone:  Lower Extremities:     Right:  Tibialis anterior--5/5    Gastroc soleus--5/5    EHL--5/5    FHL--5/5    Musculoskeletal  Lower Extremity  Ankle/Foot:  Inspection and Palpation:     Right:  Tenderness: Positive tenderness throughout medial and lateral aspect of the ankle as well as tenderness noted anteriorly.    Swelling: None today.  Patient did show me a picture from when she went to the urgent care with moderate soft tissue swelling noted to the ankle.    Effusion:  None    Crepitus:  None     ROM: Positive pain with any type of range of motion.   Right:  Plantarflexion--30    Dorsiflexion--10    Inversion--5    Eversion--5    Instability:     Right:  Anterior drawer test--negative    Squeeze test--negative    Talar tilt test--negative    Negative proximal tib-fib tenderness.  Patient unable to heel and toe walk secondary to pain.      Imaging/Labs/EMG Reviewed:    Imaging Results (last 24 hours)     ** No results found for the last 24 hours. **      Ordered plain film imaging of the right ankle.  No acute bony abnormality, fracture or dislocation.  Joint spaces are well-preserved.  Mortise is well aligned with no evidence of syndesmotic widening.  See chart for official report.    Assessment:  1. Pain and swelling of ankle, right        Plan:  1. Patient with right ankle pain and swelling.  She had a history of a medial malleolar fracture.  No evidence of fracture on exam imaging today.  2. Due to the fact that she is having persistent pain and swelling to the ankle proceed on with MRI for further evaluation.  3. For now continue wearing the boot until after she returns for follow-up evaluation and review of the MRI.  4. Patient to continue use of Advil and Tylenol as needed for inflammation/pain control.  5. Discussed elevation and icing also help assist with inflammation and pain control.  6. Follow-up after MRI completed to discuss results  and treatment options.  7. Questions and concerns answered.      Medical Decision Making  Management Options : over-the-counter medicine  Data/Risk: radiology tests       Patricia Morales PA-C  04/22/19  11:41 AM

## 2019-05-06 ENCOUNTER — TELEPHONE (OUTPATIENT)
Dept: ORTHOPEDIC SURGERY | Facility: CLINIC | Age: 39
End: 2019-05-06

## 2019-05-06 NOTE — TELEPHONE ENCOUNTER
CALLED PATIENT TO REMIND HER OF MISSED APPT TODAY. LEFT VOICEMAIL FOR HER TO CALL BACK AND RESCHEDULE. SENT NO SHOW LETTER.

## 2020-02-22 ENCOUNTER — HOSPITAL ENCOUNTER (EMERGENCY)
Facility: HOSPITAL | Age: 40
Discharge: HOME | End: 2020-02-23
Attending: EMERGENCY MEDICINE | Admitting: EMERGENCY MEDICINE

## 2020-02-22 ENCOUNTER — APPOINTMENT (OUTPATIENT)
Dept: GENERAL RADIOLOGY | Facility: HOSPITAL | Age: 40
End: 2020-02-22

## 2020-02-22 VITALS
RESPIRATION RATE: 18 BRPM | SYSTOLIC BLOOD PRESSURE: 185 MMHG | WEIGHT: 172 LBS | BODY MASS INDEX: 24.62 KG/M2 | DIASTOLIC BLOOD PRESSURE: 115 MMHG | HEART RATE: 102 BPM | OXYGEN SATURATION: 100 % | HEIGHT: 70 IN | TEMPERATURE: 97.8 F

## 2020-02-22 DIAGNOSIS — S49.91XA INJURY OF RIGHT SHOULDER, INITIAL ENCOUNTER: Primary | ICD-10-CM

## 2020-02-22 DIAGNOSIS — Z72.0 TOBACCO ABUSE: ICD-10-CM

## 2020-02-22 DIAGNOSIS — I10 ELEVATED BLOOD PRESSURE READING WITH DIAGNOSIS OF HYPERTENSION: ICD-10-CM

## 2020-02-22 DIAGNOSIS — Z87.39 HISTORY OF ROTATOR CUFF TEAR: ICD-10-CM

## 2020-02-22 PROCEDURE — 63710000001 ONDANSETRON ODT 4 MG TABLET DISPERSIBLE: Performed by: EMERGENCY MEDICINE

## 2020-02-22 PROCEDURE — 99283 EMERGENCY DEPT VISIT LOW MDM: CPT

## 2020-02-22 PROCEDURE — 73030 X-RAY EXAM OF SHOULDER: CPT

## 2020-02-22 RX ORDER — ONDANSETRON 4 MG/1
4 TABLET, ORALLY DISINTEGRATING ORAL ONCE
Status: COMPLETED | OUTPATIENT
Start: 2020-02-22 | End: 2020-02-22

## 2020-02-22 RX ORDER — HYDROCODONE BITARTRATE AND ACETAMINOPHEN 7.5; 325 MG/1; MG/1
1 TABLET ORAL ONCE
Status: COMPLETED | OUTPATIENT
Start: 2020-02-22 | End: 2020-02-22

## 2020-02-22 RX ADMIN — HYDROCODONE BITARTRATE AND ACETAMINOPHEN 1 TABLET: 7.5; 325 TABLET ORAL at 23:49

## 2020-02-22 RX ADMIN — ONDANSETRON 4 MG: 4 TABLET, ORALLY DISINTEGRATING ORAL at 23:49

## 2020-02-23 RX ORDER — HYDROCODONE BITARTRATE AND ACETAMINOPHEN 5; 325 MG/1; MG/1
1 TABLET ORAL EVERY 4 HOURS PRN
Qty: 12 TABLET | Refills: 0 | OUTPATIENT
Start: 2020-02-23 | End: 2020-11-25

## 2020-02-23 RX ORDER — ONDANSETRON 4 MG/1
4 TABLET, ORALLY DISINTEGRATING ORAL EVERY 4 HOURS
Qty: 12 TABLET | Refills: 0 | OUTPATIENT
Start: 2020-02-23 | End: 2020-11-25

## 2020-02-23 NOTE — ED PROVIDER NOTES
"Subjective   Bebe Rincon is a 40 y.o. female who presents to the ED status post a shoulder injury. She reports that she is a  and that she was moving a 250 lb keg with a coworker when her coworker dropped it. She took on most of the weight which caused her right shoulder to \"pop out and then pop back in\". This episode happened today at about 1930 and she reports experiencing a severe pain that shoots down into her right arm. She denies numbness or tingling. She has taken tylenol but it did not help. She reports that she had a shoulder surgery a few years ago on her right shoulder due to a rotator cuff tear and labrum tear by Dr. John Pierre. She had a hysterectomy 12 years ago. There are no other acute complaints at this time.      History provided by:  Patient  Shoulder Injury   Location:  Right shoulder  Severity:  Moderate  Onset quality:  Sudden  Duration:  5 hours  Timing:  Sporadic  Progression:  Unchanged  Chronicity:  New  Context:  Lifting heavy object  Relieved by:  Nothing  Worsened by:  Range of motion      Review of Systems   Musculoskeletal: Positive for arthralgias (Right shoulder injury with history of labrum and rotator cuff tear.). Negative for joint swelling.   Skin: Negative for color change and wound.   Neurological: Negative for numbness.   All other systems reviewed and are negative.      Past Medical History:   Diagnosis Date   • ADHD (attention deficit hyperactivity disorder)    • Asthma    • Bipolar 1 disorder (CMS/[facility])    • Hemorrhage    • Herpes    • Hypertension    • Migraine    • Urinary tract infection        Allergies   Allergen Reactions   • Lamictal [Lamotrigine] Anaphylaxis   • Amlodipine Irritability   • Rocephin [Ceftriaxone] Hives   • Tetracyclines & Related Other (See Comments)     Low h&h       Past Surgical History:   Procedure Laterality Date   • CHOLECYSTECTOMY     • HYSTERECTOMY     • OOPHORECTOMY  2015   • ROTATOR CUFF REPAIR     • ULNAR NERVE REPAIR   "       Family History   Problem Relation Age of Onset   • Arthritis Mother    • Hypertension Mother    • Thyroid disease Mother    • Thyroid cancer Mother    • Heart disease Father    • Hypertension Father    • Hyperlipidemia Father    • Colon cancer Maternal Grandmother    • Breast cancer Paternal Grandmother    • Diabetes Paternal Grandmother        Social History     Socioeconomic History   • Marital status:      Spouse name: Not on file   • Number of children: Not on file   • Years of education: Not on file   • Highest education level: Not on file   Tobacco Use   • Smoking status: Light Tobacco Smoker     Types: Cigarettes     Start date: 1998   • Smokeless tobacco: Never Used   • Tobacco comment: SOCIAL SMOKER   Substance and Sexual Activity   • Alcohol use: No   • Drug use: No   • Sexual activity: Defer         Objective   Physical Exam   Constitutional: She is oriented to person, place, and time. She appears well-developed and well-nourished. No distress.   HENT:   Head: Normocephalic and atraumatic.   Nose: Nose normal.   Eyes: Conjunctivae are normal. No scleral icterus.   Neck: Normal range of motion. Neck supple.   Cardiovascular: Normal rate and regular rhythm.   Pulses:       Radial pulses are 2+ on the right side, and 2+ on the left side.   Pulmonary/Chest: Effort normal. No respiratory distress.   Abdominal: Soft. There is no tenderness.   Musculoskeletal: She exhibits tenderness.        Right shoulder: She exhibits decreased range of motion and tenderness. She exhibits no swelling, no deformity and normal strength.   Tenderness to palpation to the right anterior shoulder. Limited abduction. Crepitus noted with range of motion. No deformity or AC drop-off. Equal  strength.   Neurological: She is alert and oriented to person, place, and time.   Skin: Skin is warm and dry.   Psychiatric: She has a normal mood and affect. Her behavior is normal.   Nursing note and vitals  "reviewed.      Procedures         ED Course  ED Course as of Feb 23 0437   Sun Feb 23, 2020   0029 JOCELYNN request number: [id number]     JOCELYNN query complete. Treatment plan to include limited course of prescribed  controlled substance. Risks including addiction, benefits, and alternatives presented to patient.     [AA]   0032 X-rays of the right shoulder show no acute bony abnormality.  With history of labrum and rotator cuff tear, we will apply a sling and recommend close orthopedic evaluation with Dr. Gaitan. Recommend MRI of the right shoulder.  Recommend patient to remove sling at least 4 times a day for range of motion.  We will prescribe Norco 5 mg Disp: #12 NR and Zofran on discharge.    [FC]      ED Course User Index  [AA] Arya Lopez  [FC] Juliet Roth PA-C          No results found for this or any previous visit (from the past 24 hour(s)).  Note: In addition to lab results from this visit, the labs listed above may include labs taken at another facility or during a different encounter within the last 24 hours. Please correlate lab times with ED admission and discharge times for further clarification of the services performed during this visit.    XR Shoulder 2+ View Right   Final Result   Negative right shoulder.      Signer Name: Mitch Villaseñor MD    Signed: 2/22/2020 11:44 PM    Workstation Name: [email or web address]     [facility]        Vitals:    02/22/20 2300 02/22/20 2305   BP: (!) 185/115    BP Location: Left arm    Patient Position: Sitting    Pulse: 102    Resp: 18    Temp: 97.8 °F (36.6 °C)    TempSrc: Oral    SpO2: 100%    Weight:  78 kg (172 lb)   Height:  177.8 cm (70\")     Medications   HYDROcodone-acetaminophen (NORCO) 7.5-325 MG per tablet 1 tablet (1 tablet Oral Given 2/22/20 2349)   ondansetron ODT (ZOFRAN-ODT) disintegrating tablet 4 mg (4 mg Oral Given 2/22/20 2349)     ECG/EMG Results (last 24 hours)     ** No results found for the last 24 hours. **        No " orders to display                               JOCELYNN query complete. Treatment plan to include limited course of prescribed  controlled substance. Risks including addiction, benefits, and alternatives presented to patient.     MDM    Final diagnoses:   Injury of right shoulder, initial encounter   History of rotator cuff tear   Elevated blood pressure reading with diagnosis of hypertension   Tobacco abuse       Documentation assistance provided by swetha Lopez.  Information recorded by the scribe was done at my direction and has been verified and validated by me.     Arya Lopez  02/23/20 0036       Juliet Roth PA-C  02/23/20 0437

## 2020-02-23 NOTE — DISCHARGE INSTRUCTIONS
X-rays of the right shoulder show no acute bony abnormality.  With history of a rotator cuff tear and labrum tear, we will apply a right upper extremity sling.  Recommend patient to remove it at least 4 times daily for range of motion.  Close orthopedic follow-up for recheck and MRI to rule out soft tissue injury.  Rx for Norco 5 mg dispense 12 no refills and Zofran 4 mg ODT as directed.  Return to the ER if worsening symptoms.  Continue with all other current medical management.

## 2020-10-17 ENCOUNTER — APPOINTMENT (OUTPATIENT)
Dept: CT IMAGING | Facility: HOSPITAL | Age: 40
End: 2020-10-17

## 2020-10-17 ENCOUNTER — HOSPITAL ENCOUNTER (EMERGENCY)
Facility: HOSPITAL | Age: 40
Discharge: HOME | End: 2020-10-18
Attending: EMERGENCY MEDICINE | Admitting: EMERGENCY MEDICINE

## 2020-10-17 DIAGNOSIS — Z86.59 HISTORY OF ADHD: ICD-10-CM

## 2020-10-17 DIAGNOSIS — S00.83XA CONTUSION OF CHIN, INITIAL ENCOUNTER: ICD-10-CM

## 2020-10-17 DIAGNOSIS — H05.232 PERIORBITAL HEMATOMA OF LEFT EYE: ICD-10-CM
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
## 2020-10-17 DIAGNOSIS — S09.90XA TRAUMATIC INJURY OF HEAD, INITIAL ENCOUNTER: ICD-10-CM

## 2020-10-17 DIAGNOSIS — Z86.59 HISTORY OF BIPOLAR DISORDER: ICD-10-CM

## 2020-10-17 DIAGNOSIS — S16.1XXA STRAIN OF NECK MUSCLE, INITIAL ENCOUNTER: ICD-10-CM

## 2020-10-17 DIAGNOSIS — Z86.79 HISTORY OF HYPERTENSION: ICD-10-CM

## 2020-10-17 PROCEDURE — 99283 EMERGENCY DEPT VISIT LOW MDM: CPT

## 2020-10-17 PROCEDURE — 72125 CT NECK SPINE W/O DYE: CPT

## 2020-10-17 PROCEDURE — 70450 CT HEAD/BRAIN W/O DYE: CPT

## 2020-10-17 PROCEDURE — 70486 CT MAXILLOFACIAL W/O DYE: CPT

## 2020-10-17 RX ORDER — KETOROLAC TROMETHAMINE 30 MG/ML
30 INJECTION, SOLUTION INTRAMUSCULAR; INTRAVENOUS ONCE
Status: COMPLETED | OUTPATIENT
Start: 2020-10-17 | End: 2020-10-18

## 2020-10-17 RX ORDER — METOCLOPRAMIDE HYDROCHLORIDE 5 MG/ML
10 INJECTION INTRAMUSCULAR; INTRAVENOUS ONCE
Status: COMPLETED | OUTPATIENT
Start: 2020-10-17 | End: 2020-10-18

## 2020-10-17 RX ORDER — DIPHENHYDRAMINE HYDROCHLORIDE 50 MG/ML
25 INJECTION INTRAMUSCULAR; INTRAVENOUS ONCE
Status: COMPLETED | OUTPATIENT
Start: 2020-10-17 | End: 2020-10-18

## 2020-10-17 RX ORDER — ONDANSETRON 2 MG/ML
4 INJECTION INTRAMUSCULAR; INTRAVENOUS ONCE
Status: COMPLETED | OUTPATIENT
Start: 2020-10-17 | End: 2020-10-18

## 2020-10-17 RX ORDER — DEXAMETHASONE SODIUM PHOSPHATE 4 MG/ML
8 INJECTION, SOLUTION INTRA-ARTICULAR; INTRALESIONAL; INTRAMUSCULAR; INTRAVENOUS; SOFT TISSUE ONCE
Status: COMPLETED | OUTPATIENT
Start: 2020-10-17 | End: 2020-10-18

## 2020-10-17 RX ORDER — CLONIDINE HYDROCHLORIDE 0.1 MG/1
0.1 TABLET ORAL ONCE
Status: COMPLETED | OUTPATIENT
Start: 2020-10-17 | End: 2020-10-17

## 2020-10-17 RX ADMIN — CLONIDINE HYDROCHLORIDE 0.1 MG: 0.1 TABLET ORAL at 23:37

## 2020-10-18 VITALS
OXYGEN SATURATION: 97 % | SYSTOLIC BLOOD PRESSURE: 153 MMHG | DIASTOLIC BLOOD PRESSURE: 92 MMHG | WEIGHT: 188 LBS | BODY MASS INDEX: 26.92 KG/M2 | TEMPERATURE: 98 F | HEIGHT: 70 IN | RESPIRATION RATE: 16 BRPM | HEART RATE: 73 BPM

## 2020-10-18 PROCEDURE — 25010000002 DIPHENHYDRAMINE PER 50 MG: Performed by: PHYSICIAN ASSISTANT

## 2020-10-18 PROCEDURE — 96374 THER/PROPH/DIAG INJ IV PUSH: CPT

## 2020-10-18 PROCEDURE — 25010000002 ONDANSETRON PER 1 MG: Performed by: PHYSICIAN ASSISTANT

## 2020-10-18 PROCEDURE — 96375 TX/PRO/DX INJ NEW DRUG ADDON: CPT

## 2020-10-18 PROCEDURE — 25010000002 METOCLOPRAMIDE PER 10 MG: Performed by: PHYSICIAN ASSISTANT

## 2020-10-18 PROCEDURE — 25010000002 KETOROLAC TROMETHAMINE PER 15 MG: Performed by: PHYSICIAN ASSISTANT

## 2020-10-18 PROCEDURE — 25010000002 DEXAMETHASONE PER 1 MG: Performed by: PHYSICIAN ASSISTANT

## 2020-10-18 RX ADMIN — METOCLOPRAMIDE 10 MG: 5 INJECTION, SOLUTION INTRAMUSCULAR; INTRAVENOUS at 01:25

## 2020-10-18 RX ADMIN — ONDANSETRON 4 MG: 2 INJECTION INTRAMUSCULAR; INTRAVENOUS at 01:25

## 2020-10-18 RX ADMIN — DEXAMETHASONE SODIUM PHOSPHATE 8 MG: 4 INJECTION, SOLUTION INTRAMUSCULAR; INTRAVENOUS at 01:25

## 2020-10-18 RX ADMIN — KETOROLAC TROMETHAMINE 30 MG: 30 INJECTION, SOLUTION INTRAMUSCULAR; INTRAVENOUS at 01:26

## 2020-10-18 RX ADMIN — SODIUM CHLORIDE 1000 ML: 9 INJECTION, SOLUTION INTRAVENOUS at 01:26

## 2020-10-18 RX ADMIN — DIPHENHYDRAMINE HYDROCHLORIDE 25 MG: 50 INJECTION INTRAMUSCULAR; INTRAVENOUS at 01:26
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
## 2020-10-18 NOTE — DISCHARGE INSTRUCTIONS
ER evaluation reveals normal CT of the brain and cervical spine without contrast.  CT of the facial bones reveal a periorbital hematoma but no evidence of orbital or facial bone fracture.  We will give head injury instructions.  Patient more than likely has a mild concussion and she could have some symptoms of headache, dizziness, and trouble concentrating for the next couple of weeks due to head trauma.  Recommend close follow-up with PCP on Monday for recheck.  We will give patient sprain/strain instructions.  Continue with all other current medical management.  Return to the ER sooner if worsening symptoms.

## 2020-11-12 ENCOUNTER — APPOINTMENT (OUTPATIENT)
Dept: CT IMAGING | Facility: HOSPITAL | Age: 40
End: 2020-11-12

## 2020-11-12 ENCOUNTER — APPOINTMENT (OUTPATIENT)
Dept: GENERAL RADIOLOGY | Facility: HOSPITAL | Age: 40
End: 2020-11-12

## 2020-11-12 ENCOUNTER — HOSPITAL ENCOUNTER (EMERGENCY)
Facility: HOSPITAL | Age: 40
Discharge: HOME | End: 2020-11-12
Attending: EMERGENCY MEDICINE | Admitting: EMERGENCY MEDICINE

## 2020-11-12 VITALS
HEIGHT: 70 IN | OXYGEN SATURATION: 96 % | SYSTOLIC BLOOD PRESSURE: 165 MMHG | TEMPERATURE: 97.7 F | WEIGHT: 178 LBS | RESPIRATION RATE: 16 BRPM | HEART RATE: 60 BPM | DIASTOLIC BLOOD PRESSURE: 102 MMHG | BODY MASS INDEX: 25.48 KG/M2

## 2020-11-12 DIAGNOSIS — S09.90XA INJURY OF HEAD, INITIAL ENCOUNTER: Primary | ICD-10-CM

## 2020-11-12 DIAGNOSIS — S06.0X9A CONCUSSION WITH LOSS OF CONSCIOUSNESS, INITIAL ENCOUNTER: ICD-10-CM

## 2020-11-12 DIAGNOSIS — S50.02XA CONTUSION OF LEFT ELBOW, INITIAL ENCOUNTER: ICD-10-CM

## 2020-11-12 DIAGNOSIS — S16.1XXA STRAIN OF NECK MUSCLE, INITIAL ENCOUNTER: ICD-10-CM

## 2020-11-12 PROCEDURE — 70450 CT HEAD/BRAIN W/O DYE: CPT

## 2020-11-12 PROCEDURE — 73070 X-RAY EXAM OF ELBOW: CPT

## 2020-11-12 PROCEDURE — 99284 EMERGENCY DEPT VISIT MOD MDM: CPT

## 2020-11-12 PROCEDURE — 63710000001 ONDANSETRON PER 8 MG: Performed by: PHYSICIAN ASSISTANT

## 2020-11-12 PROCEDURE — 72125 CT NECK SPINE W/O DYE: CPT

## 2020-11-12 RX ORDER — ONDANSETRON 4 MG/1
4 TABLET, FILM COATED ORAL EVERY 6 HOURS PRN
Qty: 15 TABLET | Refills: 0 | OUTPATIENT
Start: 2020-11-12 | End: 2020-11-25

## 2020-11-12 RX ORDER — PROMETHAZINE HYDROCHLORIDE 25 MG/1
25 TABLET ORAL EVERY 6 HOURS PRN
Qty: 15 TABLET | Refills: 0 | OUTPATIENT
Start: 2020-11-12 | End: 2020-11-25

## 2020-11-12 RX ORDER — CYCLOBENZAPRINE HCL 10 MG
10 TABLET ORAL 3 TIMES DAILY PRN
Qty: 15 TABLET | Refills: 0 | OUTPATIENT
Start: 2020-11-12 | End: 2020-11-25

## 2020-11-12 RX ORDER — IBUPROFEN 800 MG/1
800 TABLET ORAL EVERY 8 HOURS PRN
Qty: 30 TABLET | Refills: 0 | Status: SHIPPED | OUTPATIENT
Start: 2020-11-12 | End: 2020-12-01

## 2020-11-12 RX ORDER — ONDANSETRON 4 MG/1
4 TABLET, FILM COATED ORAL ONCE
Status: COMPLETED | OUTPATIENT
Start: 2020-11-12 | End: 2020-11-12

## 2020-11-12 RX ADMIN — ONDANSETRON HYDROCHLORIDE 4 MG: 4 TABLET, FILM COATED ORAL at 13:55
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
## 2020-11-25 ENCOUNTER — APPOINTMENT (OUTPATIENT)
Dept: GENERAL RADIOLOGY | Facility: HOSPITAL | Age: 40
End: 2020-11-25

## 2020-11-25 ENCOUNTER — HOSPITAL ENCOUNTER (EMERGENCY)
Facility: HOSPITAL | Age: 40
Discharge: HOME | End: 2020-11-25
Attending: EMERGENCY MEDICINE | Admitting: EMERGENCY MEDICINE

## 2020-11-25 ENCOUNTER — APPOINTMENT (OUTPATIENT)
Dept: CT IMAGING | Facility: HOSPITAL | Age: 40
End: 2020-11-25

## 2020-11-25 VITALS
WEIGHT: 178 LBS | RESPIRATION RATE: 16 BRPM | TEMPERATURE: 98 F | HEART RATE: 71 BPM | DIASTOLIC BLOOD PRESSURE: 92 MMHG | BODY MASS INDEX: 25.48 KG/M2 | HEIGHT: 70 IN | OXYGEN SATURATION: 95 % | SYSTOLIC BLOOD PRESSURE: 129 MMHG

## 2020-11-25 DIAGNOSIS — G43.809 OTHER MIGRAINE WITHOUT STATUS MIGRAINOSUS, NOT INTRACTABLE: Primary | ICD-10-CM

## 2020-11-25 DIAGNOSIS — I10 HYPERTENSION, UNSPECIFIED TYPE: ICD-10-CM

## 2020-11-25 LAB
ALBUMIN SERPL-MCNC: 4.4 G/DL (ref 3.5–5.2)
ALBUMIN/GLOB SERPL: 1.6 G/DL
ALP SERPL-CCNC: 85 U/L (ref 39–117)
ALT SERPL W P-5'-P-CCNC: 21 U/L (ref 1–33)
ANION GAP SERPL CALCULATED.3IONS-SCNC: 12 MMOL/L (ref 5–15)
AST SERPL-CCNC: 19 U/L (ref 1–32)
BASOPHILS # BLD AUTO: 0.03 10*3/MM3 (ref 0–0.2)
BASOPHILS NFR BLD AUTO: 0.5 % (ref 0–1.5)
BILIRUB SERPL-MCNC: 0.3 MG/DL (ref 0–1.2)
BUN SERPL-MCNC: 11 MG/DL (ref 6–20)
BUN/CREAT SERPL: 11.1 (ref 7–25)
CALCIUM SPEC-SCNC: 10.4 MG/DL (ref 8.6–10.5)
CHLORIDE SERPL-SCNC: 99 MMOL/L (ref 98–107)
CO2 SERPL-SCNC: 27 MMOL/L (ref 22–29)
CREAT SERPL-MCNC: 0.99 MG/DL (ref 0.57–1)
DEPRECATED RDW RBC AUTO: 42.9 FL (ref 37–54)
EOSINOPHIL # BLD AUTO: 0.2 10*3/MM3 (ref 0–0.4)
EOSINOPHIL NFR BLD AUTO: 3.1 % (ref 0.3–6.2)
ERYTHROCYTE [DISTWIDTH] IN BLOOD BY AUTOMATED COUNT: 12.2 % (ref 12.3–15.4)
GFR SERPL CREATININE-BSD FRML MDRD: 62 ML/MIN/1.73
GLOBULIN UR ELPH-MCNC: 2.8 GM/DL
GLUCOSE SERPL-MCNC: 107 MG/DL (ref 65–99)
HCT VFR BLD AUTO: 43.6 % (ref 34–46.6)
HGB BLD-MCNC: 14.5 G/DL (ref 12–15.9)
HOLD SPECIMEN: NORMAL
HOLD SPECIMEN: NORMAL
IMM GRANULOCYTES # BLD AUTO: 0.01 10*3/MM3 (ref 0–0.05)
IMM GRANULOCYTES NFR BLD AUTO: 0.2 % (ref 0–0.5)
LYMPHOCYTES # BLD AUTO: 1.71 10*3/MM3 (ref 0.7–3.1)
LYMPHOCYTES NFR BLD AUTO: 26.6 % (ref 19.6–45.3)
MCH RBC QN AUTO: 31.6 PG (ref 26.6–33)
MCHC RBC AUTO-ENTMCNC: 33.3 G/DL (ref 31.5–35.7)
MCV RBC AUTO: 95 FL (ref 79–97)
MONOCYTES # BLD AUTO: 0.58 10*3/MM3 (ref 0.1–0.9)
MONOCYTES NFR BLD AUTO: 9 % (ref 5–12)
NEUTROPHILS NFR BLD AUTO: 3.89 10*3/MM3 (ref 1.7–7)
NEUTROPHILS NFR BLD AUTO: 60.6 % (ref 42.7–76)
NRBC BLD AUTO-RTO: 0 /100 WBC (ref 0–0.2)
NT-PROBNP SERPL-MCNC: 88.4 PG/ML (ref 0–450)
PLATELET # BLD AUTO: 337 10*3/MM3 (ref 140–450)
PMV BLD AUTO: 9.8 FL (ref 6–12)
POTASSIUM SERPL-SCNC: 4 MMOL/L (ref 3.5–5.2)
PROT SERPL-MCNC: 7.2 G/DL (ref 6–8.5)
QT INTERVAL: 340 MS
QTC INTERVAL: 462 MS
RBC # BLD AUTO: 4.59 10*6/MM3 (ref 3.77–5.28)
SODIUM SERPL-SCNC: 138 MMOL/L (ref 136–145)
TROPONIN T SERPL-MCNC: <0.01 NG/ML (ref 0–0.03)
WBC # BLD AUTO: 6.42 10*3/MM3 (ref 3.4–10.8)
WHOLE BLOOD HOLD SPECIMEN: NORMAL
WHOLE BLOOD HOLD SPECIMEN: NORMAL

## 2020-11-25 PROCEDURE — 83880 ASSAY OF NATRIURETIC PEPTIDE: CPT | Performed by: EMERGENCY MEDICINE

## 2020-11-25 PROCEDURE — 25010000002 ONDANSETRON PER 1 MG: Performed by: EMERGENCY MEDICINE

## 2020-11-25 PROCEDURE — 71045 X-RAY EXAM CHEST 1 VIEW: CPT

## 2020-11-25 PROCEDURE — 25010000002 DROPERIDOL PER 5 MG: Performed by: PHYSICIAN ASSISTANT

## 2020-11-25 PROCEDURE — 93005 ELECTROCARDIOGRAM TRACING: CPT | Performed by: EMERGENCY MEDICINE

## 2020-11-25 PROCEDURE — 80053 COMPREHEN METABOLIC PANEL: CPT | Performed by: EMERGENCY MEDICINE

## 2020-11-25 PROCEDURE — 25010000002 DIPHENHYDRAMINE PER 50 MG: Performed by: PHYSICIAN ASSISTANT

## 2020-11-25 PROCEDURE — 96365 THER/PROPH/DIAG IV INF INIT: CPT

## 2020-11-25 PROCEDURE — 84484 ASSAY OF TROPONIN QUANT: CPT | Performed by: EMERGENCY MEDICINE

## 2020-11-25 PROCEDURE — 85025 COMPLETE CBC W/AUTO DIFF WBC: CPT | Performed by: EMERGENCY MEDICINE

## 2020-11-25 PROCEDURE — 70450 CT HEAD/BRAIN W/O DYE: CPT

## 2020-11-25 PROCEDURE — 25010000002 HYDROMORPHONE PER 4 MG: Performed by: EMERGENCY MEDICINE

## 2020-11-25 PROCEDURE — 96375 TX/PRO/DX INJ NEW DRUG ADDON: CPT

## 2020-11-25 PROCEDURE — 25010000002 METOCLOPRAMIDE PER 10 MG: Performed by: PHYSICIAN ASSISTANT

## 2020-11-25 PROCEDURE — 93005 ELECTROCARDIOGRAM TRACING: CPT

## 2020-11-25 PROCEDURE — 99284 EMERGENCY DEPT VISIT MOD MDM: CPT

## 2020-11-25 PROCEDURE — 25010000002 KETOROLAC TROMETHAMINE PER 15 MG: Performed by: PHYSICIAN ASSISTANT

## 2020-11-25 RX ORDER — KETOROLAC TROMETHAMINE 30 MG/ML
30 INJECTION, SOLUTION INTRAMUSCULAR; INTRAVENOUS ONCE
Status: COMPLETED | OUTPATIENT
Start: 2020-11-25 | End: 2020-11-25

## 2020-11-25 RX ORDER — METOCLOPRAMIDE HYDROCHLORIDE 5 MG/ML
10 INJECTION INTRAMUSCULAR; INTRAVENOUS ONCE
Status: COMPLETED | OUTPATIENT
Start: 2020-11-25 | End: 2020-11-25

## 2020-11-25 RX ORDER — SODIUM CHLORIDE 0.9 % (FLUSH) 0.9 %
10 SYRINGE (ML) INJECTION AS NEEDED
Status: DISCONTINUED | OUTPATIENT
Start: 2020-11-25 | End: 2020-11-25 | Stop reason: HOSPADM

## 2020-11-25 RX ORDER — DIPHENHYDRAMINE HYDROCHLORIDE 50 MG/ML
25 INJECTION INTRAMUSCULAR; INTRAVENOUS ONCE
Status: COMPLETED | OUTPATIENT
Start: 2020-11-25 | End: 2020-11-25

## 2020-11-25 RX ORDER — ONDANSETRON 2 MG/ML
4 INJECTION INTRAMUSCULAR; INTRAVENOUS ONCE
Status: COMPLETED | OUTPATIENT
Start: 2020-11-25 | End: 2020-11-25

## 2020-11-25 RX ORDER — ALUMINA, MAGNESIA, AND SIMETHICONE 2400; 2400; 240 MG/30ML; MG/30ML; MG/30ML
15 SUSPENSION ORAL ONCE
Status: COMPLETED | OUTPATIENT
Start: 2020-11-25 | End: 2020-11-25

## 2020-11-25 RX ORDER — CLONIDINE HYDROCHLORIDE 0.1 MG/1
0.1 TABLET ORAL 2 TIMES DAILY
Qty: 20 TABLET | Refills: 0 | Status: SHIPPED | OUTPATIENT
Start: 2020-11-25 | End: 2020-12-13 | Stop reason: SDUPTHER

## 2020-11-25 RX ORDER — HYDROMORPHONE HYDROCHLORIDE 1 MG/ML
0.25 INJECTION, SOLUTION INTRAMUSCULAR; INTRAVENOUS; SUBCUTANEOUS ONCE
Status: COMPLETED | OUTPATIENT
Start: 2020-11-25 | End: 2020-11-25

## 2020-11-25 RX ORDER — LIDOCAINE HYDROCHLORIDE 20 MG/ML
15 SOLUTION OROPHARYNGEAL ONCE
Status: COMPLETED | OUTPATIENT
Start: 2020-11-25 | End: 2020-11-25

## 2020-11-25 RX ORDER — LABETALOL HYDROCHLORIDE 5 MG/ML
20 INJECTION, SOLUTION INTRAVENOUS ONCE
Status: COMPLETED | OUTPATIENT
Start: 2020-11-25 | End: 2020-11-25

## 2020-11-25 RX ORDER — DROPERIDOL 2.5 MG/ML
2.5 INJECTION, SOLUTION INTRAMUSCULAR; INTRAVENOUS ONCE
Status: COMPLETED | OUTPATIENT
Start: 2020-11-25 | End: 2020-11-25

## 2020-11-25 RX ADMIN — ALUMINUM HYDROXIDE, MAGNESIUM HYDROXIDE, AND DIMETHICONE 15 ML: 400; 400; 40 SUSPENSION ORAL at 12:55

## 2020-11-25 RX ADMIN — KETOROLAC TROMETHAMINE 30 MG: 30 INJECTION, SOLUTION INTRAMUSCULAR; INTRAVENOUS at 12:53

## 2020-11-25 RX ADMIN — METOCLOPRAMIDE 10 MG: 5 INJECTION, SOLUTION INTRAMUSCULAR; INTRAVENOUS at 13:07

## 2020-11-25 RX ADMIN — DROPERIDOL 2.5 MG: 2.5 INJECTION, SOLUTION INTRAMUSCULAR; INTRAVENOUS at 18:19

## 2020-11-25 RX ADMIN — LABETALOL 20 MG/4 ML (5 MG/ML) INTRAVENOUS SYRINGE 20 MG: at 11:53

## 2020-11-25 RX ADMIN — LIDOCAINE HYDROCHLORIDE 15 ML: 20 SOLUTION ORAL; TOPICAL at 12:55

## 2020-11-25 RX ADMIN — DIPHENHYDRAMINE HYDROCHLORIDE 25 MG: 50 INJECTION INTRAMUSCULAR; INTRAVENOUS at 12:52

## 2020-11-25 RX ADMIN — SODIUM CHLORIDE 500 MG: 9 INJECTION, SOLUTION INTRAVENOUS at 16:41

## 2020-11-25 RX ADMIN — HYDROMORPHONE HYDROCHLORIDE 0.25 MG: 1 INJECTION, SOLUTION INTRAMUSCULAR; INTRAVENOUS; SUBCUTANEOUS at 11:52

## 2020-11-25 RX ADMIN — ONDANSETRON 4 MG: 2 INJECTION INTRAMUSCULAR; INTRAVENOUS at 11:52

## 2020-12-01 ENCOUNTER — OFFICE VISIT (OUTPATIENT)
Dept: INTERNAL MEDICINE | Facility: CLINIC | Age: 40
End: 2020-12-01

## 2020-12-01 VITALS
BODY MASS INDEX: 32 KG/M2 | HEART RATE: 88 BPM | RESPIRATION RATE: 18 BRPM | WEIGHT: 223 LBS | TEMPERATURE: 98.2 F | SYSTOLIC BLOOD PRESSURE: 170 MMHG | DIASTOLIC BLOOD PRESSURE: 112 MMHG

## 2020-12-01 DIAGNOSIS — I10 ESSENTIAL HYPERTENSION: Primary | ICD-10-CM

## 2020-12-01 DIAGNOSIS — R07.9 CHEST PAIN, UNSPECIFIED TYPE: ICD-10-CM

## 2020-12-01 PROCEDURE — 84439 ASSAY OF FREE THYROXINE: CPT | Performed by: INTERNAL MEDICINE

## 2020-12-01 PROCEDURE — 84443 ASSAY THYROID STIM HORMONE: CPT | Performed by: INTERNAL MEDICINE

## 2020-12-01 PROCEDURE — 99214 OFFICE O/P EST MOD 30 MIN: CPT | Performed by: INTERNAL MEDICINE

## 2020-12-01 PROCEDURE — 36415 COLL VENOUS BLD VENIPUNCTURE: CPT | Performed by: INTERNAL MEDICINE

## 2020-12-01 NOTE — PROGRESS NOTES
Chief Complaint   Patient presents with   • ER follow up     see in [facility] ER on 11/25 for high blood pressure       History of Present Illness      The patient presents to the office for a follow-up visit from a recent emergency room visit. She was seen at the ER on 25-Nov-2020. She was diagnosed with hypertension. The records have been received and reviewed. The medication list has been reconciled. Her treatment consisted of adjustments in the patients medication regimen.       Since being seen in the emergency room she reports that she is improved. She denies abdominal pain, itchy watery eyes, bone pain, chills, congestion, a dry cough, a wet cough, decreased appetite, diarrhea, dysuria, ear drainage, ear pain, eye drainage, facial pain, fever, a headache, joint pain, myalgias, nasal discharge, nausea, neck stiffness, night sweats, a rash, sneezing, a sore throat, vomiting or wheezing. She also reports that she is having chest pain but she denies having dyspnea, edema, syncope, blurred vision or palpitations.    The patient presents for an emergency department follow-up of hypertension. She is following a heart healthy diet. She states that she is taking her medication as prescribed. She is not having medication side effects. She checks her blood pressures at home. The home readings are elevated.    The patient presents for an acute visit for intermittent chest pain. There is a one month history of chest pain. The episodes tend to last thirty minutes. The patient has noted no alleviating factors. The pain onset was sudden. The pain is made worse with activity and it is relieved with rest. The pain is not made worse with deep breaths. It is characterized as pressure and a squeezing sensation. The pain does not radiate. The patient states the pain is in the left chest and substernal. The pain is not associated with belching, clamminess, diaphoresis, dizziness, fatigue, fever, indigestion or lightheadedness. A past  history of CAD is not reported. Her father had a 5 vessel CABG at age 42.    Review of Systems     CONSTITUTIONAL- Denies Unexplained Weight Loss, Weakness or Malaise.    HENT- Denies Sinus Pain, Decreased Hearing or Tinnitus.    GASTROINTESTINAL- Denies Blood per Rectum, Constipation or Heartburn.    PULMONARY- Denies Sputum Production, Cough, Hemoptysis or Pleuritic Chest Pain.    CARDIOVASCULAR- Denies Claudication.    Medications      Current Outpatient Medications:   •  cloNIDine (CATAPRES) 0.1 MG tablet, Take 1 tablet by mouth 2 (Two) Times a Day., Disp: 20 tablet, Rfl: 0  •  PROAIR  (90 Base) MCG/ACT inhaler, Inhale 2 puffs Every 6 (Six) Hours As Needed., Disp: , Rfl:   •  valACYclovir (VALTREX) 1000 MG tablet, Take 1 tablet by mouth 2 (Two) Times a Day. (Patient taking differently: Take 1,000 mg by mouth 2 (Two) Times a Day As Needed.), Disp: 20 tablet, Rfl: 2  •  ziprasidone (GEODON) 40 MG capsule, Take 1 capsule by mouth 2 (Two) Times a Day., Disp: , Rfl: 5     Allergies    Allergies   Allergen Reactions   • Lamictal [Lamotrigine] Anaphylaxis   • Amlodipine Irritability   • Rocephin [Ceftriaxone] Hives   • Tetracyclines & Related Other (See Comments)     Low h&h       Problem List    Patient Active Problem List   Diagnosis   • Essential hypertension   • Esophageal dysphagia   • Atypical chest pain   • Gastroesophageal reflux disease without esophagitis   • Family history of colon cancer       Medications, Allergies, Problems List and Past History were reviewed and updated.    Physical Examination    BP (!) 170/112 (BP Location: Left arm, Patient Position: Sitting, Cuff Size: Adult)   Pulse 88   Temp 98.2 °F (36.8 °C) (Infrared)   Resp 18   Wt 101 kg (223 lb)   LMP  (LMP Unknown) Comment: NO PAP SINCE HYSTERECTOMY  Breastfeeding No   BMI 32.00 kg/m²     HEENT: Facies- Within normal limits. Lids- Normal bilaterally. Conjunctiva- Clear bilaterally. Sclera- Anicteric bilaterally.    Neck:  Thyroid- non enlarged, symmetric and has no nodules. No bruits are detected.    Lungs: Auscultation- Clear to auscultation bilaterally. There are no retractions, clubbing or cyanosis. The Expiratory to Inspiratory ratio is equal.    Cardiovascular: There are no carotid bruits. Heart- Normal Rate with Regular rhythm and no murmurs. There are no gallops. There are no rubs. In the lower extremities there is no edema. The upper extremities do not have edema.    Impression and Assessment    Essential Hypertension.    Chest Pain.    Plan    Essential Hypertension Plan: Medication was added as noted below.    Chest Pain Plan: She was instructed to take an 81 mg aspirin daily. The patient was instructed to avoid exercise or vigorous activity until after further evaluation. A medication was added as noted.    Diagnoses and all orders for this visit:    1. Essential hypertension (Primary)  -     TSH  -     T4, Free  -     metoprolol tartrate (LOPRESSOR) 25 MG tablet; Take 1 tablet by mouth 2 (Two) Times a Day.  Dispense: 60 tablet; Refill: 2    2. Chest pain, unspecified type  -     Stress Test With Myocardial Perfusion One Day; Future        Return to Office    The patient was instructed to return for follow-up in 2 weeks. Next Visit: Recheck Blood Pressure. The patient was instructed to return sooner if the condition changes, worsens, or does not resolve.

## 2020-12-02 LAB
T4 FREE SERPL-MCNC: 0.97 NG/DL (ref 0.93–1.7)
TSH SERPL DL<=0.05 MIU/L-ACNC: 2.66 UIU/ML (ref 0.27–4.2)

## 2020-12-13 ENCOUNTER — DOCUMENTATION (OUTPATIENT)
Dept: INTERNAL MEDICINE | Facility: CLINIC | Age: 40
End: 2020-12-13

## 2020-12-13 RX ORDER — CLONIDINE HYDROCHLORIDE 0.1 MG/1
0.1 TABLET ORAL 2 TIMES DAILY
Qty: 60 TABLET | Refills: 5 | Status: SHIPPED | OUTPATIENT
Start: 2020-12-13

## 2020-12-13 RX ORDER — LOSARTAN POTASSIUM 50 MG/1
50 TABLET ORAL DAILY
Qty: 30 TABLET | Refills: 5 | Status: SHIPPED | OUTPATIENT
Start: 2020-12-13 | End: 2020-12-21 | Stop reason: SDUPTHER

## 2020-12-17 ENCOUNTER — HOSPITAL ENCOUNTER (EMERGENCY)
Facility: HOSPITAL | Age: 40
Discharge: HOME | End: 2020-12-17
Attending: EMERGENCY MEDICINE | Admitting: EMERGENCY MEDICINE

## 2020-12-17 ENCOUNTER — APPOINTMENT (OUTPATIENT)
Dept: CT IMAGING | Facility: HOSPITAL | Age: 40
End: 2020-12-17

## 2020-12-17 ENCOUNTER — APPOINTMENT (OUTPATIENT)
Dept: MRI IMAGING | Facility: HOSPITAL | Age: 40
End: 2020-12-17

## 2020-12-17 ENCOUNTER — APPOINTMENT (OUTPATIENT)
Dept: GENERAL RADIOLOGY | Facility: HOSPITAL | Age: 40
End: 2020-12-17

## 2020-12-17 VITALS
HEIGHT: 70 IN | WEIGHT: 200 LBS | HEART RATE: 65 BPM | RESPIRATION RATE: 18 BRPM | OXYGEN SATURATION: 98 % | BODY MASS INDEX: 28.63 KG/M2 | SYSTOLIC BLOOD PRESSURE: 140 MMHG | DIASTOLIC BLOOD PRESSURE: 93 MMHG | TEMPERATURE: 98.6 F

## 2020-12-17 DIAGNOSIS — I10 HYPERTENSION, UNSPECIFIED TYPE: Primary | ICD-10-CM

## 2020-12-17 DIAGNOSIS — G43.809 OTHER MIGRAINE WITHOUT STATUS MIGRAINOSUS, NOT INTRACTABLE: ICD-10-CM

## 2020-12-17 LAB
ALBUMIN SERPL-MCNC: 4.2 G/DL (ref 3.5–5.2)
ALBUMIN/GLOB SERPL: 1.3 G/DL
ALP SERPL-CCNC: 88 U/L (ref 39–117)
ALT SERPL W P-5'-P-CCNC: 17 U/L (ref 1–33)
ANION GAP SERPL CALCULATED.3IONS-SCNC: 12 MMOL/L (ref 5–15)
AST SERPL-CCNC: 16 U/L (ref 1–32)
BASOPHILS # BLD AUTO: 0.05 10*3/MM3 (ref 0–0.2)
BASOPHILS NFR BLD AUTO: 0.8 % (ref 0–1.5)
BILIRUB SERPL-MCNC: 0.2 MG/DL (ref 0–1.2)
BUN SERPL-MCNC: 15 MG/DL (ref 6–20)
BUN/CREAT SERPL: 17.4 (ref 7–25)
CALCIUM SPEC-SCNC: 9.4 MG/DL (ref 8.6–10.5)
CHLORIDE SERPL-SCNC: 102 MMOL/L (ref 98–107)
CO2 SERPL-SCNC: 27 MMOL/L (ref 22–29)
CREAT SERPL-MCNC: 0.86 MG/DL (ref 0.57–1)
DEPRECATED RDW RBC AUTO: 43.5 FL (ref 37–54)
EOSINOPHIL # BLD AUTO: 0.21 10*3/MM3 (ref 0–0.4)
EOSINOPHIL NFR BLD AUTO: 3.4 % (ref 0.3–6.2)
ERYTHROCYTE [DISTWIDTH] IN BLOOD BY AUTOMATED COUNT: 12.3 % (ref 12.3–15.4)
GFR SERPL CREATININE-BSD FRML MDRD: 73 ML/MIN/1.73
GLOBULIN UR ELPH-MCNC: 3.3 GM/DL
GLUCOSE SERPL-MCNC: 95 MG/DL (ref 65–99)
HCT VFR BLD AUTO: 40 % (ref 34–46.6)
HGB BLD-MCNC: 13.4 G/DL (ref 12–15.9)
HOLD SPECIMEN: NORMAL
HOLD SPECIMEN: NORMAL
IMM GRANULOCYTES # BLD AUTO: 0.01 10*3/MM3 (ref 0–0.05)
IMM GRANULOCYTES NFR BLD AUTO: 0.2 % (ref 0–0.5)
LYMPHOCYTES # BLD AUTO: 1.7 10*3/MM3 (ref 0.7–3.1)
LYMPHOCYTES NFR BLD AUTO: 27.4 % (ref 19.6–45.3)
MCH RBC QN AUTO: 32.4 PG (ref 26.6–33)
MCHC RBC AUTO-ENTMCNC: 33.5 G/DL (ref 31.5–35.7)
MCV RBC AUTO: 96.6 FL (ref 79–97)
MONOCYTES # BLD AUTO: 0.48 10*3/MM3 (ref 0.1–0.9)
MONOCYTES NFR BLD AUTO: 7.7 % (ref 5–12)
NEUTROPHILS NFR BLD AUTO: 3.76 10*3/MM3 (ref 1.7–7)
NEUTROPHILS NFR BLD AUTO: 60.5 % (ref 42.7–76)
NRBC BLD AUTO-RTO: 0 /100 WBC (ref 0–0.2)
NT-PROBNP SERPL-MCNC: 133.9 PG/ML (ref 0–450)
PLATELET # BLD AUTO: 318 10*3/MM3 (ref 140–450)
PMV BLD AUTO: 9.7 FL (ref 6–12)
POTASSIUM SERPL-SCNC: 3.7 MMOL/L (ref 3.5–5.2)
PROT SERPL-MCNC: 7.5 G/DL (ref 6–8.5)
RBC # BLD AUTO: 4.14 10*6/MM3 (ref 3.77–5.28)
SODIUM SERPL-SCNC: 141 MMOL/L (ref 136–145)
TROPONIN T SERPL-MCNC: <0.01 NG/ML (ref 0–0.03)
WBC # BLD AUTO: 6.21 10*3/MM3 (ref 3.4–10.8)
WHOLE BLOOD HOLD SPECIMEN: NORMAL
WHOLE BLOOD HOLD SPECIMEN: NORMAL

## 2020-12-17 PROCEDURE — 25010000002 LORAZEPAM PER 2 MG: Performed by: EMERGENCY MEDICINE

## 2020-12-17 PROCEDURE — 70450 CT HEAD/BRAIN W/O DYE: CPT

## 2020-12-17 PROCEDURE — 84484 ASSAY OF TROPONIN QUANT: CPT

## 2020-12-17 PROCEDURE — 70551 MRI BRAIN STEM W/O DYE: CPT

## 2020-12-17 PROCEDURE — 25010000002 METOCLOPRAMIDE PER 10 MG: Performed by: PHYSICIAN ASSISTANT

## 2020-12-17 PROCEDURE — 93005 ELECTROCARDIOGRAM TRACING: CPT | Performed by: EMERGENCY MEDICINE

## 2020-12-17 PROCEDURE — 71045 X-RAY EXAM CHEST 1 VIEW: CPT

## 2020-12-17 PROCEDURE — 96375 TX/PRO/DX INJ NEW DRUG ADDON: CPT

## 2020-12-17 PROCEDURE — 99285 EMERGENCY DEPT VISIT HI MDM: CPT

## 2020-12-17 PROCEDURE — 96374 THER/PROPH/DIAG INJ IV PUSH: CPT

## 2020-12-17 PROCEDURE — 25010000002 KETOROLAC TROMETHAMINE PER 15 MG: Performed by: PHYSICIAN ASSISTANT

## 2020-12-17 PROCEDURE — 83880 ASSAY OF NATRIURETIC PEPTIDE: CPT

## 2020-12-17 PROCEDURE — 85025 COMPLETE CBC W/AUTO DIFF WBC: CPT

## 2020-12-17 PROCEDURE — 25010000002 DIPHENHYDRAMINE PER 50 MG: Performed by: PHYSICIAN ASSISTANT

## 2020-12-17 PROCEDURE — 80053 COMPREHEN METABOLIC PANEL: CPT | Performed by: EMERGENCY MEDICINE

## 2020-12-17 PROCEDURE — 93005 ELECTROCARDIOGRAM TRACING: CPT

## 2020-12-17 RX ORDER — KETOROLAC TROMETHAMINE 30 MG/ML
15 INJECTION, SOLUTION INTRAMUSCULAR; INTRAVENOUS ONCE
Status: COMPLETED | OUTPATIENT
Start: 2020-12-17 | End: 2020-12-17

## 2020-12-17 RX ORDER — POTASSIUM CHLORIDE 750 MG/1
20 TABLET, FILM COATED, EXTENDED RELEASE ORAL DAILY
Qty: 14 TABLET | Refills: 0 | Status: SHIPPED | OUTPATIENT
Start: 2020-12-17 | End: 2021-07-22

## 2020-12-17 RX ORDER — SODIUM CHLORIDE 0.9 % (FLUSH) 0.9 %
10 SYRINGE (ML) INJECTION AS NEEDED
Status: DISCONTINUED | OUTPATIENT
Start: 2020-12-17 | End: 2020-12-18 | Stop reason: HOSPADM

## 2020-12-17 RX ORDER — LORAZEPAM 2 MG/ML
1 INJECTION INTRAMUSCULAR ONCE
Status: COMPLETED | OUTPATIENT
Start: 2020-12-17 | End: 2020-12-17

## 2020-12-17 RX ORDER — DIPHENHYDRAMINE HYDROCHLORIDE 50 MG/ML
25 INJECTION INTRAMUSCULAR; INTRAVENOUS ONCE
Status: COMPLETED | OUTPATIENT
Start: 2020-12-17 | End: 2020-12-17

## 2020-12-17 RX ORDER — LABETALOL HYDROCHLORIDE 5 MG/ML
20 INJECTION, SOLUTION INTRAVENOUS ONCE
Status: COMPLETED | OUTPATIENT
Start: 2020-12-17 | End: 2020-12-17

## 2020-12-17 RX ORDER — HYDROCHLOROTHIAZIDE 12.5 MG/1
12.5 TABLET ORAL DAILY
Qty: 14 TABLET | Refills: 0 | Status: SHIPPED | OUTPATIENT
Start: 2020-12-17 | End: 2021-07-22

## 2020-12-17 RX ORDER — METOCLOPRAMIDE HYDROCHLORIDE 5 MG/ML
10 INJECTION INTRAMUSCULAR; INTRAVENOUS ONCE
Status: COMPLETED | OUTPATIENT
Start: 2020-12-17 | End: 2020-12-17

## 2020-12-17 RX ADMIN — LORAZEPAM 1 MG: 2 INJECTION INTRAMUSCULAR; INTRAVENOUS at 20:40

## 2020-12-17 RX ADMIN — KETOROLAC TROMETHAMINE 15 MG: 30 INJECTION, SOLUTION INTRAMUSCULAR at 17:42

## 2020-12-17 RX ADMIN — METOCLOPRAMIDE 10 MG: 5 INJECTION, SOLUTION INTRAMUSCULAR; INTRAVENOUS at 17:43

## 2020-12-17 RX ADMIN — DIPHENHYDRAMINE HYDROCHLORIDE 25 MG: 50 INJECTION INTRAMUSCULAR; INTRAVENOUS at 17:42

## 2020-12-17 RX ADMIN — LABETALOL 20 MG/4 ML (5 MG/ML) INTRAVENOUS SYRINGE 20 MG: at 17:41

## 2020-12-18 ENCOUNTER — TELEMEDICINE (OUTPATIENT)
Dept: CARDIOLOGY | Facility: HOSPITAL | Age: 40
End: 2020-12-18

## 2020-12-18 DIAGNOSIS — I10 MALIGNANT HYPERTENSION: Primary | ICD-10-CM

## 2020-12-18 DIAGNOSIS — R94.31 ABNORMAL ELECTROCARDIOGRAM (ECG) (EKG): ICD-10-CM

## 2020-12-18 PROCEDURE — 99214 OFFICE O/P EST MOD 30 MIN: CPT | Performed by: NURSE PRACTITIONER

## 2020-12-18 RX ORDER — CARVEDILOL 12.5 MG/1
12.5 TABLET ORAL 2 TIMES DAILY
Qty: 60 TABLET | Refills: 11 | Status: SHIPPED | OUTPATIENT
Start: 2020-12-18 | End: 2020-12-21 | Stop reason: SDUPTHER

## 2020-12-18 NOTE — PROGRESS NOTES
You have chosen to receive care through the use of telemedicine. Telemedicine enables health care providers at different locations to provide safe, effective, and convenient care through the use of technology. As with any health care service, there are risks associated with the use of telemedicine, including equipment failure, poor connections, and  issues.    • Do you understand the risks and benefits of telemedicine as I have explained them to you? Yes  • Have your questions regarding telemedicine been answered? Yes  • Do you consent to the use of telemedicine in your medical care today? Yes  [facility]  [facility]  Telemedicine note    12/18/2020         Bebe ENRIQUE Mckenzie  [address] [address]  [unfilled]    1980    Luis Whitman MD Jennikelsie ENRIQUE Mckenzie is a 40 y.o. female.      Subjective:     Chief Complaint:  Hypertension and Establish Care       This was an video enabled telemedicine encounter.    HPI 40-year-old female with telemedicine visit today for ongoing evaluation of her hypertension.  Presented to [facility] ED on 12/17 with uncontrolled hypertension.  She notes headache and fatigue with associated elevated blood pressure readings.  Denies chest pain.  Patient reports medications continue to be added and blood pressure continues to rise.  Denies palpitations, presyncope, syncope, chest pain, dyspnea.  Does note significant fatigue.      Patient Active Problem List   Diagnosis   • Essential hypertension   • Esophageal dysphagia   • Atypical chest pain   • Gastroesophageal reflux disease without esophagitis   • Family history of colon cancer       Past Medical History:   Diagnosis Date   • ADHD (attention deficit hyperactivity disorder)    • Asthma    • Bipolar 1 disorder (CMS/HCC)    • Hemorrhage    • Herpes    • Hypertension    • Migraine    • Urinary tract infection        Past Surgical History:   Procedure  Laterality Date   • CHOLECYSTECTOMY     • HYSTERECTOMY     • OOPHORECTOMY     • ROTATOR CUFF REPAIR     • ULNAR NERVE REPAIR         Family History   Problem Relation Age of Onset   • Arthritis Mother    • Hypertension Mother    • Thyroid disease Mother    • Thyroid cancer Mother    • Heart disease Father    • Hypertension Father    • Hyperlipidemia Father    • Colon cancer Maternal Grandmother    • Breast cancer Paternal Grandmother    • Diabetes Paternal Grandmother    • Colon cancer Paternal Grandmother    • Other Brother         HYDROCELE   • Hypertension Brother    • Other Maternal Grandfather         pulmonary fibrosis   • Early death Paternal Grandfather    • Other Maternal Uncle         pulmonary fibrosis   • Other Maternal Aunt         pulmonary fibrosis       Social History     Socioeconomic History   • Marital status:      Spouse name: Not on file   • Number of children: Not on file   • Years of education: Not on file   • Highest education level: Not on file   Tobacco Use   • Smoking status: Former Smoker     Types: Cigarettes     Start date:      Quit date: 2019     Years since quittin.0   • Smokeless tobacco: Never Used   • Tobacco comment: SOCIAL SMOKER   Substance and Sexual Activity   • Alcohol use: Not Currently   • Drug use: No   • Sexual activity: Defer   Social History Narrative    Caffeine: None       Allergies   Allergen Reactions   • Lamictal [Lamotrigine] Anaphylaxis   • Amlodipine Irritability   • Rocephin [Ceftriaxone] Hives   • Tetracyclines & Related Other (See Comments)     Low h&h         Current Outpatient Medications:   •  cloNIDine (CATAPRES) 0.1 MG tablet, Take 1 tablet by mouth 2 (Two) Times a Day. (Patient taking differently: Take 0.1 mg by mouth 2 (Two) Times a Day As Needed.), Disp: 60 tablet, Rfl: 5  •  hydroCHLOROthiazide (HYDRODIURIL) 12.5 MG tablet, Take 1 tablet by mouth Daily., Disp: 14 tablet, Rfl: 0  •  losartan (Cozaar) 50 MG tablet, Take 1 tablet  by mouth Daily., Disp: 30 tablet, Rfl: 5  •  metoprolol tartrate (LOPRESSOR) 25 MG tablet, Take 1 tablet by mouth 2 (Two) Times a Day. (Patient taking differently: Take 50 mg by mouth 2 (Two) Times a Day.), Disp: 60 tablet, Rfl: 2  •  potassium chloride 10 MEQ CR tablet, Take 2 tablets by mouth Daily., Disp: 14 tablet, Rfl: 0  •  PROAIR  (90 Base) MCG/ACT inhaler, Inhale 2 puffs Every 6 (Six) Hours As Needed., Disp: , Rfl:   •  valACYclovir (VALTREX) 1000 MG tablet, Take 1 tablet by mouth 2 (Two) Times a Day. (Patient taking differently: Take 1,000 mg by mouth 2 (Two) Times a Day As Needed.), Disp: 20 tablet, Rfl: 2  •  ziprasidone (GEODON) 40 MG capsule, Take 1 capsule by mouth 2 (Two) Times a Day., Disp: , Rfl: 5  No current facility-administered medications for this visit.     The following portions of the patient's history were reviewed today and updated as appropriate: allergies, current medications, past family history, past medical history, past social history, past surgical history and problem list     Review of Systems   Constitution: Positive for malaise/fatigue. Negative for chills, decreased appetite, diaphoresis, fever, night sweats, weight gain and weight loss.   HENT: Negative for congestion, hearing loss, hoarse voice and nosebleeds.    Eyes: Negative for blurred vision, visual disturbance and visual halos.   Cardiovascular: Negative for chest pain, claudication, cyanosis, dyspnea on exertion, irregular heartbeat, leg swelling, near-syncope, orthopnea, palpitations, paroxysmal nocturnal dyspnea and syncope.   Respiratory: Negative for cough, hemoptysis, shortness of breath, sleep disturbances due to breathing, snoring, sputum production and wheezing.    Hematologic/Lymphatic: Negative for bleeding problem. Does not bruise/bleed easily.   Skin: Negative for dry skin, itching and rash.   Musculoskeletal: Negative for arthritis, falls, joint pain, joint swelling and myalgias.   Gastrointestinal:  "Negative for bloating, abdominal pain, constipation, diarrhea, flatus, heartburn, hematemesis, hematochezia, melena, nausea and vomiting.   Genitourinary: Negative for dysuria, frequency, hematuria, nocturia and urgency.   Neurological: Positive for headaches. Negative for excessive daytime sleepiness, dizziness, light-headedness, loss of balance and weakness.   Psychiatric/Behavioral: Negative for depression. The patient does not have insomnia and is not nervous/anxious.        Objective:     Vitals:    12/18/20 1401   BP: (!) 219/125   BP Location: Right arm   Patient Position: Sitting   Cuff Size: Adult   Pulse: 96   Weight: 89.8 kg (198 lb)   Height: 177.8 cm (70\")       Body mass index is 28.41 kg/m².    Vitals signs and nursing note reviewed.   Constitutional:       Appearance: Healthy appearance. Not in distress.   Pulmonary:      Effort: Pulmonary effort is normal. No respiratory distress.   Neurological:      Mental Status: Alert and oriented to person, place and time.   Psychiatric:         Attention and Perception: Attention normal.         Mood and Affect: Mood and affect normal.         Speech: Speech normal.         Behavior: Behavior normal.         Lab and Diagnostic Review:  Results for orders placed or performed during the hospital encounter of 12/17/20   Comprehensive Metabolic Panel    Specimen: Blood   Result Value Ref Range    Glucose 95 65 - 99 mg/dL    BUN 15 6 - 20 mg/dL    Creatinine 0.86 0.57 - 1.00 mg/dL    Sodium 141 136 - 145 mmol/L    Potassium 3.7 3.5 - 5.2 mmol/L    Chloride 102 98 - 107 mmol/L    CO2 27.0 22.0 - 29.0 mmol/L    Calcium 9.4 8.6 - 10.5 mg/dL    Total Protein 7.5 6.0 - 8.5 g/dL    Albumin 4.20 3.50 - 5.20 g/dL    ALT (SGPT) 17 1 - 33 U/L    AST (SGOT) 16 1 - 32 U/L    Alkaline Phosphatase 88 39 - 117 U/L    Total Bilirubin 0.2 0.0 - 1.2 mg/dL    eGFR Non African Amer 73 >60 mL/min/1.73    Globulin 3.3 gm/dL    A/G Ratio 1.3 g/dL    BUN/Creatinine Ratio 17.4 7.0 - 25.0 "    Anion Gap 12.0 5.0 - 15.0 mmol/L   BNP    Specimen: Blood   Result Value Ref Range    proBNP 133.9 0.0 - 450.0 pg/mL   Troponin    Specimen: Blood   Result Value Ref Range    Troponin T <0.010 0.000 - 0.030 ng/mL   CBC Auto Differential    Specimen: Blood   Result Value Ref Range    WBC 6.21 3.40 - 10.80 10*3/mm3    RBC 4.14 3.77 - 5.28 10*6/mm3    Hemoglobin 13.4 12.0 - 15.9 g/dL    Hematocrit 40.0 34.0 - 46.6 %    MCV 96.6 79.0 - 97.0 fL    MCH 32.4 26.6 - 33.0 pg    MCHC 33.5 31.5 - 35.7 g/dL    RDW 12.3 12.3 - 15.4 %    RDW-SD 43.5 37.0 - 54.0 fl    MPV 9.7 6.0 - 12.0 fL    Platelets 318 140 - 450 10*3/mm3    Neutrophil % 60.5 42.7 - 76.0 %    Lymphocyte % 27.4 19.6 - 45.3 %    Monocyte % 7.7 5.0 - 12.0 %    Eosinophil % 3.4 0.3 - 6.2 %    Basophil % 0.8 0.0 - 1.5 %    Immature Grans % 0.2 0.0 - 0.5 %    Neutrophils, Absolute 3.76 1.70 - 7.00 10*3/mm3    Lymphocytes, Absolute 1.70 0.70 - 3.10 10*3/mm3    Monocytes, Absolute 0.48 0.10 - 0.90 10*3/mm3    Eosinophils, Absolute 0.21 0.00 - 0.40 10*3/mm3    Basophils, Absolute 0.05 0.00 - 0.20 10*3/mm3    Immature Grans, Absolute 0.01 0.00 - 0.05 10*3/mm3    nRBC 0.0 0.0 - 0.2 /100 WBC   ECG 12 Lead   Result Value Ref Range    QT Interval 360 ms    QTC Interval 438 ms   Light Blue Top   Result Value Ref Range    Extra Tube hold for add-on    Green Top (Gel)   Result Value Ref Range    Extra Tube Hold for add-ons.    Lavender Top   Result Value Ref Range    Extra Tube hold for add-on    Gold Top - SST   Result Value Ref Range    Extra Tube Hold for add-ons.          Assessment and Plan:   1. Malignant hypertension  Stop metoprolol and begin coreg 12.5 mg bid  Monitor bp closely  Continue losartan, HCTZ  - Adult Transthoracic Echo Complete W/ Cont if Necessary Per Protocol; Future    2. Abnormal electrocardiogram (ECG) (EKG)   Upcoming stress   - Adult Transthoracic Echo Complete W/ Cont if Necessary Per Protocol; Future      This visit has been scheduled as a video  visit to comply with patient safety concerns in accordance with CDC recommendations. Total time of discussion was 16 minutes.      It has been a pleasure to participate in the care of this patient.  Patient was instructed to call the [facility] with any questions, concerns, or worsening symptoms.    *Please note that portions of this note were completed with a voice recognition program. Efforts were made to edit the dictations, but occasionally words are mistranscribed.

## 2020-12-18 NOTE — ED PROVIDER NOTES
Subjective   Ms. Rincon is a 40-year female comes to the emergency department today complaining of having a headache and hypertension again.  She has been seen several times and is scheduled for stress test on January 13.  She reports that the we initially did see her we started her on a beta-blocker they have increased her beta-blocker they have added clonidine and they have actually added losartan.  She reports she still having trouble with this blood pressure being labile.  She reports she has had no chest pain no shortness of breath no nausea no vomiting.  Headache is mostly in the occiput.  States that she had headaches like this in the past.  No chest pain or shortness of breath associated with this no upper respiratory symptoms no loss of smell or taste.      History provided by:  Patient   used: No    Hypertension  Severity:  Moderate  Onset quality:  Gradual  Timing:  Sporadic  Progression:  Unchanged  Chronicity:  Recurrent  Context: normal sodium, not caffeine, not drug abuse, not herbal remedies, not medication change, not noncompliance, not OTC medications used and not stress    Relieved by:  Nothing  Worsened by:  Nothing  Ineffective treatments:  None tried  Associated symptoms: headaches    Associated symptoms: no abdominal pain, no blurred vision, no chest pain, no confusion, no dizziness, no ear pain, no epistaxis, no fever, no hematuria, no loss of consciousness, no nausea, no neck pain, no palpitations, no peripheral edema, no shortness of breath, no syncope and no weakness    Risk factors: no alcohol use, no cardiac disease, no cocaine use, no diabetes, no family history of hypertension, no kidney disease, no prior aneurysm and no prior stroke        Review of Systems   Constitutional: Negative for chills and fever.   HENT: Negative for ear pain and nosebleeds.    Eyes: Negative for blurred vision.   Respiratory: Negative for shortness of breath.    Cardiovascular: Negative  for chest pain, palpitations and syncope.   Gastrointestinal: Negative for abdominal pain and nausea.   Genitourinary: Negative for hematuria.   Musculoskeletal: Negative for neck pain.   Neurological: Positive for headaches. Negative for dizziness, loss of consciousness and weakness.   Psychiatric/Behavioral: Negative for confusion.   All other systems reviewed and are negative.      Past Medical History:   Diagnosis Date   • ADHD (attention deficit hyperactivity disorder)    • Asthma    • Bipolar 1 disorder (CMS/HCC)    • Hemorrhage    • Herpes    • Hypertension    • Migraine    • Urinary tract infection        Allergies   Allergen Reactions   • Lamictal [Lamotrigine] Anaphylaxis   • Amlodipine Irritability   • Rocephin [Ceftriaxone] Hives   • Tetracyclines & Related Other (See Comments)     Low h&h       Past Surgical History:   Procedure Laterality Date   • CHOLECYSTECTOMY     • HYSTERECTOMY     • OOPHORECTOMY  2015   • ROTATOR CUFF REPAIR     • ULNAR NERVE REPAIR         Family History   Problem Relation Age of Onset   • Arthritis Mother    • Hypertension Mother    • Thyroid disease Mother    • Thyroid cancer Mother    • Heart disease Father    • Hypertension Father    • Hyperlipidemia Father    • Colon cancer Maternal Grandmother    • Breast cancer Paternal Grandmother    • Diabetes Paternal Grandmother    • Colon cancer Paternal Grandmother    • Other Brother         HYDROCELE   • Hypertension Brother    • Other Maternal Grandfather         pulmonary fibrosis   • Early death Paternal Grandfather    • Other Maternal Uncle         pulmonary fibrosis   • Other Maternal Aunt         pulmonary fibrosis       Social History     Socioeconomic History   • Marital status:      Spouse name: Not on file   • Number of children: Not on file   • Years of education: Not on file   • Highest education level: Not on file   Tobacco Use   • Smoking status: Former Smoker     Types: Cigarettes     Start date: 1998     Quit  date: 2019     Years since quittin.0   • Smokeless tobacco: Never Used   • Tobacco comment: SOCIAL SMOKER   Substance and Sexual Activity   • Alcohol use: Not Currently   • Drug use: No   • Sexual activity: Defer   Social History Narrative    Caffeine: None           Objective   Physical Exam  Vitals signs and nursing note reviewed.   Constitutional:       General: She is not in acute distress.     Appearance: She is well-developed. She is not diaphoretic.   HENT:      Head: Normocephalic and atraumatic.      Nose: Nose normal.   Eyes:      General: No scleral icterus.     Conjunctiva/sclera: Conjunctivae normal.   Neck:      Musculoskeletal: Normal range of motion and neck supple.   Cardiovascular:      Rate and Rhythm: Normal rate and regular rhythm.      Heart sounds: Normal heart sounds. No murmur.   Pulmonary:      Effort: Pulmonary effort is normal. No respiratory distress.      Breath sounds: Normal breath sounds.   Abdominal:      General: Bowel sounds are normal.      Palpations: Abdomen is soft.      Tenderness: There is no abdominal tenderness.   Musculoskeletal: Normal range of motion.   Skin:     General: Skin is warm and dry.   Neurological:      Mental Status: She is alert and oriented to person, place, and time.   Psychiatric:         Behavior: Behavior normal.         Procedures           ED Course                                 Had a long discussion about the abnormal CT but the normal MRI.  We talked about her laboratory data.  She will try to get into her cardiac clinic to get a faster stress test done in .  She had been on a diuretic in the past that seem to be helping her to put her back on some hydrochlorothiazide.  Her blood pressures improved its in the 130s over 90s.  Her headache is much improved.  Do not see a reason for admission at this point.  No results found for this or any previous visit (from the past 24 hour(s)).  Note: In addition to lab results from this visit,  the labs listed above may include labs taken at another facility or during a different encounter within the last 24 hours. Please correlate lab times with ED admission and discharge times for further clarification of the services performed during this visit.    MRI Brain Without Contrast   Final Result   No acute intracranial abnormality such as infarct or bleed. No evidence of PRES or cerebral edema. There are a few scattered nonspecific white matter hyperintensities indicative of mild chronic small vessel disease which is nonspecific.      Signer Name: Ela Collier MD    Signed: 12/17/2020 10:11 PM    Workstation Name: [id number]     [facility]      CT Head Without Contrast   Final Result   1. No evidence of acute infarction, intracranial hemorrhage, or other   clearly acute disease.       2. Subjectively, the gray/white matter interface is less distinct than   on the prior study. A generalized pattern of cerebral edema this   extensive would likely be clinically evident, and this appearance may be   artifactual. If patient has history of severe hypertension with   headache, the possibility of PRES syndrome should be considered and   brain MRI may be helpful.       DICTATED:   12/17/2020   EDITED/ls :   12/17/2020        This report was finalized on 12/17/2020 10:40 PM by Dr. Mathew Burnette MD.          XR Chest 1 View   Final Result   No evidence of acute disease in the chest.        This report was finalized on 12/17/2020 4:50 PM by Brady Browning.            Vitals:    12/17/20 1930 12/17/20 2201 12/17/20 2230 12/17/20 2330   BP: 121/82 143/93 125/94 140/93   BP Location:  Left arm     Patient Position:  Sitting     Pulse: 71 76 70 65   Resp: 18 18 18 18   Temp:       SpO2: 97% 96% 97% 98%   Weight:       Height:         Medications   labetalol (NORMODYNE,TRANDATE) injection 20 mg (20 mg Intravenous Given 12/17/20 1741)   metoclopramide (REGLAN) injection 10 mg (10 mg Intravenous Given  12/17/20 1743)   diphenhydrAMINE (BENADRYL) injection 25 mg (25 mg Intravenous Given 12/17/20 1742)   ketorolac (TORADOL) injection 15 mg (15 mg Intravenous Given 12/17/20 1742)   LORazepam (ATIVAN) injection 1 mg (1 mg Intravenous Given 12/17/20 2040)     ECG/EMG Results (last 24 hours)     Procedure Component Value Units Date/Time    ECG 12 Lead [[id number]] Collected: 12/17/20 1547     Updated: 12/17/20 1548        ECG 12 Lead   Final Result   Test Reason : SOA Protocol   Blood Pressure : **/** mmHG   Vent. Rate : 089 BPM     Atrial Rate : 089 BPM      P-R Int : 146 ms          QRS Dur : 086 ms       QT Int : 360 ms       P-R-T Axes : 047 013 058 degrees      QTc Int : 438 ms      Sinus rhythm with sinus arrhythmia with occasional premature ventricular   complexes   Cannot rule out Anterior infarct (cited on or before 14-DEC-2018)   Abnormal ECG   When compared with ECG of 25-NOV-2020 10:40,   premature ventricular complexes are now present   Confirmed by MD Phipps Michael ([id number]) on 12/19/2020 11:08:48 PM      Referred By:  EDMD           Confirmed By:Luis Phipps MD                    MDM  Number of Diagnoses or Management Options  Hypertension, unspecified type: new and requires workup  Other migraine without status migrainosus, not intractable: new and requires workup     Amount and/or Complexity of Data Reviewed  Clinical lab tests: reviewed and ordered  Tests in the radiology section of CPT®: reviewed and ordered  Tests in the medicine section of CPT®: ordered and reviewed  Discuss the patient with other providers: yes    Patient Progress  Patient progress: stable      Final diagnoses:   Hypertension, unspecified type   Other migraine without status migrainosus, not intractable            Shadi Fitch PA  12/20/20 7128

## 2020-12-19 LAB
QT INTERVAL: 360 MS
QTC INTERVAL: 438 MS

## 2020-12-21 ENCOUNTER — TELEPHONE (OUTPATIENT)
Dept: CARDIOLOGY | Facility: HOSPITAL | Age: 40
End: 2020-12-21

## 2020-12-21 VITALS
HEIGHT: 70 IN | BODY MASS INDEX: 28.35 KG/M2 | HEART RATE: 96 BPM | DIASTOLIC BLOOD PRESSURE: 100 MMHG | SYSTOLIC BLOOD PRESSURE: 198 MMHG | WEIGHT: 198 LBS

## 2020-12-21 RX ORDER — LOSARTAN POTASSIUM 50 MG/1
100 TABLET ORAL DAILY
Qty: 30 TABLET | Refills: 5
Start: 2020-12-21 | End: 2021-07-22

## 2020-12-21 RX ORDER — CARVEDILOL 12.5 MG/1
25 TABLET ORAL 2 TIMES DAILY
Qty: 60 TABLET | Refills: 11
Start: 2020-12-21 | End: 2021-07-22 | Stop reason: SDUPTHER

## 2020-12-21 NOTE — TELEPHONE ENCOUNTER
Spoke with patient who reports blood pressure this a.m. was 200/108.  Patient increase carvedilol to 25 mg twice daily.  Continue losartan, HCTZ and clonidine.      12/20/2020: 1305  Spoke with patient who reports blood pressures 195/100  Increase losartan to 100 mg daily, continue HCTZ, carvedilol and clonidine

## 2020-12-27 ENCOUNTER — APPOINTMENT (OUTPATIENT)
Dept: PREADMISSION TESTING | Facility: HOSPITAL | Age: 40
End: 2020-12-27

## 2020-12-27 LAB — SARS-COV-2 RNA RESP QL NAA+PROBE: NOT DETECTED

## 2020-12-27 PROCEDURE — U0004 COV-19 TEST NON-CDC HGH THRU: HCPCS

## 2020-12-27 PROCEDURE — C9803 HOPD COVID-19 SPEC COLLECT: HCPCS

## 2020-12-29 ENCOUNTER — HOSPITAL ENCOUNTER (OUTPATIENT)
Dept: CARDIOLOGY | Facility: HOSPITAL | Age: 40
Discharge: HOME | End: 2020-12-29

## 2020-12-29 ENCOUNTER — TELEPHONE (OUTPATIENT)
Dept: CARDIOLOGY | Facility: HOSPITAL | Age: 40
End: 2020-12-29

## 2020-12-29 ENCOUNTER — APPOINTMENT (OUTPATIENT)
Dept: PREADMISSION TESTING | Facility: HOSPITAL | Age: 40
End: 2020-12-29

## 2020-12-29 VITALS
SYSTOLIC BLOOD PRESSURE: 160 MMHG | HEIGHT: 70 IN | BODY MASS INDEX: 28.35 KG/M2 | DIASTOLIC BLOOD PRESSURE: 100 MMHG | WEIGHT: 198 LBS | HEART RATE: 95 BPM

## 2020-12-29 VITALS — HEIGHT: 70 IN | WEIGHT: 198 LBS | BODY MASS INDEX: 28.35 KG/M2

## 2020-12-29 DIAGNOSIS — R94.31 ABNORMAL ELECTROCARDIOGRAM (ECG) (EKG): ICD-10-CM

## 2020-12-29 DIAGNOSIS — R07.9 CHEST PAIN, UNSPECIFIED TYPE: ICD-10-CM

## 2020-12-29 DIAGNOSIS — I10 MALIGNANT HYPERTENSION: ICD-10-CM

## 2020-12-29 LAB
ASCENDING AORTA: 3.3 CM
BH CV ECHO MEAS - AO MAX PG (FULL): 3.7 MMHG
BH CV ECHO MEAS - AO MAX PG: 5.9 MMHG
BH CV ECHO MEAS - AO MEAN PG (FULL): 2.1 MMHG
BH CV ECHO MEAS - AO MEAN PG: 3.2 MMHG
BH CV ECHO MEAS - AO ROOT AREA (BSA CORRECTED): 1.6
BH CV ECHO MEAS - AO ROOT AREA: 8.7 CM^2
BH CV ECHO MEAS - AO ROOT DIAM: 3.3 CM
BH CV ECHO MEAS - AO V2 MAX: 121.1 CM/SEC
BH CV ECHO MEAS - AO V2 MEAN: 82.1 CM/SEC
BH CV ECHO MEAS - AO V2 VTI: 23.4 CM
BH CV ECHO MEAS - ASC AORTA: 3.3 CM
BH CV ECHO MEAS - AVA(I,A): 2.1 CM^2
BH CV ECHO MEAS - AVA(I,D): 2.1 CM^2
BH CV ECHO MEAS - AVA(V,A): 1.8 CM^2
BH CV ECHO MEAS - AVA(V,D): 1.8 CM^2
BH CV ECHO MEAS - BSA(HAYCOCK): 2.1 M^2
BH CV ECHO MEAS - BSA: 2.1 M^2
BH CV ECHO MEAS - BZI_BMI: 28.4 KILOGRAMS/M^2
BH CV ECHO MEAS - BZI_METRIC_HEIGHT: 177.8 CM
BH CV ECHO MEAS - BZI_METRIC_WEIGHT: 89.8 KG
BH CV ECHO MEAS - EDV(CUBED): 63.8 ML
BH CV ECHO MEAS - EDV(MOD-SP2): 100 ML
BH CV ECHO MEAS - EDV(MOD-SP4): 99 ML
BH CV ECHO MEAS - EDV(TEICH): 69.9 ML
BH CV ECHO MEAS - EF(CUBED): 66.2 %
BH CV ECHO MEAS - EF(MOD-BP): 55 %
BH CV ECHO MEAS - EF(MOD-SP2): 60 %
BH CV ECHO MEAS - EF(MOD-SP4): 54.5 %
BH CV ECHO MEAS - EF(TEICH): 58.3 %
BH CV ECHO MEAS - ESV(CUBED): 21.6 ML
BH CV ECHO MEAS - ESV(MOD-SP2): 40 ML
BH CV ECHO MEAS - ESV(MOD-SP4): 45 ML
BH CV ECHO MEAS - ESV(TEICH): 29.2 ML
BH CV ECHO MEAS - FS: 30.3 %
BH CV ECHO MEAS - IVS/LVPW: 0.86
BH CV ECHO MEAS - IVSD: 1.1 CM
BH CV ECHO MEAS - LA DIMENSION: 3 CM
BH CV ECHO MEAS - LA/AO: 0.91
BH CV ECHO MEAS - LAD MAJOR: 4.6 CM
BH CV ECHO MEAS - LAT PEAK E' VEL: 11.2 CM/SEC
BH CV ECHO MEAS - LATERAL E/E' RATIO: 5
BH CV ECHO MEAS - LV DIASTOLIC VOL/BSA (35-75): 47.6 ML/M^2
BH CV ECHO MEAS - LV IVRT: 0.09 SEC
BH CV ECHO MEAS - LV MASS(C)D: 164.7 GRAMS
BH CV ECHO MEAS - LV MASS(C)DI: 79.3 GRAMS/M^2
BH CV ECHO MEAS - LV MAX PG: 2.2 MMHG
BH CV ECHO MEAS - LV MEAN PG: 1.1 MMHG
BH CV ECHO MEAS - LV SYSTOLIC VOL/BSA (12-30): 21.7 ML/M^2
BH CV ECHO MEAS - LV V1 MAX: 74 CM/SEC
BH CV ECHO MEAS - LV V1 MEAN: 46.9 CM/SEC
BH CV ECHO MEAS - LV V1 VTI: 16.8 CM
BH CV ECHO MEAS - LVIDD: 4 CM
BH CV ECHO MEAS - LVIDS: 2.8 CM
BH CV ECHO MEAS - LVLD AP2: 9.1 CM
BH CV ECHO MEAS - LVLD AP4: 9.2 CM
BH CV ECHO MEAS - LVLS AP2: 7.7 CM
BH CV ECHO MEAS - LVLS AP4: 8.1 CM
BH CV ECHO MEAS - LVOT AREA (M): 2.8 CM^2
BH CV ECHO MEAS - LVOT AREA: 2.9 CM^2
BH CV ECHO MEAS - LVOT DIAM: 1.9 CM
BH CV ECHO MEAS - LVPWD: 1.3 CM
BH CV ECHO MEAS - MED PEAK E' VEL: 5.6 CM/SEC
BH CV ECHO MEAS - MEDIAL E/E' RATIO: 9.9
BH CV ECHO MEAS - MV A MAX VEL: 70.6 CM/SEC
BH CV ECHO MEAS - MV DEC SLOPE: 213.5 CM/SEC^2
BH CV ECHO MEAS - MV DEC TIME: 0.23 SEC
BH CV ECHO MEAS - MV E MAX VEL: 56.7 CM/SEC
BH CV ECHO MEAS - MV E/A: 0.8
BH CV ECHO MEAS - MV P1/2T MAX VEL: 66.5 CM/SEC
BH CV ECHO MEAS - MV P1/2T: 91.2 MSEC
BH CV ECHO MEAS - MVA P1/2T LCG: 3.3 CM^2
BH CV ECHO MEAS - MVA(P1/2T): 2.4 CM^2
BH CV ECHO MEAS - PA ACC SLOPE: 520.7 CM/SEC^2
BH CV ECHO MEAS - PA ACC TIME: 0.15 SEC
BH CV ECHO MEAS - PA MAX PG: 3.8 MMHG
BH CV ECHO MEAS - PA PR(ACCEL): 12.5 MMHG
BH CV ECHO MEAS - PA V2 MAX: 97.1 CM/SEC
BH CV ECHO MEAS - SI(AO): 98.3 ML/M^2
BH CV ECHO MEAS - SI(CUBED): 20.3 ML/M^2
BH CV ECHO MEAS - SI(LVOT): 23.4 ML/M^2
BH CV ECHO MEAS - SI(MOD-SP2): 28.9 ML/M^2
BH CV ECHO MEAS - SI(MOD-SP4): 26 ML/M^2
BH CV ECHO MEAS - SI(TEICH): 19.6 ML/M^2
BH CV ECHO MEAS - SV(AO): 204.2 ML
BH CV ECHO MEAS - SV(CUBED): 42.2 ML
BH CV ECHO MEAS - SV(LVOT): 48.6 ML
BH CV ECHO MEAS - SV(MOD-SP2): 60 ML
BH CV ECHO MEAS - SV(MOD-SP4): 54 ML
BH CV ECHO MEAS - SV(TEICH): 40.7 ML
BH CV ECHO MEAS - TAPSE (>1.6): 2.3 CM
BH CV ECHO MEASUREMENTS AVERAGE E/E' RATIO: 6.75
BH CV STRESS BP STAGE 1: NORMAL
BH CV STRESS BP STAGE 2: NORMAL
BH CV STRESS DURATION MIN STAGE 1: 3
BH CV STRESS DURATION MIN STAGE 2: 2
BH CV STRESS DURATION SEC STAGE 1: 0
BH CV STRESS DURATION SEC STAGE 2: 31
BH CV STRESS GRADE STAGE 1: 10
BH CV STRESS GRADE STAGE 2: 12
BH CV STRESS HR STAGE 1: 144
BH CV STRESS HR STAGE 2: 166
BH CV STRESS METS STAGE 1: 5
BH CV STRESS METS STAGE 2: 7.5
BH CV STRESS O2 STAGE 1: 99
BH CV STRESS O2 STAGE 2: 98
BH CV STRESS PROTOCOL 1: NORMAL
BH CV STRESS RECOVERY BP: NORMAL MMHG
BH CV STRESS RECOVERY HR: 108 BPM
BH CV STRESS RECOVERY O2: 100 %
BH CV STRESS SPEED STAGE 1: 1.7
BH CV STRESS SPEED STAGE 2: 2.5
BH CV STRESS STAGE 1: 1
BH CV STRESS STAGE 2: 2
BH CV VAS BP LEFT ARM: NORMAL MMHG
BH CV XLRA - RV BASE: 3.8 CM
BH CV XLRA - RV LENGTH: 8 CM
BH CV XLRA - RV MID: 2.6 CM
BH CV XLRA - TDI S': 14.1 CM/SEC
LEFT ATRIUM VOLUME INDEX: 16.8 ML/M^2
LEFT ATRIUM VOLUME: 35 ML
LV EF NUC BP: 54 %
MAXIMAL PREDICTED HEART RATE: 181 BPM
PERCENT MAX PREDICTED HR: 91.71 %
STRESS BASELINE BP: NORMAL MMHG
STRESS BASELINE HR: 81 BPM
STRESS O2 SAT REST: 99 %
STRESS PERCENT HR: 108 %
STRESS POST ESTIMATED WORKLOAD: 7 METS
STRESS POST EXERCISE DUR MIN: 5 MIN
STRESS POST EXERCISE DUR SEC: 31 SEC
STRESS POST O2 SAT PEAK: 98 %
STRESS POST PEAK BP: NORMAL MMHG
STRESS POST PEAK HR: 166 BPM
STRESS TARGET HR: 154 BPM

## 2020-12-29 PROCEDURE — 93017 CV STRESS TEST TRACING ONLY: CPT

## 2020-12-29 PROCEDURE — 93306 TTE W/DOPPLER COMPLETE: CPT | Performed by: INTERNAL MEDICINE

## 2020-12-29 PROCEDURE — A9500 TC99M SESTAMIBI: HCPCS | Performed by: INTERNAL MEDICINE

## 2020-12-29 PROCEDURE — 0 TECHNETIUM SESTAMIBI: Performed by: INTERNAL MEDICINE

## 2020-12-29 PROCEDURE — 63710000001 CLONIDINE 0.1 MG TABLET: Performed by: INTERNAL MEDICINE

## 2020-12-29 PROCEDURE — 78452 HT MUSCLE IMAGE SPECT MULT: CPT | Performed by: INTERNAL MEDICINE

## 2020-12-29 PROCEDURE — 93018 CV STRESS TEST I&R ONLY: CPT | Performed by: INTERNAL MEDICINE

## 2020-12-29 PROCEDURE — 93306 TTE W/DOPPLER COMPLETE: CPT

## 2020-12-29 PROCEDURE — A9270 NON-COVERED ITEM OR SERVICE: HCPCS | Performed by: INTERNAL MEDICINE

## 2020-12-29 PROCEDURE — 78452 HT MUSCLE IMAGE SPECT MULT: CPT

## 2020-12-29 RX ORDER — CLONIDINE HYDROCHLORIDE 0.1 MG/1
0.1 TABLET ORAL ONCE
Status: COMPLETED | OUTPATIENT
Start: 2020-12-29 | End: 2020-12-29

## 2020-12-29 RX ADMIN — TECHNETIUM TC 99M SESTAMIBI 1 DOSE: 1 INJECTION INTRAVENOUS at 11:55

## 2020-12-29 RX ADMIN — CLONIDINE HYDROCHLORIDE 0.1 MG: 0.1 TABLET ORAL at 15:25

## 2020-12-29 RX ADMIN — TECHNETIUM TC 99M SESTAMIBI 1 DOSE: 1 INJECTION INTRAVENOUS at 14:55

## 2020-12-29 NOTE — TELEPHONE ENCOUNTER
----- Message from JOVANNY Bright sent at 12/29/2020  9:33 AM EST -----  Please call her and review bps with her. Thanks  ----- Message -----  From: Arina Saenz APRN  Sent: 12/21/2020   6:33 AM EST  To: JOVANNY Bright    Htn

## 2020-12-30 ENCOUNTER — TELEPHONE (OUTPATIENT)
Dept: INTERNAL MEDICINE | Facility: CLINIC | Age: 40
End: 2020-12-30

## 2020-12-30 NOTE — TELEPHONE ENCOUNTER
Bebe Mckenzie [phone]  Spoke to pt, confirmed the call was regarding her stress test results, which where normal. Apologized for the confusion since previous caller forgot to document call. Good verbal understanding.

## 2021-01-06 ENCOUNTER — TELEPHONE (OUTPATIENT)
Dept: CARDIOLOGY | Facility: HOSPITAL | Age: 41
End: 2021-01-06

## 2021-01-06 NOTE — TELEPHONE ENCOUNTER
Called twice last week and she was suppose to call me back with her blood pressures because she was not home each time I called. She never did call back. Left message today to return my phone call at her earliest convenience.

## 2021-01-10 ENCOUNTER — APPOINTMENT (OUTPATIENT)
Dept: PREADMISSION TESTING | Facility: HOSPITAL | Age: 41
End: 2021-01-10

## 2021-01-13 ENCOUNTER — APPOINTMENT (OUTPATIENT)
Dept: CARDIOLOGY | Facility: HOSPITAL | Age: 41
End: 2021-01-13

## 2021-07-06 ENCOUNTER — HOSPITAL ENCOUNTER (EMERGENCY)
Facility: HOSPITAL | Age: 41
Discharge: HOME | End: 2021-07-07
Attending: EMERGENCY MEDICINE | Admitting: EMERGENCY MEDICINE

## 2021-07-06 ENCOUNTER — APPOINTMENT (OUTPATIENT)
Dept: GENERAL RADIOLOGY | Facility: HOSPITAL | Age: 41
End: 2021-07-06

## 2021-07-06 DIAGNOSIS — J45.20 MILD INTERMITTENT EXTRINSIC ASTHMA WITHOUT COMPLICATION: ICD-10-CM

## 2021-07-06 DIAGNOSIS — R07.89 ATYPICAL CHEST PAIN: Primary | ICD-10-CM

## 2021-07-06 DIAGNOSIS — R06.02 SHORTNESS OF BREATH: ICD-10-CM

## 2021-07-06 PROCEDURE — 85025 COMPLETE CBC W/AUTO DIFF WBC: CPT | Performed by: EMERGENCY MEDICINE

## 2021-07-06 PROCEDURE — 71045 X-RAY EXAM CHEST 1 VIEW: CPT

## 2021-07-06 PROCEDURE — 83690 ASSAY OF LIPASE: CPT | Performed by: EMERGENCY MEDICINE

## 2021-07-06 PROCEDURE — 93005 ELECTROCARDIOGRAM TRACING: CPT | Performed by: EMERGENCY MEDICINE

## 2021-07-06 PROCEDURE — 84484 ASSAY OF TROPONIN QUANT: CPT | Performed by: EMERGENCY MEDICINE

## 2021-07-06 PROCEDURE — 80053 COMPREHEN METABOLIC PANEL: CPT | Performed by: EMERGENCY MEDICINE

## 2021-07-06 PROCEDURE — 93005 ELECTROCARDIOGRAM TRACING: CPT

## 2021-07-06 PROCEDURE — 85007 BL SMEAR W/DIFF WBC COUNT: CPT | Performed by: EMERGENCY MEDICINE

## 2021-07-06 PROCEDURE — 99284 EMERGENCY DEPT VISIT MOD MDM: CPT

## 2021-07-06 PROCEDURE — 83880 ASSAY OF NATRIURETIC PEPTIDE: CPT | Performed by: EMERGENCY MEDICINE

## 2021-07-06 RX ORDER — SODIUM CHLORIDE 0.9 % (FLUSH) 0.9 %
10 SYRINGE (ML) INJECTION AS NEEDED
Status: DISCONTINUED | OUTPATIENT
Start: 2021-07-06 | End: 2021-07-07 | Stop reason: HOSPADM

## 2021-07-06 RX ORDER — ASPIRIN 81 MG/1
324 TABLET, CHEWABLE ORAL ONCE
Status: COMPLETED | OUTPATIENT
Start: 2021-07-06 | End: 2021-07-06

## 2021-07-06 RX ORDER — TOPIRAMATE 25 MG/1
200 CAPSULE, COATED PELLETS ORAL 2 TIMES DAILY
COMMUNITY
End: 2022-10-25

## 2021-07-06 RX ADMIN — ASPIRIN 81 MG CHEWABLE TABLET 324 MG: 81 TABLET CHEWABLE at 23:48

## 2021-07-07 VITALS
HEART RATE: 51 BPM | SYSTOLIC BLOOD PRESSURE: 135 MMHG | DIASTOLIC BLOOD PRESSURE: 98 MMHG | RESPIRATION RATE: 18 BRPM | TEMPERATURE: 98 F | HEIGHT: 70 IN | BODY MASS INDEX: 28.63 KG/M2 | WEIGHT: 200 LBS | OXYGEN SATURATION: 95 %

## 2021-07-07 LAB
ALBUMIN SERPL-MCNC: 4.7 G/DL (ref 3.5–5.2)
ALBUMIN/GLOB SERPL: 1.5 G/DL
ALP SERPL-CCNC: 89 U/L (ref 39–117)
ALT SERPL W P-5'-P-CCNC: 18 U/L (ref 1–33)
ANION GAP SERPL CALCULATED.3IONS-SCNC: 12 MMOL/L (ref 5–15)
AST SERPL-CCNC: 20 U/L (ref 1–32)
BASOPHILS # BLD AUTO: 0.06 10*3/MM3 (ref 0–0.2)
BASOPHILS NFR BLD AUTO: 0.9 % (ref 0–1.5)
BILIRUB SERPL-MCNC: 0.2 MG/DL (ref 0–1.2)
BUN SERPL-MCNC: 18 MG/DL (ref 6–20)
BUN/CREAT SERPL: 16.7 (ref 7–25)
CALCIUM SPEC-SCNC: 9.3 MG/DL (ref 8.6–10.5)
CHLORIDE SERPL-SCNC: 104 MMOL/L (ref 98–107)
CO2 SERPL-SCNC: 23 MMOL/L (ref 22–29)
CREAT SERPL-MCNC: 1.08 MG/DL (ref 0.57–1)
DEPRECATED RDW RBC AUTO: 46.8 FL (ref 37–54)
EOSINOPHIL # BLD AUTO: 0.4 10*3/MM3 (ref 0–0.4)
EOSINOPHIL NFR BLD AUTO: 5.9 % (ref 0.3–6.2)
ERYTHROCYTE [DISTWIDTH] IN BLOOD BY AUTOMATED COUNT: 13.7 % (ref 12.3–15.4)
GFR SERPL CREATININE-BSD FRML MDRD: 56 ML/MIN/1.73
GLOBULIN UR ELPH-MCNC: 3.1 GM/DL
GLUCOSE SERPL-MCNC: 86 MG/DL (ref 65–99)
HCT VFR BLD AUTO: 37.5 % (ref 34–46.6)
HGB BLD-MCNC: 12.7 G/DL (ref 12–15.9)
HOLD SPECIMEN: NORMAL
IMM GRANULOCYTES # BLD AUTO: 0.02 10*3/MM3 (ref 0–0.05)
IMM GRANULOCYTES NFR BLD AUTO: 0.3 % (ref 0–0.5)
LIPASE SERPL-CCNC: 40 U/L (ref 13–60)
LYMPHOCYTES # BLD AUTO: 2.66 10*3/MM3 (ref 0.7–3.1)
LYMPHOCYTES NFR BLD AUTO: 39.4 % (ref 19.6–45.3)
MCH RBC QN AUTO: 31.4 PG (ref 26.6–33)
MCHC RBC AUTO-ENTMCNC: 33.9 G/DL (ref 31.5–35.7)
MCV RBC AUTO: 92.6 FL (ref 79–97)
MONOCYTES # BLD AUTO: 0.45 10*3/MM3 (ref 0.1–0.9)
MONOCYTES NFR BLD AUTO: 6.7 % (ref 5–12)
NEUTROPHILS NFR BLD AUTO: 3.16 10*3/MM3 (ref 1.7–7)
NEUTROPHILS NFR BLD AUTO: 46.8 % (ref 42.7–76)
NRBC BLD AUTO-RTO: 0 /100 WBC (ref 0–0.2)
NT-PROBNP SERPL-MCNC: 130.3 PG/ML (ref 0–450)
PLAT MORPH BLD: NORMAL
PLATELET # BLD AUTO: 283 10*3/MM3 (ref 140–450)
PMV BLD AUTO: 9.6 FL (ref 6–12)
POTASSIUM SERPL-SCNC: 3.5 MMOL/L (ref 3.5–5.2)
PROT SERPL-MCNC: 7.8 G/DL (ref 6–8.5)
RBC # BLD AUTO: 4.05 10*6/MM3 (ref 3.77–5.28)
RBC MORPH BLD: NORMAL
SODIUM SERPL-SCNC: 139 MMOL/L (ref 136–145)
TROPONIN T SERPL-MCNC: <0.01 NG/ML (ref 0–0.03)
TROPONIN T SERPL-MCNC: <0.01 NG/ML (ref 0–0.03)
WBC # BLD AUTO: 6.75 10*3/MM3 (ref 3.4–10.8)
WBC MORPH BLD: NORMAL
WHOLE BLOOD HOLD SPECIMEN: NORMAL

## 2021-07-07 PROCEDURE — 84484 ASSAY OF TROPONIN QUANT: CPT | Performed by: EMERGENCY MEDICINE

## 2021-07-07 RX ORDER — CLONIDINE HYDROCHLORIDE 0.1 MG/1
0.1 TABLET ORAL ONCE
Status: DISCONTINUED | OUTPATIENT
Start: 2021-07-07 | End: 2021-07-07 | Stop reason: HOSPADM

## 2021-07-07 NOTE — ED PROVIDER NOTES
Subjective   Patient is a 41-year-old female with past medical history significant for ADHD, allergy induced asthma, bipolar disorder and hypertension who presents emergency room today with complaints of chest pain and shortness of breath.  Patient shares that she has been having pain in her left anterior chest since approximately 6:30 PM this evening.  She shares with recently she has also had some shortness of breath but shares she believes this is due to her asthma and allergies.  She denies anything specific that makes her symptoms better or worse.  On interview and exam patient is resting comfortably, no acute distress, vital signs stable, asymptomatic and maintaining oxygen saturation of 99% on room air.          Review of Systems   Constitutional: Negative.  Negative for activity change, chills and fever.   HENT: Positive for congestion and rhinorrhea.    Eyes: Negative.    Respiratory: Positive for chest tightness and shortness of breath. Negative for cough.    Cardiovascular: Positive for chest pain. Negative for palpitations and leg swelling.   Gastrointestinal: Negative.    Genitourinary: Negative.    Musculoskeletal: Negative.    Skin: Negative.    Neurological: Negative.    Psychiatric/Behavioral: The patient is hyperactive.        Past Medical History:   Diagnosis Date   • ADHD (attention deficit hyperactivity disorder)    • Asthma    • Bipolar 1 disorder (CMS/[facility])    • Hemorrhage    • Herpes    • Hypertension    • Migraine    • Urinary tract infection        Allergies   Allergen Reactions   • Lamictal [Lamotrigine] Anaphylaxis   • Amlodipine Irritability   • Rocephin [Ceftriaxone] Hives   • Tetracyclines & Related Other (See Comments)     Low h&h       Past Surgical History:   Procedure Laterality Date   • CHOLECYSTECTOMY     • HYSTERECTOMY     • OOPHORECTOMY  2015   • ROTATOR CUFF REPAIR     • ULNAR NERVE REPAIR         Family History   Problem Relation Age of Onset   • Arthritis Mother    •  Hypertension Mother    • Thyroid disease Mother    • Thyroid cancer Mother    • Heart disease Father    • Hypertension Father    • Hyperlipidemia Father    • Colon cancer Maternal Grandmother    • Breast cancer Paternal Grandmother    • Diabetes Paternal Grandmother    • Colon cancer Paternal Grandmother    • Other Brother         HYDROCELE   • Hypertension Brother    • Other Maternal Grandfather         pulmonary fibrosis   • Early death Paternal Grandfather    • Other Maternal Uncle         pulmonary fibrosis   • Other Maternal Aunt         pulmonary fibrosis       Social History     Socioeconomic History   • Marital status:      Spouse name: Not on file   • Number of children: Not on file   • Years of education: Not on file   • Highest education level: Not on file   Tobacco Use   • Smoking status: Former Smoker     Types: Cigarettes     Start date:      Quit date: 2019     Years since quittin.6   • Smokeless tobacco: Never Used   • Tobacco comment: SOCIAL SMOKER   Vaping Use   • Vaping Use: Never used   Substance and Sexual Activity   • Alcohol use: Not Currently   • Drug use: No   • Sexual activity: Defer           Objective   Physical Exam  Vitals and nursing note reviewed.   Constitutional:       General: She is not in acute distress.     Appearance: Normal appearance. She is not ill-appearing or toxic-appearing.   HENT:      Head: Normocephalic and atraumatic.      Nose: Nose normal.      Mouth/Throat:      Mouth: Mucous membranes are moist.   Eyes:      Extraocular Movements: Extraocular movements intact.      Conjunctiva/sclera: Conjunctivae normal.   Cardiovascular:      Rate and Rhythm: Bradycardia present.      Heart sounds: Normal heart sounds.   Pulmonary:      Effort: Pulmonary effort is normal. No respiratory distress.      Breath sounds: Normal breath sounds.   Chest:      Chest wall: No tenderness.   Abdominal:      Palpations: Abdomen is soft.      Tenderness: There is no  abdominal tenderness.   Musculoskeletal:         General: Normal range of motion.      Cervical back: Normal range of motion.   Skin:     General: Skin is warm and dry.   Neurological:      General: No focal deficit present.      Mental Status: She is alert.   Psychiatric:         Mood and Affect: Mood normal.         Behavior: Behavior normal.         Thought Content: Thought content normal.         Judgment: Judgment normal.         Procedures           ED Course  ED Course as of Jul 07 0459 Wed Jul 07, 2021 0454 Patient presents to ED with complaints of chest pain and shortness of breath.  Vital signs stable on exam, no elevation of heart rate, oxygen saturation 95% on room air. PERC negative. Initial and repeat troponin negative. BNP within normal limits. No additional acute or emergent findings on labs.  Sinus bradycardia without acute ST elevations on initial and repeat EKGs.  Chest x-ray without acute cardiopulmonary process.  Heart score 3. Patient given aspirin while in the emergency department.  Vital signs improved with blood pressure normalized at time of disposition.  Patient is afebrile, nontoxic in appearance and vital signs stable.  She will discharged home with outpatient follow-up to the chest pain clinic in addition to her primary care provider.  Patient is agreeable to plan of care of outpatient follow-up, provided clear return precautions and showed understanding.    [JG]      ED Course User Index  [JG] Norbert Dinh PA      Recent Results (from the past 24 hour(s))   Troponin    Collection Time: 07/06/21 11:44 PM    Specimen: Blood   Result Value Ref Range    Troponin T <0.010 0.000 - 0.030 ng/mL   Comprehensive Metabolic Panel    Collection Time: 07/06/21 11:44 PM    Specimen: Blood   Result Value Ref Range    Glucose 86 65 - 99 mg/dL    BUN 18 6 - 20 mg/dL    Creatinine 1.08 (H) 0.57 - 1.00 mg/dL    Sodium 139 136 - 145 mmol/L    Potassium 3.5 3.5 - 5.2 mmol/L    Chloride 104 98 - 107  mmol/L    CO2 23.0 22.0 - 29.0 mmol/L    Calcium 9.3 8.6 - 10.5 mg/dL    Total Protein 7.8 6.0 - 8.5 g/dL    Albumin 4.70 3.50 - 5.20 g/dL    ALT (SGPT) 18 1 - 33 U/L    AST (SGOT) 20 1 - 32 U/L    Alkaline Phosphatase 89 39 - 117 U/L    Total Bilirubin 0.2 0.0 - 1.2 mg/dL    eGFR Non African Amer 56 (L) >60 mL/min/1.73    Globulin 3.1 gm/dL    A/G Ratio 1.5 g/dL    BUN/Creatinine Ratio 16.7 7.0 - 25.0    Anion Gap 12.0 5.0 - 15.0 mmol/L   Lipase    Collection Time: 07/06/21 11:44 PM    Specimen: Blood   Result Value Ref Range    Lipase 40 13 - 60 U/L   BNP    Collection Time: 07/06/21 11:44 PM    Specimen: Blood   Result Value Ref Range    proBNP 130.3 0.0 - 450.0 pg/mL   Green Top (Gel)    Collection Time: 07/06/21 11:44 PM   Result Value Ref Range    Extra Tube Hold for add-ons.    Lavender Top    Collection Time: 07/06/21 11:44 PM   Result Value Ref Range    Extra Tube hold for add-on    Gold Top - SST    Collection Time: 07/06/21 11:44 PM   Result Value Ref Range    Extra Tube Hold for add-ons.    Gray Top    Collection Time: 07/06/21 11:44 PM   Result Value Ref Range    Extra Tube Hold for add-ons.    CBC Auto Differential    Collection Time: 07/06/21 11:44 PM    Specimen: Blood   Result Value Ref Range    WBC 6.75 3.40 - 10.80 10*3/mm3    RBC 4.05 3.77 - 5.28 10*6/mm3    Hemoglobin 12.7 12.0 - 15.9 g/dL    Hematocrit 37.5 34.0 - 46.6 %    MCV 92.6 79.0 - 97.0 fL    MCH 31.4 26.6 - 33.0 pg    MCHC 33.9 31.5 - 35.7 g/dL    RDW 13.7 12.3 - 15.4 %    RDW-SD 46.8 37.0 - 54.0 fl    MPV 9.6 6.0 - 12.0 fL    Platelets 283 140 - 450 10*3/mm3    Neutrophil % 46.8 42.7 - 76.0 %    Lymphocyte % 39.4 19.6 - 45.3 %    Monocyte % 6.7 5.0 - 12.0 %    Eosinophil % 5.9 0.3 - 6.2 %    Basophil % 0.9 0.0 - 1.5 %    Immature Grans % 0.3 0.0 - 0.5 %    Neutrophils, Absolute 3.16 1.70 - 7.00 10*3/mm3    Lymphocytes, Absolute 2.66 0.70 - 3.10 10*3/mm3    Monocytes, Absolute 0.45 0.10 - 0.90 10*3/mm3    Eosinophils, Absolute 0.40  0.00 - 0.40 10*3/mm3    Basophils, Absolute 0.06 0.00 - 0.20 10*3/mm3    Immature Grans, Absolute 0.02 0.00 - 0.05 10*3/mm3    nRBC 0.0 0.0 - 0.2 /100 WBC   Scan Slide    Collection Time: 07/06/21 11:44 PM    Specimen: Blood   Result Value Ref Range    RBC Morphology Normal Normal    WBC Morphology Normal Normal    Platelet Morphology Normal Normal   Troponin    Collection Time: 07/07/21  1:28 AM    Specimen: Blood   Result Value Ref Range    Troponin T <0.010 0.000 - 0.030 ng/mL     Note: In addition to lab results from this visit, the labs listed above may include labs taken at another facility or during a different encounter within the last 24 hours. Please correlate lab times with ED admission and discharge times for further clarification of the services performed during this visit.    XR Chest 1 View   Final Result   No acute disease.             Signer Name: Larissa Lechuga MD    Signed: 7/6/2021 9:32 PM    Workstation Name: [id number]     [facility]        Vitals:    07/07/21 0130 07/07/21 0200 07/07/21 0230 07/07/21 0300   BP: 168/96 159/83 136/78 135/98   Pulse:   (!) 49 51   Resp:       Temp:       TempSrc:       SpO2:    95%   Weight:       Height:         Medications   sodium chloride 0.9 % flush 10 mL (has no administration in time range)   cloNIDine (CATAPRES) tablet 0.1 mg (0.1 mg Oral Not Given 7/7/21 0046)   aspirin chewable tablet 324 mg (324 mg Oral Given 7/6/21 2348)     ECG/EMG Results (last 24 hours)     Procedure Component Value Units Date/Time    ECG 12 Lead [[id number]] Collected: 07/06/21 2025     Updated: 07/06/21 2025    ECG 12 Lead [[id number]] Collected: 07/06/21 2326     Updated: 07/06/21 2327        ECG 12 Lead         ECG 12 Lead                                              HEART Score (for prediction of 6-week risk of major adverse cardiac event) reviewed and/or performed as part of the patient evaluation and treatment planning process.  The result associated  with this review/performance is: 3    PERC Rule (for pulmonary embolism) reviewed and/or performed as part of the patient evaluation and treatment planning process.  The result associated with this review/performance is: 0       MDM  Number of Diagnoses or Management Options  Atypical chest pain: new and requires workup  Mild intermittent extrinsic asthma without complication: minor  Shortness of breath: minor     Amount and/or Complexity of Data Reviewed  Clinical lab tests: reviewed  Tests in the radiology section of CPT®: reviewed  Tests in the medicine section of CPT®: reviewed  Decide to obtain previous medical records or to obtain history from someone other than the patient: yes    Risk of Complications, Morbidity, and/or Mortality  Presenting problems: moderate  Diagnostic procedures: moderate  Management options: moderate    Patient Progress  Patient progress: stable      Final diagnoses:   Atypical chest pain   Shortness of breath   Mild intermittent extrinsic asthma without complication       ED Disposition  ED Disposition     ED Disposition Condition Comment    Discharge Stable           Luis Whitman MD  [address] [facility] [address]  [place] [id number] [phone]    Schedule an appointment as soon as possible for a visit       [facility] Emergency Department  [address] [address] [phone]  Go to   If symptoms worsen    [facility] CARDIOLOGY  [address]  [facility] [address] [phone]  Call   Call for follow-up with chest pain clinic         Medication List      Changed    cloNIDine 0.1 MG tablet  Commonly known as: CATAPRES  Take 1 tablet by mouth 2 (Two) Times a Day.  What changed:   · when to take this  · reasons to take this     hydroCHLOROthiazide 12.5 MG tablet  Commonly known as: HYDRODIURIL  Take 1 tablet by mouth Daily.  What changed: how much to take     valACYclovir 1000 MG  tablet  Commonly known as: Valtrex  Take 1 tablet by mouth 2 (Two) Times a Day.  What changed:   · when to take this  · reasons to take this             Norbert Dinh PA  07/07/21 4823

## 2021-07-07 NOTE — DISCHARGE INSTRUCTIONS
Symptomatic care is recommended. Take all medications as prescribed and instructed. Follow up with your primary care and chest pain clinic as directed or return to Emergency Department with worsening of symptoms.

## 2021-07-19 LAB
QT INTERVAL: 422 MS
QT INTERVAL: 462 MS
QTC INTERVAL: 395 MS
QTC INTERVAL: 438 MS

## 2021-07-22 ENCOUNTER — OFFICE VISIT (OUTPATIENT)
Dept: CARDIOLOGY | Facility: HOSPITAL | Age: 41
End: 2021-07-22

## 2021-07-22 ENCOUNTER — TELEPHONE (OUTPATIENT)
Dept: CARDIOLOGY | Facility: HOSPITAL | Age: 41
End: 2021-07-22

## 2021-07-22 VITALS
WEIGHT: 225.38 LBS | OXYGEN SATURATION: 100 % | SYSTOLIC BLOOD PRESSURE: 162 MMHG | HEIGHT: 70 IN | HEART RATE: 71 BPM | TEMPERATURE: 97.4 F | RESPIRATION RATE: 18 BRPM | DIASTOLIC BLOOD PRESSURE: 106 MMHG | BODY MASS INDEX: 32.27 KG/M2

## 2021-07-22 DIAGNOSIS — I10 ESSENTIAL HYPERTENSION: ICD-10-CM

## 2021-07-22 DIAGNOSIS — R07.9 CHEST PAIN, UNSPECIFIED TYPE: ICD-10-CM

## 2021-07-22 DIAGNOSIS — I10 ESSENTIAL HYPERTENSION: Primary | ICD-10-CM

## 2021-07-22 PROCEDURE — 99214 OFFICE O/P EST MOD 30 MIN: CPT | Performed by: NURSE PRACTITIONER

## 2021-07-22 RX ORDER — CARVEDILOL 25 MG/1
25 TABLET ORAL 2 TIMES DAILY
Qty: 180 TABLET | Refills: 3 | Status: SHIPPED | OUTPATIENT
Start: 2021-07-22

## 2021-07-22 RX ORDER — LOSARTAN POTASSIUM 50 MG/1
75 TABLET ORAL DAILY
Qty: 90 TABLET | Refills: 3 | Status: SHIPPED | OUTPATIENT
Start: 2021-07-22

## 2021-07-22 RX ORDER — HYDROCHLOROTHIAZIDE 25 MG/1
25 TABLET ORAL DAILY
Start: 2021-07-22 | End: 2021-07-22 | Stop reason: SDUPTHER

## 2021-07-22 RX ORDER — HYDROCHLOROTHIAZIDE 25 MG/1
25 TABLET ORAL DAILY
Qty: 90 TABLET | Refills: 3 | Status: SHIPPED | OUTPATIENT
Start: 2021-07-22 | End: 2021-07-29 | Stop reason: SDUPTHER

## 2021-07-22 RX ORDER — LOSARTAN POTASSIUM 50 MG/1
75 TABLET ORAL DAILY
Qty: 30 TABLET | Refills: 5
Start: 2021-07-22 | End: 2021-07-22 | Stop reason: SDUPTHER

## 2021-07-22 RX ORDER — PRENATAL VIT NO.126/IRON/FOLIC 28MG-0.8MG
1 TABLET ORAL DAILY
COMMUNITY

## 2021-07-22 RX ORDER — CARVEDILOL 25 MG/1
25 TABLET ORAL 2 TIMES DAILY
Start: 2021-07-22 | End: 2021-07-22 | Stop reason: SDUPTHER

## 2021-07-22 NOTE — TELEPHONE ENCOUNTER
Patient called and stated she spoke with [facility] and they did not receive any of her medication prescriptions.

## 2021-07-22 NOTE — PROGRESS NOTES
"Chief Complaint  Chest Pain and Follow-up    Subjective    History of Present Illness {CC  Problem List  Visit  Diagnosis   Encounters  Notes  Medications  Labs  Result Review Imaging  Media :23}     Bebe Rincon, 41 y.o. female with ADHD, allergy induced asthma, bipolar disorder and hypertension presents to [facility] for Chest Pain and Follow-up.    Patient was recently evaluated at [facility] emergency department for complaints of  Chest pain and dyspnea.  Emergency department work-up revealed normal troponin/BNP, CXR with no acute cardiopulmonary findings, and ECGs with no evidence of acute coronary syndrome. Patient was instructed to follow-up at [facility] heart and valve clinic for further evaluation.    Patient presents today with continued hypertension.  Blood pressures at her rehab facility have been labile, but generally with SBPs over 130s. She notes intermittent chest pressure with hypertensive issues; denies anginal symptoms when blood pressure controlled. Patient denies anginal pain, dyspnea, racing heart, and syncope. Previous TTE and normal stress test results revealed with patient from 12/2020.         Objective     Vital Signs:   Vitals:    07/22/21 1112 07/22/21 1137   BP: (!) 197/106 (!) 162/106   BP Location: Left arm Left arm   Patient Position: Sitting Sitting   Cuff Size: Adult    Pulse: 71    Resp: 18    Temp: 97.4 °F (36.3 °C)    TempSrc: Temporal    SpO2: 100%    Weight: 102 kg (225 lb 6 oz)    Height: 177.8 cm (70\")      Body mass index is 32.34 kg/m².  Physical Exam  Vitals and nursing note reviewed.   Constitutional:       Appearance: Normal appearance.   HENT:      Head: Normocephalic.   Eyes:      Extraocular Movements: Extraocular movements intact.   Neck:      Vascular: No carotid bruit.   Cardiovascular:      Rate and Rhythm: Normal rate and regular rhythm.      Pulses: Normal pulses.      Heart sounds: Normal " heart sounds, S1 normal and S2 normal. No murmur heard.     Pulmonary:      Effort: Pulmonary effort is normal. No respiratory distress.      Breath sounds: Normal breath sounds.   Musculoskeletal:      Cervical back: Neck supple.      Right lower leg: Edema present.      Left lower leg: Edema present.      Comments: Trace LE edema   Skin:     General: Skin is warm and dry.   Neurological:      General: No focal deficit present.      Mental Status: She is alert.   Psychiatric:         Mood and Affect: Mood normal.         Behavior: Behavior normal.         Thought Content: Thought content normal.          Data Reviewed:{ Labs  Result Review  Imaging  Med Tab  Media :23}     Troponin (07/07/2021 01:28)  BNP (07/06/2021 23:44)  Lipase (07/06/2021 23:44)  Comprehensive Metabolic Panel (07/06/2021 23:44)  CBC & Differential (07/06/2021 23:44)  Troponin (07/06/2021 23:44)  XR Chest 1 View (07/06/2021 21:13)    Adult Transthoracic Echo Complete W/ Cont if Necessary Per Protocol (12/29/2020 16:47)  Stress Test With Myocardial Perfusion One Day (12/29/2020 14:47)  ECG 12 Lead (07/06/2021 23:26)      Assessment and Plan {CC Problem List  Visit Diagnosis  ROS  Review (Popup)  Health Maintenance  Quality  BestPractice  Medications  SmartSets  SnapShot Encounters  Media :23}     1. Essential hypertension  - Elevated at time of visit 162/106  - Increase Losartan to 75mg daily  - losartan (Cozaar) 50 MG tablet; Take 1.5 tablets by mouth Daily.  Dispense: 30 tablet; Refill: 5  - hydroCHLOROthiazide (HYDRODIURIL) 25 MG tablet; Take 1 tablet by mouth Daily.  - carvedilol (COREG) 25 MG tablet; Take 1 tablet by mouth 2 (Two) Times a Day.  - PRN clodine for SBP>180  - Monitor home Bps with log for next visit.    2. Chest pain, unspecified type  - Associated with hypertensive issues  - Previous nuclear stress test 12/2020 normal; symptoms consistent with chest pain at that time when hypertensive  - May consider  amlodipine as next agent if she remains hypertensive      Follow Up {Instructions Charge Capture  Follow-up Communications :23}     Return in about 1 week (around 7/29/2021) for Phone visit, BP check.    Patient was given instructions and counseling regarding her condition or for health maintenance advice. Please see specific information pulled into the AVS if appropriate.  Patient was instructed to call the Heart and Valve Center with any questions, concerns, or worsening symptoms.    *Please note that portions of this note were completed with a voice recognition program. Efforts were made to edit the dictations, but occasionally words are mistranscribed.

## 2021-07-29 ENCOUNTER — TELEMEDICINE (OUTPATIENT)
Dept: CARDIOLOGY | Facility: HOSPITAL | Age: 41
End: 2021-07-29

## 2021-07-29 VITALS
DIASTOLIC BLOOD PRESSURE: 97 MMHG | HEIGHT: 70 IN | HEART RATE: 72 BPM | BODY MASS INDEX: 31.92 KG/M2 | WEIGHT: 223 LBS | SYSTOLIC BLOOD PRESSURE: 152 MMHG

## 2021-07-29 DIAGNOSIS — R07.9 CHEST PAIN, UNSPECIFIED TYPE: ICD-10-CM

## 2021-07-29 DIAGNOSIS — I10 ESSENTIAL HYPERTENSION: Primary | ICD-10-CM

## 2021-07-29 PROCEDURE — 99442 PR PHYS/QHP TELEPHONE EVALUATION 11-20 MIN: CPT | Performed by: NURSE PRACTITIONER

## 2021-07-29 RX ORDER — HYDROCHLOROTHIAZIDE 25 MG/1
50 TABLET ORAL DAILY
Qty: 90 TABLET | Refills: 3 | OUTPATIENT
Start: 2021-07-29 | End: 2022-10-15

## 2021-07-29 NOTE — PROGRESS NOTES
"Chief Complaint  Follow-up and Hypertension    [facility]  Telemedicine note    This was an telephone enabled telemedicine encounter.    Subjective    History of Present Illness {CC  Problem List  Visit  Diagnosis   Encounters  Notes  Medications  Labs  Result Review Imaging  Media :23}     Bebe Rincon, 41 y.o. female with ADHD, allergy induced asthma, bipolar disorder and hypertension presents to [facility] telemedicine visit for Follow-up and Hypertension    Patient states that blood pressures have been averaging 140-150s since her last visit. Blood pressure as high as 180 SBP, lowest .  She has taken clonidine for elevated blood pressures.  She states that chest pain that she described at previous visit has improved with further blood pressure reduction; no anginal equivalence. She does note slightly increase edema since her last visit in feet and hands. She denies increased dyspnea, agitation, racing heart, and syncope.  She follows with [facility] with an upcoming appointment on Monday, discussed obtaining BMP at visit.      Objective     Vital Signs:   Vitals:    07/29/21 1106   BP: 152/97   Pulse: 72   Weight: 101 kg (223 lb)   Height: 177.8 cm (70\")     Body mass index is 32 kg/m².  Physical Exam  Pulmonary:      Effort: Pulmonary effort is normal. No respiratory distress.   Neurological:      Mental Status: She is alert and oriented to person, place, and time.   Psychiatric:         Mood and Affect: Mood normal.         Behavior: Behavior normal.         Thought Content: Thought content normal.        Data Reviewed:{ Labs  Result Review  Imaging  Med Tab  Media :23}     Stress Test With Myocardial Perfusion One Day (12/29/2020 14:47)  Adult Transthoracic Echo Complete W/ Cont if Necessary Per Protocol (12/29/2020 16:47)  ECG 12 Lead (07/06/2021 23:26)  Comprehensive Metabolic Panel (07/06/2021 " 23:44)    Assessment and Plan {CC Problem List  Visit Diagnosis  ROS  Review (Popup)  Health Maintenance  Quality  BestPractice  Medications  SmartSets  SnapShot Encounters  Media :23}     This visit has been scheduled as a telephone visit to comply with patient safety concerns in accordance with CDC recommendations. Total time of discussion was 12 minutes.    1. Essential hypertension  -Blood pressures have been averaging 140-150s since her last visit, which are slightly improved. Blood pressure as high as 180 SBP, lowest .   -Given increased reported edema will uptitrate HCTZ for further blood pressure management  -Continue losartan 75 mg daily  -Continue clonidine as needed for SBP greater than 180  -hydroCHLOROthiazide (HYDRODIURIL) 25 MG tablet; Take 2 tablets by mouth Daily.  Dispense: 90 tablet; Refill: 3    2. Chest pain, unspecified type  -Improved with further blood pressure control  -Previous myocardial perfusion stress test 12/2020 without evidence of ischemia.      Follow Up {Instructions Charge Capture  Follow-up Communications :23}   Return in about 2 weeks (around 8/12/2021) for Phone visit. BP check.    Patient was given instructions and counseling regarding her condition or for health maintenance advice. Please see specific information pulled into the AVS if appropriate.  Patient was instructed to call the [facility] Center with any questions, concerns, or worsening symptoms.    *Please note that portions of this note were completed with a voice recognition program. Efforts were made to edit the dictations, but occasionally words are mistranscribed.

## 2021-08-20 ENCOUNTER — OFFICE VISIT (OUTPATIENT)
Dept: CARDIOLOGY | Facility: HOSPITAL | Age: 41
End: 2021-08-20

## 2021-08-20 VITALS
WEIGHT: 219 LBS | HEIGHT: 70 IN | BODY MASS INDEX: 31.35 KG/M2 | HEART RATE: 54 BPM | DIASTOLIC BLOOD PRESSURE: 76 MMHG | SYSTOLIC BLOOD PRESSURE: 124 MMHG

## 2021-08-20 DIAGNOSIS — I10 ESSENTIAL HYPERTENSION: Primary | ICD-10-CM

## 2021-08-20 DIAGNOSIS — R07.9 CHEST PAIN, UNSPECIFIED TYPE: ICD-10-CM

## 2021-08-20 PROCEDURE — 99442 PR PHYS/QHP TELEPHONE EVALUATION 11-20 MIN: CPT | Performed by: NURSE PRACTITIONER

## 2021-08-20 RX ORDER — NALTREXONE HYDROCHLORIDE 50 MG/1
50 TABLET, FILM COATED ORAL DAILY
COMMUNITY
Start: 2021-08-10 | End: 2022-10-15

## 2021-08-20 RX ORDER — TRAZODONE HYDROCHLORIDE 50 MG/1
50 TABLET ORAL NIGHTLY
COMMUNITY
Start: 2021-08-10 | End: 2022-10-25

## 2021-08-20 RX ORDER — NALTREXONE 380 MG
KIT INTRAMUSCULAR
COMMUNITY
Start: 2021-08-09 | End: 2022-10-15

## 2021-08-20 RX ORDER — IBUPROFEN 800 MG/1
800 TABLET ORAL EVERY 6 HOURS PRN
COMMUNITY
Start: 2021-08-10

## 2021-08-20 RX ORDER — PAROXETINE 30 MG/1
30 TABLET, FILM COATED ORAL DAILY
COMMUNITY
Start: 2021-08-10 | End: 2022-10-15

## 2021-08-20 RX ORDER — VALACYCLOVIR HYDROCHLORIDE 1 G/1
1000 TABLET, FILM COATED ORAL DAILY
COMMUNITY
Start: 2021-08-02

## 2021-08-20 NOTE — PROGRESS NOTES
You have chosen to receive care through the use of telemedicine. Telemedicine enables health care providers at different locations to provide safe, effective, and convenient care through the use of technology. As with any health care service, there are risks associated with the use of telemedicine, including equipment failure, poor connections, and  issues.    • Do you understand the risks and benefits of telemedicine as I have explained them to you? Yes   • Have your questions regarding telemedicine been answered? yes  Do you consent to the use of telemedicine in your medical care today? yes   You have chosen to receive care through the use of telemedicine. Telemedicine enables health care providers at different locations to provide safe, effective, and convenient care through the use of technology. As with any health care service, there are risks associated with the use of telemedicine, including equipment failure, poor connections, and  issues.    Do you understand the risks and benefits of telemedicine as I have explained them to you? yes  Have your questions regarding telemedicine been answered? yes  • Do you consent to the use of telemedicine in your medical care today? yes      Chief Complaint  Follow-up and Hypertension    [facility]  Telemedicine note    This was an telephone enabled telemedicine encounter.    Subjective    History of Present Illness {CC  Problem List  Visit  Diagnosis   Encounters  Notes  Medications  Labs  Result Review Imaging  Media :23}     Bebe Rincon, 41 y.o. female with ADHD, allergy induced asthma, bipolar disorder and hypertension presents to [facility] telemedicine visit for Follow-up and Hypertension.    Patient states that blood pressures have been averaging 120/70s since her last visit. She has only taken clonidine twice for elevated blood pressures since  "her last visit; episodes occurred during stressful events. Reports that edema is much improved since initiation of HCTZ. She denies chest pain, dyspnea, palpitation, racing heart, and syncope.  She follows with [facility] with an upcoming appointment in 2 weeks.    Objective     Vital Signs:   Vitals:    08/20/21 1300   BP: 124/76   BP Location: Left arm   Patient Position: Sitting   Pulse: 54   Weight: 99.3 kg (219 lb)   Height: 177.8 cm (70\")     Body mass index is 31.42 kg/m².  Physical Exam  Vitals and nursing note reviewed.   Pulmonary:      Effort: Pulmonary effort is normal. No respiratory distress.   Neurological:      Mental Status: She is alert and oriented to person, place, and time.   Psychiatric:         Mood and Affect: Mood normal.         Behavior: Behavior normal.         Thought Content: Thought content normal.       Data Reviewed:{ Labs  Result Review  Imaging  Med Tab  Media :23}     Comprehensive Metabolic Panel (07/06/2021 23:44)  Adult Transthoracic Echo Complete W/ Cont if Necessary Per Protocol (12/29/2020 16:47)  Stress Test With Myocardial Perfusion One Day (12/29/2020 14:47)  ECG 12 Lead (07/06/2021 23:26)      Assessment and Plan {CC Problem List  Visit Diagnosis  ROS  Review (Popup)  Health Maintenance  Quality  BestPractice  Medications  SmartSets  SnapShot Encounters  Media :23}     1. Essential hypertension  -Blood pressures have been averaging 120/70s since previous visit. 2 isolated hypertensive events during stressful situations.  -Edema improved since previous visit  -Continue losartan 75 mg daily  -Continue carvedilol 25mg BID  -Continue HCTZ 50mg daily  -Continue clonidine as needed for SBP greater than 180  -Close follow-up with [facility] for PCP needs. Follow-up in [facility] as needed or recommended by PCP    2. Chest pain, unspecified type  -Denies chest pain/pressure, anginal symptoms with improved blood pressure  -Previous myocardial perfusion " stress test 12/2020 without evidence of ischemia.      This visit has been scheduled as a telephone visit to comply with patient safety concerns in accordance with CDC recommendations. Total time of discussion, chart review was 16 minutes.      Follow Up {Instructions Charge Capture  Follow-up Communications :23}   Return if symptoms worsen or fail to improve.    Patient was given instructions and counseling regarding her condition or for health maintenance advice. Please see specific information pulled into the AVS if appropriate.  Patient was instructed to call the [facility] Center with any questions, concerns, or worsening symptoms.    *Please note that portions of this note were completed with a voice recognition program. Efforts were made to edit the dictations, but occasionally words are mistranscribed.

## 2022-10-15 ENCOUNTER — APPOINTMENT (OUTPATIENT)
Dept: GENERAL RADIOLOGY | Facility: HOSPITAL | Age: 42
End: 2022-10-15

## 2022-10-15 ENCOUNTER — HOSPITAL ENCOUNTER (EMERGENCY)
Facility: HOSPITAL | Age: 42
Discharge: HOME | End: 2022-10-15
Attending: EMERGENCY MEDICINE | Admitting: EMERGENCY MEDICINE

## 2022-10-15 VITALS
OXYGEN SATURATION: 98 % | HEART RATE: 69 BPM | TEMPERATURE: 98.3 F | HEIGHT: 70 IN | DIASTOLIC BLOOD PRESSURE: 106 MMHG | WEIGHT: 180 LBS | SYSTOLIC BLOOD PRESSURE: 146 MMHG | RESPIRATION RATE: 20 BRPM | BODY MASS INDEX: 25.77 KG/M2

## 2022-10-15 DIAGNOSIS — R07.89 ATYPICAL CHEST PAIN: Primary | ICD-10-CM

## 2022-10-15 DIAGNOSIS — R11.0 NAUSEA: ICD-10-CM

## 2022-10-15 LAB
ALBUMIN SERPL-MCNC: 4 G/DL (ref 3.5–5.2)
ALBUMIN/GLOB SERPL: 1.4 G/DL
ALP SERPL-CCNC: 81 U/L (ref 39–117)
ALT SERPL W P-5'-P-CCNC: 12 U/L (ref 1–33)
ANION GAP SERPL CALCULATED.3IONS-SCNC: 9 MMOL/L (ref 5–15)
AST SERPL-CCNC: 13 U/L (ref 1–32)
BASOPHILS # BLD AUTO: 0.02 10*3/MM3 (ref 0–0.2)
BASOPHILS NFR BLD AUTO: 0.2 % (ref 0–1.5)
BILIRUB SERPL-MCNC: 0.2 MG/DL (ref 0–1.2)
BUN SERPL-MCNC: 16 MG/DL (ref 6–20)
BUN/CREAT SERPL: 13.7 (ref 7–25)
CALCIUM SPEC-SCNC: 8.8 MG/DL (ref 8.6–10.5)
CHLORIDE SERPL-SCNC: 108 MMOL/L (ref 98–107)
CO2 SERPL-SCNC: 21 MMOL/L (ref 22–29)
CREAT SERPL-MCNC: 1.17 MG/DL (ref 0.57–1)
DEPRECATED RDW RBC AUTO: 45.3 FL (ref 37–54)
EGFRCR SERPLBLD CKD-EPI 2021: 60.2 ML/MIN/1.73
EOSINOPHIL # BLD AUTO: 0.13 10*3/MM3 (ref 0–0.4)
EOSINOPHIL NFR BLD AUTO: 1.6 % (ref 0.3–6.2)
ERYTHROCYTE [DISTWIDTH] IN BLOOD BY AUTOMATED COUNT: 13 % (ref 12.3–15.4)
GLOBULIN UR ELPH-MCNC: 2.9 GM/DL
GLUCOSE SERPL-MCNC: 83 MG/DL (ref 65–99)
HCT VFR BLD AUTO: 38.7 % (ref 34–46.6)
HGB BLD-MCNC: 13 G/DL (ref 12–15.9)
HOLD SPECIMEN: NORMAL
IMM GRANULOCYTES # BLD AUTO: 0.02 10*3/MM3 (ref 0–0.05)
IMM GRANULOCYTES NFR BLD AUTO: 0.2 % (ref 0–0.5)
LIPASE SERPL-CCNC: 33 U/L (ref 13–60)
LYMPHOCYTES # BLD AUTO: 1.4 10*3/MM3 (ref 0.7–3.1)
LYMPHOCYTES NFR BLD AUTO: 16.9 % (ref 19.6–45.3)
MCH RBC QN AUTO: 31.9 PG (ref 26.6–33)
MCHC RBC AUTO-ENTMCNC: 33.6 G/DL (ref 31.5–35.7)
MCV RBC AUTO: 94.9 FL (ref 79–97)
MONOCYTES # BLD AUTO: 0.49 10*3/MM3 (ref 0.1–0.9)
MONOCYTES NFR BLD AUTO: 5.9 % (ref 5–12)
NEUTROPHILS NFR BLD AUTO: 6.24 10*3/MM3 (ref 1.7–7)
NEUTROPHILS NFR BLD AUTO: 75.2 % (ref 42.7–76)
NRBC BLD AUTO-RTO: 0 /100 WBC (ref 0–0.2)
NT-PROBNP SERPL-MCNC: 116.7 PG/ML (ref 0–450)
PLATELET # BLD AUTO: 283 10*3/MM3 (ref 140–450)
PMV BLD AUTO: 9.6 FL (ref 6–12)
POTASSIUM SERPL-SCNC: 3.7 MMOL/L (ref 3.5–5.2)
PROT SERPL-MCNC: 6.9 G/DL (ref 6–8.5)
RBC # BLD AUTO: 4.08 10*6/MM3 (ref 3.77–5.28)
SODIUM SERPL-SCNC: 138 MMOL/L (ref 136–145)
TROPONIN T SERPL-MCNC: <0.01 NG/ML (ref 0–0.03)
TROPONIN T SERPL-MCNC: <0.01 NG/ML (ref 0–0.03)
WBC NRBC COR # BLD: 8.3 10*3/MM3 (ref 3.4–10.8)
WHOLE BLOOD HOLD COAG: NORMAL
WHOLE BLOOD HOLD SPECIMEN: NORMAL

## 2022-10-15 PROCEDURE — 80053 COMPREHEN METABOLIC PANEL: CPT | Performed by: EMERGENCY MEDICINE

## 2022-10-15 PROCEDURE — 96375 TX/PRO/DX INJ NEW DRUG ADDON: CPT

## 2022-10-15 PROCEDURE — 71045 X-RAY EXAM CHEST 1 VIEW: CPT

## 2022-10-15 PROCEDURE — 83880 ASSAY OF NATRIURETIC PEPTIDE: CPT | Performed by: EMERGENCY MEDICINE

## 2022-10-15 PROCEDURE — 93005 ELECTROCARDIOGRAM TRACING: CPT | Performed by: EMERGENCY MEDICINE

## 2022-10-15 PROCEDURE — 83690 ASSAY OF LIPASE: CPT | Performed by: EMERGENCY MEDICINE

## 2022-10-15 PROCEDURE — 99284 EMERGENCY DEPT VISIT MOD MDM: CPT

## 2022-10-15 PROCEDURE — 84484 ASSAY OF TROPONIN QUANT: CPT | Performed by: EMERGENCY MEDICINE

## 2022-10-15 PROCEDURE — 25010000002 DROPERIDOL PER 5 MG: Performed by: PHYSICIAN ASSISTANT

## 2022-10-15 PROCEDURE — 96374 THER/PROPH/DIAG INJ IV PUSH: CPT

## 2022-10-15 PROCEDURE — 93005 ELECTROCARDIOGRAM TRACING: CPT

## 2022-10-15 PROCEDURE — 25010000002 PROCHLORPERAZINE 10 MG/2ML SOLUTION: Performed by: PHYSICIAN ASSISTANT

## 2022-10-15 PROCEDURE — 25010000002 DIPHENHYDRAMINE PER 50 MG: Performed by: PHYSICIAN ASSISTANT

## 2022-10-15 PROCEDURE — 85025 COMPLETE CBC W/AUTO DIFF WBC: CPT | Performed by: EMERGENCY MEDICINE

## 2022-10-15 PROCEDURE — 36415 COLL VENOUS BLD VENIPUNCTURE: CPT

## 2022-10-15 RX ORDER — SPIRONOLACTONE 100 MG/1
100 TABLET, FILM COATED ORAL 2 TIMES DAILY
COMMUNITY

## 2022-10-15 RX ORDER — DIPHENHYDRAMINE HYDROCHLORIDE 50 MG/ML
12.5 INJECTION INTRAMUSCULAR; INTRAVENOUS ONCE
Status: COMPLETED | OUTPATIENT
Start: 2022-10-15 | End: 2022-10-15

## 2022-10-15 RX ORDER — NITROGLYCERIN 0.4 MG/1
0.4 TABLET SUBLINGUAL
Status: DISCONTINUED | OUTPATIENT
Start: 2022-10-15 | End: 2022-10-15 | Stop reason: HOSPADM

## 2022-10-15 RX ORDER — DROPERIDOL 2.5 MG/ML
2.5 INJECTION, SOLUTION INTRAMUSCULAR; INTRAVENOUS ONCE
Status: COMPLETED | OUTPATIENT
Start: 2022-10-15 | End: 2022-10-15

## 2022-10-15 RX ORDER — BUPROPION HYDROCHLORIDE 300 MG/1
300 TABLET ORAL DAILY
COMMUNITY

## 2022-10-15 RX ORDER — PROCHLORPERAZINE EDISYLATE 5 MG/ML
2.5 INJECTION INTRAMUSCULAR; INTRAVENOUS EVERY 6 HOURS PRN
Status: DISCONTINUED | OUTPATIENT
Start: 2022-10-15 | End: 2022-10-15 | Stop reason: HOSPADM

## 2022-10-15 RX ORDER — PROMETHAZINE HYDROCHLORIDE 25 MG/1
25 TABLET ORAL EVERY 6 HOURS PRN
Qty: 10 TABLET | Refills: 0 | Status: SHIPPED | OUTPATIENT
Start: 2022-10-15

## 2022-10-15 RX ORDER — BUPROPION HYDROCHLORIDE 150 MG/1
150 TABLET ORAL DAILY
COMMUNITY

## 2022-10-15 RX ORDER — FLUOXETINE HYDROCHLORIDE 20 MG/1
20 CAPSULE ORAL 2 TIMES DAILY
COMMUNITY
End: 2022-10-25

## 2022-10-15 RX ORDER — SODIUM CHLORIDE 0.9 % (FLUSH) 0.9 %
10 SYRINGE (ML) INJECTION AS NEEDED
Status: DISCONTINUED | OUTPATIENT
Start: 2022-10-15 | End: 2022-10-15 | Stop reason: HOSPADM

## 2022-10-15 RX ORDER — ASPIRIN 81 MG/1
324 TABLET, CHEWABLE ORAL ONCE
Status: COMPLETED | OUTPATIENT
Start: 2022-10-15 | End: 2022-10-15

## 2022-10-15 RX ORDER — PANTOPRAZOLE SODIUM 20 MG/1
20 TABLET, DELAYED RELEASE ORAL 2 TIMES DAILY
COMMUNITY

## 2022-10-15 RX ADMIN — NITROGLYCERIN 0.4 MG: 0.4 TABLET SUBLINGUAL at 20:22

## 2022-10-15 RX ADMIN — PROCHLORPERAZINE EDISYLATE 2.5 MG: 5 INJECTION INTRAMUSCULAR; INTRAVENOUS at 18:24

## 2022-10-15 RX ADMIN — ASPIRIN 81 MG 324 MG: 81 TABLET ORAL at 17:23

## 2022-10-15 RX ADMIN — DIPHENHYDRAMINE HYDROCHLORIDE 12.5 MG: 50 INJECTION, SOLUTION INTRAMUSCULAR; INTRAVENOUS at 19:14

## 2022-10-15 RX ADMIN — DROPERIDOL 2.5 MG: 2.5 INJECTION, SOLUTION INTRAMUSCULAR; INTRAVENOUS at 20:53

## 2022-10-15 RX ADMIN — NITROGLYCERIN 0.4 MG: 0.4 TABLET SUBLINGUAL at 20:53

## 2022-10-15 RX ADMIN — ALUMINUM HYDROXIDE, MAGNESIUM HYDROXIDE, AND DIMETHICONE: 400; 400; 40 SUSPENSION ORAL at 17:43

## 2022-10-15 NOTE — ED PROVIDER NOTES
"Subjective   History of Present Illness  Mr. Rincon is a 42 yr old female who presents with intermittent chest \"pressure\" off and on for the past 2 days.  The pressure sometimes radiates up into the neck as well.  The patient states that she has been having episodes of this off and on for several months.  She states that she often gets relief with Mylanta but when she took it today it did not help any.  She does have a history of GERD and takes pantoprazole daily.  The patient states that she has been under a lot of increased stress lately.  She notes that her son  a year ago and her son is home bound from school and is suicidal.  Nothing seems to make the pain better or worse.  The patient denies any associated cough or shortness of breath.  No fever.  No palpitations.  No nausea or vomiting.  No diaphoresis.  Past medical history of GERD, hypertension, bipolar disorder, ADHD.  She has had previous hysterectomy.  The patient notes that she underwent a stress test 2 years ago that was normal.  She vapes.  She does not use any alcohol or drugs.        Review of Systems   Constitutional: Negative for chills, diaphoresis, fatigue and fever.   HENT: Negative for sore throat.    Respiratory: Negative for cough and shortness of breath.    Cardiovascular: Positive for chest pain. Negative for palpitations and leg swelling.   Gastrointestinal: Negative for abdominal pain, blood in stool, diarrhea, nausea and vomiting.   Genitourinary: Negative for dysuria.   Musculoskeletal: Positive for back pain (Chest pain sometimes goes into the upper back).   Skin: Negative for rash.   Neurological: Negative for light-headedness and headaches.   Hematological: Negative.    Psychiatric/Behavioral:        History of bipolar disorder and ADHD.       Past Medical History:   Diagnosis Date   • ADHD (attention deficit hyperactivity disorder)    • Asthma    • Bipolar 1 disorder (HCC)    • Hemorrhage    • Herpes    • Hypertension    • " Migraine    • Urinary tract infection        Allergies   Allergen Reactions   • Lamictal [Lamotrigine] Anaphylaxis   • Amlodipine Irritability   • Rocephin [Ceftriaxone] Hives   • Tetracyclines & Related Other (See Comments)     Low h&h       Past Surgical History:   Procedure Laterality Date   • CHOLECYSTECTOMY     • HYSTERECTOMY     • OOPHORECTOMY     • ROTATOR CUFF REPAIR     • ULNAR NERVE REPAIR         Family History   Problem Relation Age of Onset   • Arthritis Mother    • Hypertension Mother    • Thyroid disease Mother    • Thyroid cancer Mother    • Heart disease Father    • Hypertension Father    • Hyperlipidemia Father    • Colon cancer Maternal Grandmother    • Breast cancer Paternal Grandmother    • Diabetes Paternal Grandmother    • Colon cancer Paternal Grandmother    • Other Brother         HYDROCELE   • Hypertension Brother    • Other Maternal Grandfather         pulmonary fibrosis   • Early death Paternal Grandfather    • Other Maternal Uncle         pulmonary fibrosis   • Other Maternal Aunt         pulmonary fibrosis       Social History     Socioeconomic History   • Marital status:    Tobacco Use   • Smoking status: Former     Types: Cigarettes     Start date:      Quit date: 2019     Years since quittin.8   • Smokeless tobacco: Never   • Tobacco comments:     SOCIAL SMOKER   Vaping Use   • Vaping Use: Never used   Substance and Sexual Activity   • Alcohol use: Not Currently   • Drug use: No   • Sexual activity: Defer           Objective   Physical Exam  Constitutional:       General: She is not in acute distress.     Appearance: She is well-developed.   HENT:      Head: Normocephalic.      Nose: Nose normal.      Mouth/Throat:      Mouth: Mucous membranes are moist.   Eyes:      Pupils: Pupils are equal, round, and reactive to light.   Cardiovascular:      Rate and Rhythm: Normal rate.      Pulses: Normal pulses.      Heart sounds: Normal heart sounds. No murmur  "heard.  Pulmonary:      Effort: Pulmonary effort is normal.      Breath sounds: Normal breath sounds. No wheezing, rhonchi or rales.   Chest:      Chest wall: No tenderness.   Abdominal:      General: Bowel sounds are normal.      Tenderness: There is no abdominal tenderness. There is no guarding.   Musculoskeletal:         General: No tenderness. Normal range of motion.      Cervical back: Normal range of motion.      Right lower leg: No edema.      Left lower leg: No edema.   Skin:     General: Skin is warm and dry.   Neurological:      General: No focal deficit present.      Mental Status: She is alert and oriented to person, place, and time.   Psychiatric:         Mood and Affect: Mood normal.         Procedures           ED Course      42-year-old female with intermittent episodes of chest \"pressure\" for months.  Over the past 2 days, her symptoms have been worse than usual and are unrelieved by antacids.  Patient under increased stress.    Labs, EKG and chest x-ray obtained.  Patient was given a GI cocktail to see if this would resolve possible GI etiology.    EKG was read independently by me and shows sinus rhythm with nonspecific T abnormalities anteriorly with a rate of 71.  No acute changes from previous.    I reviewed the patient's prior records and see that she had a treadmill stress test 2 years ago that was normal.  She had an echo with some hypertrophy and an EF of 55.    Two sets of troponins are negative.  No concerning labs.      CXR shows no active disease.      21:39 EDT  Discussed results with pt.  She is feeling better after antiemetics.  I think her symptoms are largely GI related.    Heart score is 3.    I will refer her to the chest pain clinic for follow up.                                                   MDM    Final diagnoses:   Atypical chest pain   Nausea       ED Disposition  ED Disposition     ED Disposition   Discharge    Condition   Stable    Comment   --             Antonette, " Luis RUST MD  [address]  [facility] [place] [facility] [address]  [phone]      call for follow up in office    [facility] CARDIOLOGY  [address]  [facility] [place] [facility] [address] [phone]             Medication List      New Prescriptions    promethazine 25 MG tablet  Commonly known as: PHENERGAN  Take 1 tablet by mouth Every 6 (Six) Hours As Needed for Nausea or Vomiting.        Changed    cloNIDine 0.1 MG tablet  Commonly known as: CATAPRES  Take 1 tablet by mouth 2 (Two) Times a Day.  What changed:   · when to take this  · reasons to take this        Stop    esomeprazole 20 MG capsule  Commonly known as: nexIUM     hydroCHLOROthiazide 25 MG tablet  Commonly known as: HYDRODIURIL     naltrexone 50 MG tablet  Commonly known as: DEPADE     PARoxetine 30 MG tablet  Commonly known as: PAXIL     Vivitrol 380 MG reconstituted suspension IM suspension  Generic drug: Naltrexone           Where to Get Your Medications      These medications were sent to [id number] - [place], [place] - [address] AT [facility] - [phone]  - [phone] FX  [address], [place] [address]    Phone: [phone]   · promethazine 25 MG tablet          Syed Marx PA  10/15/22 2911

## 2022-10-16 LAB
QT INTERVAL: 404 MS
QT INTERVAL: 430 MS
QTC INTERVAL: 432 MS
QTC INTERVAL: 439 MS

## 2022-10-16 NOTE — DISCHARGE INSTRUCTIONS
Rest.  Clear liquids next 12 hrs then slowly advance the diet.  Phenergan as prescribed for nausea.  Follow up with the [facility] [place], operated by [facility] (they should call you for follow up time).  Return to ER if worse.

## 2022-10-25 ENCOUNTER — OFFICE VISIT (OUTPATIENT)
Dept: GASTROENTEROLOGY | Facility: CLINIC | Age: 42
End: 2022-10-25

## 2022-10-25 VITALS
DIASTOLIC BLOOD PRESSURE: 88 MMHG | HEIGHT: 70 IN | OXYGEN SATURATION: 98 % | TEMPERATURE: 97.1 F | WEIGHT: 190.4 LBS | BODY MASS INDEX: 27.26 KG/M2 | SYSTOLIC BLOOD PRESSURE: 134 MMHG | HEART RATE: 67 BPM

## 2022-10-25 DIAGNOSIS — K44.9 HIATAL HERNIA: Primary | ICD-10-CM

## 2022-10-25 DIAGNOSIS — Z83.71 FAMILY HISTORY OF POLYPS IN THE COLON: ICD-10-CM

## 2022-10-25 DIAGNOSIS — K31.84 GASTROPARESIS: ICD-10-CM

## 2022-10-25 DIAGNOSIS — K29.70 GASTRITIS WITHOUT BLEEDING, UNSPECIFIED CHRONICITY, UNSPECIFIED GASTRITIS TYPE: ICD-10-CM

## 2022-10-25 DIAGNOSIS — K21.00 GASTROESOPHAGEAL REFLUX DISEASE WITH ESOPHAGITIS WITHOUT HEMORRHAGE: ICD-10-CM

## 2022-10-25 PROCEDURE — 99214 OFFICE O/P EST MOD 30 MIN: CPT | Performed by: NURSE PRACTITIONER

## 2022-10-25 RX ORDER — DULOXETIN HYDROCHLORIDE 30 MG/1
CAPSULE, DELAYED RELEASE ORAL
COMMUNITY
Start: 2022-10-14

## 2022-10-25 RX ORDER — POTASSIUM CHLORIDE 20 MEQ/1
TABLET, EXTENDED RELEASE ORAL
COMMUNITY
Start: 2022-07-29

## 2022-10-25 RX ORDER — PANTOPRAZOLE SODIUM 40 MG/1
40 TABLET, DELAYED RELEASE ORAL 2 TIMES DAILY
Qty: 180 TABLET | Refills: 3 | Status: SHIPPED | OUTPATIENT
Start: 2022-10-25

## 2022-10-25 RX ORDER — RISPERIDONE 1 MG/1
TABLET ORAL
COMMUNITY
Start: 2022-09-16

## 2022-10-25 RX ORDER — HYDROXYZINE HYDROCHLORIDE 25 MG/1
TABLET, FILM COATED ORAL
COMMUNITY
Start: 2022-10-03

## 2022-10-25 RX ORDER — FLUOXETINE HYDROCHLORIDE 40 MG/1
CAPSULE ORAL
COMMUNITY

## 2022-10-25 RX ORDER — MECLIZINE HYDROCHLORIDE 25 MG/1
TABLET ORAL
COMMUNITY
Start: 2022-07-23

## 2022-10-25 RX ORDER — NALOXONE HYDROCHLORIDE 4 MG/.1ML
SPRAY NASAL
COMMUNITY
Start: 2022-09-29

## 2022-10-25 NOTE — PROGRESS NOTES
"GASTROENTEROLOGY OFFICE NOTE    Bebe Rincon  [id number]  1980    CARE TEAM  Patient Care Team:  Luis Whitman MD as PCP - General (Internal Medicine)    Referring Provider: No ref. provider found    Chief Complaint   Patient presents with   • Follow-up     ER visit on 10/15        HISTORY OF PRESENT ILLNESS:   Bebe Rincon is a 42 y.o. female who presents to the clinic today for [facility] ED follow up. She was evaluated in the emergency department on 10/15/2022 due to intermittent chest pressure radiating to the neck for which she reported relief with use of Mylanta except the day she was in the ER she did not get relief from Mylanta, history of reflux for which she takes pantoprazole daily.  Documentation that after antiemetics she felt better and symptoms seem to be related to GI system.  It was recommended she follow-up with her primary care provider and cardiology.  Promethazine 25 mg every 6 hours as needed prescribed.    She reports she came to this visit prior to scheduling cardiology visit.    She reports approximately 6 months ago she started to experience heartburn despite taking omeprazole twice daily.  Omeprazole was changed to pantoprazole 20 mg twice daily which she continues.  She reports chest pressure that \"doubles me over\" due to pain.  She reports daily pressure in her chest.  She reports chest pressure interferes with her ability to do things.  At times lying down is helpful for chest pressure.  Use of Mylanta previously helpful but prior to ED visit Mylanta was not helpful.  Otherwise she takes Tums as needed.    She reports recent high blood pressure.    She has nausea, a few episodes of vomiting.    She rarely takes ibuprofen.    She does not drink alcohol, she has history of alcoholism and has been sober for 2 years.    She reports increased stress at home related to the death of her son and her 14-year-old daughter not coping well with the death of her brother. "  Her daughter self harms and is suicidal, she is homebound and Ms. Rincon has had to quit her job to care for her daughter.    Most recent office visit 1/2/2019 with Dr. Arroyo due to dysphagia with solids or liquids, nausea with vomiting, reflux, status postcholecystectomy, previously prescribed Carafate and omeprazole without much improvement.  EGD planned for additional evaluation, recommended she continue her proton pump inhibitor.  EGD 1/7/2019 per Dr. Arroyo revealed esophagitis with some ulcerations suggesting reflux, biopsies obtained to evaluate for EOE as there was some subtle furrows and subtle ranging, large amount of retained food in the stomach, overall impression small hiatal hernia, gastritis, esophagitis and ulceration, retained food suggesting gastroparesis with recommendations for more frequent smaller meals with less roughage and less fat, omeprazole 40 mg twice daily.  Biopsy of the esophagus revealed reflux esophagitis.    She feels as though she follows dietary recommendations for gastroparesis.    Review of prior documentation per Dr. Arroyo and discussion with the patient reveals her grandmother was diagnosed with colon cancer at age 36 and she called her mother during the appointment who reported she was diagnosed with polyps at age 47. Ms. Rincon reports it has been years since her last colonoscopy and she would like to know if she should have repeat colonoscopy.     Past Medical History:   Diagnosis Date   • ADHD (attention deficit hyperactivity disorder)    • Asthma    • Bipolar 1 disorder (HCC)    • Hemorrhage    • Herpes    • Hypertension    • Migraine    • Urinary tract infection         Past Surgical History:   Procedure Laterality Date   • CHOLECYSTECTOMY     • COLONOSCOPY      unknown   • HYSTERECTOMY     • OOPHORECTOMY  2015   • ROTATOR CUFF REPAIR     • ULNAR NERVE REPAIR     • UPPER GASTROINTESTINAL ENDOSCOPY  01/07/2019    Dr. Arroyo        Current Outpatient Medications on File  Prior to Visit   Medication Sig   • buPROPion XL (WELLBUTRIN XL) 150 MG 24 hr tablet Take 1 tablet by mouth Daily.   • buPROPion XL (WELLBUTRIN XL) 300 MG 24 hr tablet Take 1 tablet by mouth Daily.   • carvedilol (COREG) 25 MG tablet Take 1 tablet by mouth 2 (Two) Times a Day.   • cloNIDine (CATAPRES) 0.1 MG tablet Take 1 tablet by mouth 2 (Two) Times a Day. (Patient taking differently: Take 1 tablet by mouth 2 (Two) Times a Day As Needed.)   • DULoxetine (CYMBALTA) 30 MG capsule    • FLUoxetine (PROzac) 40 MG capsule    • hydrOXYzine (ATARAX) 25 MG tablet    • ibuprofen (ADVIL,MOTRIN) 800 MG tablet Take 800 mg by mouth Every 6 (Six) Hours As Needed. for pain   • losartan (Cozaar) 50 MG tablet Take 1.5 tablets by mouth Daily.   • meclizine (ANTIVERT) 25 MG tablet    • naloxone (NARCAN) 4 MG/0.1ML nasal spray    • Naltrexone (VIVITROL IM)    • pantoprazole (PROTONIX) 20 MG EC tablet Take 1 tablet by mouth 2 (Two) Times a Day.   • potassium chloride (K-DUR,KLOR-CON) 20 MEQ CR tablet    • prenatal vitamin (prenatal, CLASSIC, vitamin) tablet Take 1 tablet by mouth Daily.   • PROAIR  (90 Base) MCG/ACT inhaler Inhale 2 puffs Every 6 (Six) Hours As Needed.   • promethazine (PHENERGAN) 25 MG tablet Take 1 tablet by mouth Every 6 (Six) Hours As Needed for Nausea or Vomiting.   • risperiDONE (risperDAL) 1 MG tablet    • spironolactone (ALDACTONE) 100 MG tablet Take 1 tablet by mouth 2 (Two) Times a Day.   • Umeclidinium Bromide (INCRUSE ELLIPTA) 62.5 MCG/ACT aerosol powder     • valACYclovir (VALTREX) 1000 MG tablet Take 1,000 mg by mouth Daily.   • topiramate (TOPAMAX) 200 MG tablet    • [DISCONTINUED] FLUoxetine (PROzac) 20 MG capsule Take 1 capsule by mouth 2 (Two) Times a Day.   • [DISCONTINUED] topiramate (TOPAMAX) 25 MG capsule (sprinkle) Take 8 capsules by mouth 2 (Two) Times a Day.   • [DISCONTINUED] traZODone (DESYREL) 50 MG tablet Take 50 mg by mouth Every Night.   • [DISCONTINUED] ziprasidone (GEODON) 40 MG  "capsule Take 1 capsule by mouth Daily.     No current facility-administered medications on file prior to visit.       Allergies   Allergen Reactions   • Lamictal [Lamotrigine] Anaphylaxis   • Amlodipine Irritability   • Rocephin [Ceftriaxone] Hives   • Tetracyclines & Related Other (See Comments)     Low h&h       Family History   Problem Relation Age of Onset   • Arthritis Mother    • Hypertension Mother    • Thyroid disease Mother    • Thyroid cancer Mother    • Heart disease Father    • Hypertension Father    • Hyperlipidemia Father    • Colon cancer Maternal Grandmother    • Breast cancer Paternal Grandmother    • Diabetes Paternal Grandmother    • Colon cancer Paternal Grandmother    • Other Brother         HYDROCELE   • Hypertension Brother    • Other Maternal Grandfather         pulmonary fibrosis   • Early death Paternal Grandfather    • Other Maternal Uncle         pulmonary fibrosis   • Other Maternal Aunt         pulmonary fibrosis       Social History     Socioeconomic History   • Marital status:    Tobacco Use   • Smoking status: Former     Types: Cigarettes     Start date:      Quit date: 2019     Years since quittin.9   • Smokeless tobacco: Never   • Tobacco comments:     SOCIAL SMOKER   Vaping Use   • Vaping Use: Never used   Substance and Sexual Activity   • Alcohol use: Not Currently   • Drug use: Not Currently     Comment: 2 years sober   • Sexual activity: Defer       PHYSICAL EXAM   /88 (BP Location: Left arm, Patient Position: Sitting, Cuff Size: Adult)   Pulse 67   Temp 97.1 °F (36.2 °C) (Infrared)   Ht 177.8 cm (70\")   Wt 86.4 kg (190 lb 6.4 oz)   LMP  (LMP Unknown) Comment: NO PAP SINCE HYSTERECTOMY  SpO2 98%   BMI 27.32 kg/m²   Physical Exam  Constitutional:       General: She is not in acute distress.     Appearance: She is not toxic-appearing.   HENT:      Head: Normocephalic and atraumatic. No contusion.      Right Ear: External ear normal.      Left Ear: " External ear normal.   Eyes:      General: Lids are normal. No scleral icterus.        Right eye: No discharge.         Left eye: No discharge.      Extraocular Movements: Extraocular movements intact.   Neck:      Trachea: Trachea normal.      Comments: No visible mass  No visible adenopathy  Cardiovascular:      Rate and Rhythm: Normal rate.   Pulmonary:      Effort: No respiratory distress.      Comments: Symmetrical expansion    Musculoskeletal:      Comments: Symmetrical movement of upper extremities  Symmetrical movement of lower extremities  No visible deformities   Skin:     General: Skin is warm and dry.      Coloration: Skin is not jaundiced.   Neurological:      General: No focal deficit present.      Mental Status: She is alert and oriented to person, place, and time.   Psychiatric:         Mood and Affect: Mood normal.         Behavior: Behavior normal.         Thought Content: Thought content normal.     Results Review:    EGD 1/7/2019 per Dr. Arroyo revealed esophagitis with some ulcerations suggesting reflux, biopsies obtained to evaluate for EOE as there was some subtle furrows and subtle ranging, large amount of retained food in the stomach, overall impression small hiatal hernia, gastritis, esophagitis and ulceration, retained food suggesting gastroparesis with recommendations for more frequent smaller meals with less roughage and less fat, omeprazole 40 mg twice daily.  Biopsy of the esophagus revealed reflux esophagitis.    CMP    CMP 10/15/22   Glucose 83   BUN 16   Creatinine 1.17 (A)   Sodium 138   Potassium 3.7   Chloride 108 (A)   Calcium 8.8   Albumin 4.00   Total Bilirubin 0.2   Alkaline Phosphatase 81   AST (SGOT) 13   ALT (SGPT) 12   (A) Abnormal value            CBC    CBC 10/15/22   WBC 8.30   RBC 4.08   Hemoglobin 13.0   Hematocrit 38.7   MCV 94.9   MCH 31.9   MCHC 33.6   RDW 13.0   Platelets 283             ASSESSMENT / PLAN  1. Hiatal hernia  2. Gastroesophageal reflux disease with  esophagitis without hemorrhage  3. Gastritis without bleeding, unspecified chronicity, unspecified gastritis type  -Lifestyle modifications regarding gastroparesis printed and provided in the office as gastroparesis may contribute to reflux  -Continue to limit use of NSAIDs such as ibuprofen  -She does not drink alcohol  -She has history of taking omeprazole twice daily, omeprazole was changed to pantoprazole due to increased heartburn approximately 6 months ago, recommend she increase pantoprazole to 40 mg twice daily  -Plan for repeat EGD for additional evaluation  -Recommend evaluation by cardiology  -Consider Gaviscon at bedtime for a few weeks and then as needed  - pantoprazole (PROTONIX) 40 MG EC tablet; Take 1 tablet by mouth 2 (Two) Times a Day. 30 minutes prior to first meal and 30 minutes prior to last meal of the day  Dispense: 180 tablet; Refill: 3  4. Gastroparesis  -Printed information regarding lifestyle modifications for gastroparesis provided in the office, it seems as though she is likely following lifestyle recommendations but recommend she review to determine if there may be room for improvement  -Gastroparesis could be contributing to possible reflux    5. Family history of polyps in the colon  - plan for colonoscopy due to mother's history of polyps at age 46 and grandmother being diagnosed with colon cancer at age 36    Return for Follow-up after EGD.    Dinorah Ocampo, APRN  10/25/2022

## 2022-11-29 RX ORDER — SODIUM, POTASSIUM,MAG SULFATES 17.5-3.13G
SOLUTION, RECONSTITUTED, ORAL ORAL
Qty: 354 ML | Refills: 0 | Status: SHIPPED | OUTPATIENT
Start: 2022-11-29 | End: 2023-01-31 | Stop reason: SDUPTHER

## 2022-11-30 ENCOUNTER — APPOINTMENT (OUTPATIENT)
Dept: GENERAL RADIOLOGY | Facility: HOSPITAL | Age: 42
End: 2022-11-30

## 2022-11-30 ENCOUNTER — HOSPITAL ENCOUNTER (EMERGENCY)
Facility: HOSPITAL | Age: 42
Discharge: HOME | End: 2022-11-30
Attending: EMERGENCY MEDICINE | Admitting: EMERGENCY MEDICINE

## 2022-11-30 VITALS
WEIGHT: 187 LBS | SYSTOLIC BLOOD PRESSURE: 143 MMHG | HEIGHT: 70 IN | HEART RATE: 72 BPM | BODY MASS INDEX: 26.77 KG/M2 | RESPIRATION RATE: 16 BRPM | OXYGEN SATURATION: 100 % | TEMPERATURE: 97.9 F | DIASTOLIC BLOOD PRESSURE: 106 MMHG

## 2022-11-30 DIAGNOSIS — M54.41 ACUTE MIDLINE LOW BACK PAIN WITH RIGHT-SIDED SCIATICA: Primary | ICD-10-CM

## 2022-11-30 DIAGNOSIS — S80.01XA CONTUSION OF RIGHT KNEE, INITIAL ENCOUNTER: ICD-10-CM

## 2022-11-30 DIAGNOSIS — M25.561 ACUTE PAIN OF RIGHT KNEE: ICD-10-CM

## 2022-11-30 PROCEDURE — 99283 EMERGENCY DEPT VISIT LOW MDM: CPT

## 2022-11-30 PROCEDURE — 72100 X-RAY EXAM L-S SPINE 2/3 VWS: CPT

## 2022-11-30 PROCEDURE — 73560 X-RAY EXAM OF KNEE 1 OR 2: CPT

## 2022-11-30 RX ORDER — METHYLPREDNISOLONE 4 MG/1
TABLET ORAL
Qty: 21 TABLET | Refills: 0 | Status: SHIPPED | OUTPATIENT
Start: 2022-11-30

## 2022-11-30 RX ORDER — HYDROCODONE BITARTRATE AND ACETAMINOPHEN 7.5; 325 MG/1; MG/1
1 TABLET ORAL ONCE
Status: COMPLETED | OUTPATIENT
Start: 2022-11-30 | End: 2022-11-30

## 2022-11-30 RX ORDER — CYCLOBENZAPRINE HCL 10 MG
10 TABLET ORAL 3 TIMES DAILY PRN
Qty: 12 TABLET | Refills: 0 | Status: SHIPPED | OUTPATIENT
Start: 2022-11-30 | End: 2022-11-30 | Stop reason: SDUPTHER

## 2022-11-30 RX ORDER — METHYLPREDNISOLONE 4 MG/1
TABLET ORAL
Qty: 21 TABLET | Refills: 0 | Status: SHIPPED | OUTPATIENT
Start: 2022-11-30 | End: 2022-11-30 | Stop reason: SDUPTHER

## 2022-11-30 RX ORDER — CYCLOBENZAPRINE HCL 10 MG
10 TABLET ORAL 3 TIMES DAILY PRN
Qty: 12 TABLET | Refills: 0 | Status: SHIPPED | OUTPATIENT
Start: 2022-11-30

## 2022-11-30 RX ADMIN — HYDROCODONE BITARTRATE AND ACETAMINOPHEN 1 TABLET: 7.5; 325 TABLET ORAL at 21:05

## 2022-12-01 NOTE — ED PROVIDER NOTES
Subjective   History of Present Illness  42-year-old female presents emergency department today complaining of having a fall a week ago and complaining of back pain right hip pain right knee pain.  She reports the pain in her back and her right hip and her back is gotten worse.  She is able to ambulate but the right leg feels weak.  She had no loss of bowel or bladder control.  She denies any dysuria frequency urgency or hematuria.  No abdominal pain.    History provided by:  Patient   used: No    Fall  Mechanism of injury: fall    Injury location:  Pelvis, torso and leg  Torso injury location:  Back  Leg injury location:  R knee  Incident location:  Home  Time since incident:  7 days  Arrived directly from scene: no    Associated symptoms: back pain        Review of Systems   Constitutional: Negative for chills and fever.   Respiratory: Negative for shortness of breath and wheezing.    Genitourinary: Negative for enuresis, frequency and urgency.   Musculoskeletal: Positive for back pain.   Skin: Negative for pallor and rash.   Psychiatric/Behavioral: Negative.    All other systems reviewed and are negative.      Past Medical History:   Diagnosis Date   • ADHD (attention deficit hyperactivity disorder)    • Asthma    • Bipolar 1 disorder (HCC)    • Hemorrhage    • Herpes    • Hypertension    • Migraine    • Urinary tract infection        Allergies   Allergen Reactions   • Lamictal [Lamotrigine] Anaphylaxis   • Amlodipine Irritability   • Rocephin [Ceftriaxone] Hives   • Tetracyclines & Related Other (See Comments)     Low h&h       Past Surgical History:   Procedure Laterality Date   • CHOLECYSTECTOMY     • COLONOSCOPY      unknown   • HYSTERECTOMY     • OOPHORECTOMY  2015   • ROTATOR CUFF REPAIR     • ULNAR NERVE REPAIR     • UPPER GASTROINTESTINAL ENDOSCOPY  01/07/2019    Dr. Arroyo       Family History   Problem Relation Age of Onset   • Arthritis Mother    • Hypertension Mother    • Thyroid  disease Mother    • Thyroid cancer Mother    • Heart disease Father    • Hypertension Father    • Hyperlipidemia Father    • Colon cancer Maternal Grandmother    • Breast cancer Paternal Grandmother    • Diabetes Paternal Grandmother    • Colon cancer Paternal Grandmother    • Other Brother         HYDROCELE   • Hypertension Brother    • Other Maternal Grandfather         pulmonary fibrosis   • Early death Paternal Grandfather    • Other Maternal Uncle         pulmonary fibrosis   • Other Maternal Aunt         pulmonary fibrosis       Social History     Socioeconomic History   • Marital status:    Tobacco Use   • Smoking status: Former     Types: Cigarettes     Start date: 1998     Quit date: 12/2019     Years since quitting: 3.0   • Smokeless tobacco: Never   • Tobacco comments:     SOCIAL SMOKER   Vaping Use   • Vaping Use: Never used   Substance and Sexual Activity   • Alcohol use: Not Currently   • 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   Constitutional:       Appearance: She is well-developed.   HENT:      Head: Normocephalic and atraumatic.      Right Ear: External ear normal.      Left Ear: External ear normal.      Nose: Nose normal.   Eyes:      General: No scleral icterus.     Conjunctiva/sclera: Conjunctivae normal.   Neck:      Thyroid: No thyromegaly.   Pulmonary:      Effort: Pulmonary effort is normal. No respiratory distress.   Abdominal:      General: Bowel sounds are normal. There is no distension.      Palpations: Abdomen is soft.      Tenderness: There is no abdominal tenderness.   Musculoskeletal:         General: Normal range of motion.      Cervical back: Normal range of motion.   Lymphadenopathy:      Cervical: No cervical adenopathy.   Skin:     General: Skin is warm and dry.   Neurological:      Mental Status: She is alert and oriented to person, place, and time.       "Cranial Nerves: No cranial nerve deficit.      Coordination: Coordination normal.      Deep Tendon Reflexes: Reflexes are normal and symmetric. Reflexes normal.   Psychiatric:         Behavior: Behavior normal.         Thought Content: Thought content normal.         Judgment: Judgment normal.         Procedures           ED Course                                 No results found for this or any previous visit (from the past 24 hour(s)).  Note: In addition to lab results from this visit, the labs listed above may include labs taken at another facility or during a different encounter within the last 24 hours. Please correlate lab times with ED admission and discharge times for further clarification of the services performed during this visit.    XR Spine Lumbar 2 or 3 View   Final Result   Vertebral body heights are maintained without evidence of acute fracture   and alignment is anatomic without evidence of listhesis or subluxation.   Mild spondylosis change is apparent at L5-S1, with some mild facet   arthropathy present. The SI joints are symmetric.        This report was finalized on 11/30/2022 7:56 PM by Brady Browning.          XR Knee 1 or 2 View Right   Final Result   Knee joint DJD but no evidence of acute trauma.       This report was finalized on 11/30/2022 5:41 PM by Dr. Mathew Burnette MD.            Vitals:    11/30/22 1649   BP: (!) 143/106   BP Location: Left arm   Patient Position: Sitting   Pulse: 72   Resp: 16   Temp: 97.9 °F (36.6 °C)   TempSrc: Oral   SpO2: 100%   Weight: 84.8 kg (187 lb)   Height: 177.8 cm (70\")     Medications   HYDROcodone-acetaminophen (NORCO) 7.5-325 MG per tablet 1 tablet (1 tablet Oral Given 11/30/22 2105)     ECG/EMG Results (last 24 hours)     ** No results found for the last 24 hours. **        No orders to display                 MDM  Number of Diagnoses or Management Options  Acute midline low back pain with right-sided sciatica: new and requires workup  Acute pain of " right knee: new and requires workup  Contusion of right knee, initial encounter: new and requires workup     Amount and/or Complexity of Data Reviewed  Tests in the radiology section of CPT®: reviewed and ordered  Discuss the patient with other providers: yes    Patient Progress  Patient progress: stable      Final diagnoses:   Acute midline low back pain with right-sided sciatica   Acute pain of right knee   Contusion of right knee, initial encounter       ED Disposition  ED Disposition     ED Disposition   Discharge    Condition   Stable    Comment   --             Pedro Luis Lopez MD  [address]  [place] [address]  [phone]      Call for appointment    Tracie Meraz, APRN  [address]   [address]  [place] [address]  [phone]               Medication List      New Prescriptions    cyclobenzaprine 10 MG tablet  Commonly known as: FLEXERIL  Take 1 tablet by mouth 3 (Three) Times a Day As Needed for Muscle Spasms.     methylPREDNISolone 4 MG dose pack  Commonly known as: MEDROL  Take as directed on package instructions.        Changed    cloNIDine 0.1 MG tablet  Commonly known as: CATAPRES  Take 1 tablet by mouth 2 (Two) Times a Day.  What changed:   · when to take this  · reasons to take this           Where to Get Your Medications      These medications were sent to [facility]/pharmacy #[id number] - [place], [place] - [address] [facility] [address] [phone] [place] [phone]   [address] [place] [address]    Hours: 24-hours Phone: [phone]   · cyclobenzaprine 10 MG tablet  · methylPREDNISolone 4 MG dose pack          Shadi Fitch PA  12/04/22 1620       Shadi Fitch PA  12/27/22 0851       Shadi Fitch PA  12/27/22 0864

## 2023-01-31 RX ORDER — SODIUM, POTASSIUM,MAG SULFATES 17.5-3.13G
SOLUTION, RECONSTITUTED, ORAL ORAL
Qty: 354 ML | Refills: 0 | Status: SHIPPED | OUTPATIENT
Start: 2023-01-31

## 2024-08-28 ENCOUNTER — APPOINTMENT (OUTPATIENT)
Dept: GENERAL RADIOLOGY | Facility: HOSPITAL | Age: 44
End: 2024-08-28
Payer: MEDICARE

## 2024-08-28 ENCOUNTER — HOSPITAL ENCOUNTER (EMERGENCY)
Facility: HOSPITAL | Age: 44
Discharge: HOME | End: 2024-08-28
Attending: EMERGENCY MEDICINE
Payer: MEDICARE

## 2024-08-28 VITALS
WEIGHT: 210 LBS | DIASTOLIC BLOOD PRESSURE: 100 MMHG | BODY MASS INDEX: 30.06 KG/M2 | SYSTOLIC BLOOD PRESSURE: 141 MMHG | TEMPERATURE: 98.1 F | HEART RATE: 79 BPM | RESPIRATION RATE: 20 BRPM | OXYGEN SATURATION: 97 % | HEIGHT: 70 IN

## 2024-08-28 DIAGNOSIS — R07.9 CHEST PAIN IN ADULT: ICD-10-CM

## 2024-08-28 DIAGNOSIS — U07.1 COVID-19: Primary | ICD-10-CM

## 2024-08-28 DIAGNOSIS — G44.201 ACUTE INTRACTABLE TENSION-TYPE HEADACHE: ICD-10-CM

## 2024-08-28 LAB
ALBUMIN SERPL-MCNC: 3.7 G/DL (ref 3.5–5.2)
ALBUMIN/GLOB SERPL: 1.3 G/DL
ALP SERPL-CCNC: 85 U/L (ref 39–117)
ALT SERPL W P-5'-P-CCNC: 21 U/L (ref 1–33)
ANION GAP SERPL CALCULATED.3IONS-SCNC: 12 MMOL/L (ref 5–15)
AST SERPL-CCNC: 23 U/L (ref 1–32)
BASOPHILS # BLD AUTO: 0.06 10*3/MM3 (ref 0–0.2)
BASOPHILS NFR BLD AUTO: 1 % (ref 0–1.5)
BILIRUB SERPL-MCNC: 0.2 MG/DL (ref 0–1.2)
BUN SERPL-MCNC: 17 MG/DL (ref 6–20)
BUN/CREAT SERPL: 18.7 (ref 7–25)
CALCIUM SPEC-SCNC: 8.6 MG/DL (ref 8.6–10.5)
CHLORIDE SERPL-SCNC: 108 MMOL/L (ref 98–107)
CO2 SERPL-SCNC: 21 MMOL/L (ref 22–29)
CREAT SERPL-MCNC: 0.91 MG/DL (ref 0.57–1)
DEPRECATED RDW RBC AUTO: 45.7 FL (ref 37–54)
EGFRCR SERPLBLD CKD-EPI 2021: 80.4 ML/MIN/1.73
EOSINOPHIL # BLD AUTO: 0.41 10*3/MM3 (ref 0–0.4)
EOSINOPHIL NFR BLD AUTO: 6.7 % (ref 0.3–6.2)
ERYTHROCYTE [DISTWIDTH] IN BLOOD BY AUTOMATED COUNT: 13.4 % (ref 12.3–15.4)
GLOBULIN UR ELPH-MCNC: 2.9 GM/DL
GLUCOSE SERPL-MCNC: 93 MG/DL (ref 65–99)
HCT VFR BLD AUTO: 36.7 % (ref 34–46.6)
HGB BLD-MCNC: 11.8 G/DL (ref 12–15.9)
HOLD SPECIMEN: NORMAL
IMM GRANULOCYTES # BLD AUTO: 0.02 10*3/MM3 (ref 0–0.05)
IMM GRANULOCYTES NFR BLD AUTO: 0.3 % (ref 0–0.5)
LYMPHOCYTES # BLD AUTO: 2.29 10*3/MM3 (ref 0.7–3.1)
LYMPHOCYTES NFR BLD AUTO: 37.6 % (ref 19.6–45.3)
MCH RBC QN AUTO: 29.9 PG (ref 26.6–33)
MCHC RBC AUTO-ENTMCNC: 32.2 G/DL (ref 31.5–35.7)
MCV RBC AUTO: 92.9 FL (ref 79–97)
MONOCYTES # BLD AUTO: 0.6 10*3/MM3 (ref 0.1–0.9)
MONOCYTES NFR BLD AUTO: 9.9 % (ref 5–12)
NEUTROPHILS NFR BLD AUTO: 2.71 10*3/MM3 (ref 1.7–7)
NEUTROPHILS NFR BLD AUTO: 44.5 % (ref 42.7–76)
NRBC BLD AUTO-RTO: 0 /100 WBC (ref 0–0.2)
NT-PROBNP SERPL-MCNC: 172.5 PG/ML (ref 0–450)
PLAT MORPH BLD: NORMAL
PLATELET # BLD AUTO: 283 10*3/MM3 (ref 140–450)
PMV BLD AUTO: 9.7 FL (ref 6–12)
POTASSIUM SERPL-SCNC: 4.2 MMOL/L (ref 3.5–5.2)
PROT SERPL-MCNC: 6.6 G/DL (ref 6–8.5)
QT INTERVAL: 364 MS
QTC INTERVAL: 447 MS
RBC # BLD AUTO: 3.95 10*6/MM3 (ref 3.77–5.28)
RBC MORPH BLD: NORMAL
SODIUM SERPL-SCNC: 141 MMOL/L (ref 136–145)
TROPONIN T SERPL HS-MCNC: <6 NG/L
WBC MORPH BLD: NORMAL
WBC NRBC COR # BLD AUTO: 6.09 10*3/MM3 (ref 3.4–10.8)
WHOLE BLOOD HOLD COAG: NORMAL
WHOLE BLOOD HOLD SPECIMEN: NORMAL

## 2024-08-28 PROCEDURE — 96374 THER/PROPH/DIAG INJ IV PUSH: CPT

## 2024-08-28 PROCEDURE — 83880 ASSAY OF NATRIURETIC PEPTIDE: CPT | Performed by: EMERGENCY MEDICINE

## 2024-08-28 PROCEDURE — 25810000003 LACTATED RINGERS SOLUTION: Performed by: EMERGENCY MEDICINE

## 2024-08-28 PROCEDURE — 71045 X-RAY EXAM CHEST 1 VIEW: CPT

## 2024-08-28 PROCEDURE — 99284 EMERGENCY DEPT VISIT MOD MDM: CPT

## 2024-08-28 PROCEDURE — 84484 ASSAY OF TROPONIN QUANT: CPT | Performed by: EMERGENCY MEDICINE

## 2024-08-28 PROCEDURE — 80053 COMPREHEN METABOLIC PANEL: CPT | Performed by: EMERGENCY MEDICINE

## 2024-08-28 PROCEDURE — 63710000001 DEXAMETHASONE PER 0.25 MG: Performed by: EMERGENCY MEDICINE

## 2024-08-28 PROCEDURE — 93005 ELECTROCARDIOGRAM TRACING: CPT | Performed by: EMERGENCY MEDICINE

## 2024-08-28 PROCEDURE — 85007 BL SMEAR W/DIFF WBC COUNT: CPT | Performed by: EMERGENCY MEDICINE

## 2024-08-28 PROCEDURE — 93005 ELECTROCARDIOGRAM TRACING: CPT

## 2024-08-28 PROCEDURE — 25010000002 KETOROLAC TROMETHAMINE PER 15 MG: Performed by: EMERGENCY MEDICINE

## 2024-08-28 PROCEDURE — 85025 COMPLETE CBC W/AUTO DIFF WBC: CPT | Performed by: EMERGENCY MEDICINE

## 2024-08-28 RX ORDER — KETOROLAC TROMETHAMINE 15 MG/ML
15 INJECTION, SOLUTION INTRAMUSCULAR; INTRAVENOUS ONCE
Status: COMPLETED | OUTPATIENT
Start: 2024-08-28 | End: 2024-08-28

## 2024-08-28 RX ORDER — SODIUM CHLORIDE 0.9 % (FLUSH) 0.9 %
10 SYRINGE (ML) INJECTION AS NEEDED
Status: DISCONTINUED | OUTPATIENT
Start: 2024-08-28 | End: 2024-08-28 | Stop reason: HOSPADM

## 2024-08-28 RX ADMIN — KETOROLAC TROMETHAMINE 15 MG: 15 INJECTION, SOLUTION INTRAMUSCULAR; INTRAVENOUS at 03:53

## 2024-08-28 RX ADMIN — SODIUM CHLORIDE, POTASSIUM CHLORIDE, SODIUM LACTATE AND CALCIUM CHLORIDE 1000 ML: 600; 310; 30; 20 INJECTION, SOLUTION INTRAVENOUS at 03:53

## 2024-08-28 RX ADMIN — DEXAMETHASONE 6 MG: 2 TABLET ORAL at 05:07

## 2024-08-28 NOTE — ED PROVIDER NOTES
Subjective   History of Present Illness  Patient presents for evaluation of symptoms including headache, dizziness described as lightheadedness, chest tightness and pain that radiates to her bilateral shoulders from her upper chest.  Patient states symptoms started about 2 weeks ago when she was diagnosed with a COVID-19 infection.  She seemed to get better but then got worse.  She has not had any objective fevers.  She also noticed that her blood pressure has been running higher than usual despite taking her normally prescribed lisinopril and metoprolol.    History provided by:  Patient      Review of Systems    Past Medical History:   Diagnosis Date    ADHD (attention deficit hyperactivity disorder)     Asthma     Bipolar 1 disorder     Hemorrhage     Herpes     Hypertension     Migraine     Urinary tract infection        Allergies   Allergen Reactions    Lamictal [Lamotrigine] Anaphylaxis    Amlodipine Irritability    Rocephin [Ceftriaxone] Hives    Tetracyclines & Related Other (See Comments)     Low h&h       Past Surgical History:   Procedure Laterality Date    CHOLECYSTECTOMY      COLONOSCOPY      unknown    HYSTERECTOMY      OOPHORECTOMY  2015    ROTATOR CUFF REPAIR      ULNAR NERVE REPAIR      UPPER GASTROINTESTINAL ENDOSCOPY  01/07/2019    Dr. Arroyo       Family History   Problem Relation Age of Onset    Arthritis Mother     Hypertension Mother     Thyroid disease Mother     Thyroid cancer Mother     Heart disease Father     Hypertension Father     Hyperlipidemia Father     Colon cancer Maternal Grandmother     Breast cancer Paternal Grandmother     Diabetes Paternal Grandmother     Colon cancer Paternal Grandmother     Other Brother         HYDROCELE    Hypertension Brother     Other Maternal Grandfather         pulmonary fibrosis    Early death Paternal Grandfather     Other Maternal Uncle         pulmonary fibrosis    Other Maternal Aunt         pulmonary fibrosis       Social History     Socioeconomic  History    Marital status:    Tobacco Use    Smoking status: Former     Current packs/day: 0.00     Types: Cigarettes     Start date:      Quit date: 2019     Years since quittin.7    Smokeless tobacco: Never    Tobacco comments:     SOCIAL SMOKER   Vaping Use    Vaping status: Never Used   Substance and Sexual Activity    Alcohol use: Not Currently    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  HENT:      Head: Normocephalic and atraumatic.   Eyes:      Conjunctiva/sclera: Conjunctivae normal.      Pupils: Pupils are equal, round, and reactive to light.   Cardiovascular:      Rate and Rhythm: Normal rate and regular rhythm.      Pulses: Normal pulses.      Heart sounds: No murmur heard.     No gallop.   Pulmonary:      Effort: Pulmonary effort is normal. No respiratory distress.      Breath sounds: No wheezing or rales.   Abdominal:      General: Abdomen is flat. There is no distension.      Tenderness: There is no abdominal tenderness.   Musculoskeletal:         General: No swelling or deformity. Normal range of motion.   Skin:     General: Skin is warm and dry.      Capillary Refill: Capillary refill takes less than 2 seconds.   Neurological:      General: No focal deficit present.      Mental Status: She is alert and oriented to person, place, and time.   Psychiatric:         Mood and Affect: Mood normal.         Behavior: Behavior normal.         Procedures           ED Course  ED Course as of 24 0553   Wed Aug 28, 2024   0324 Twelve-lead ECG independently interpreted by myself demonstrates normal sinus rhythm, no ST segment elevation or depression.  Normal axis [KB]   0552 Laboratory workup independently interpreted by myself demonstrates no actionable abnormalities [KB]   0552 Chest x-ray independently interpreted by myself demonstrates no acute  cardiopulmonary abnormality [KB]      ED Course User Index  [KB] Delmer Hamlin MD                HEART Score: 1                              Medical Decision Making  Patient signs and symptoms are consistent with a COVID-19 infection, that symptoms improved and then worsened and would raise suspicion for a secondary pneumonia as well.  Given patient's chest pain always consider acute coronary syndrome or pulmonary embolism though these are felt to be less likely.  In regards to pulmonary embolism patient is Wells low risk and PERC negative.  Lab and imaging studies were conducted.  1 L IV fluid bolus and 15 mg IV Toradol was given for symptom relief.    Given the timeframe of patient's symptoms I believe a single troponin is adequate to rule out acute myocardial ischemia.    Ultimately patient has some improvement in her symptoms in the emergency room, maintains normal vital signs except some elevated blood pressures which are downtrending towards normal.  I believe patient is appropriate for outpatient management at this time, will follow-up with her primary doctor, counseled on return precautions to the ER and discharged in good condition    Problems Addressed:  Acute intractable tension-type headache: complicated acute illness or injury  Chest pain in adult: complicated acute illness or injury  COVID-19: complicated acute illness or injury with systemic symptoms    Amount and/or Complexity of Data Reviewed  External Data Reviewed: notes.     Details: 11/30/2022 reviewed most recent ER visit where patient was treated for injuries from a fall  Labs: ordered. Decision-making details documented in ED Course.  Radiology: ordered and independent interpretation performed. Decision-making details documented in ED Course.  ECG/medicine tests: ordered and independent interpretation performed. Decision-making details documented in ED Course.    Risk  OTC drugs.  Prescription drug management.  Decision regarding  hospitalization.        Final diagnoses:   COVID-19   Chest pain in adult   Acute intractable tension-type headache       ED Disposition  ED Disposition       ED Disposition   Discharge    Condition   Stable    Comment   --             Recent Results (from the past 24 hour(s))   ECG 12 Lead ED Triage Standing Order; SOA    Collection Time: 08/28/24  3:21 AM   Result Value Ref Range    QT Interval 364 ms    QTC Interval 447 ms   Comprehensive Metabolic Panel    Collection Time: 08/28/24  3:52 AM    Specimen: Blood   Result Value Ref Range    Glucose 93 65 - 99 mg/dL    BUN 17 6 - 20 mg/dL    Creatinine 0.91 0.57 - 1.00 mg/dL    Sodium 141 136 - 145 mmol/L    Potassium 4.2 3.5 - 5.2 mmol/L    Chloride 108 (H) 98 - 107 mmol/L    CO2 21.0 (L) 22.0 - 29.0 mmol/L    Calcium 8.6 8.6 - 10.5 mg/dL    Total Protein 6.6 6.0 - 8.5 g/dL    Albumin 3.7 3.5 - 5.2 g/dL    ALT (SGPT) 21 1 - 33 U/L    AST (SGOT) 23 1 - 32 U/L    Alkaline Phosphatase 85 39 - 117 U/L    Total Bilirubin 0.2 0.0 - 1.2 mg/dL    Globulin 2.9 gm/dL    A/G Ratio 1.3 g/dL    BUN/Creatinine Ratio 18.7 7.0 - 25.0    Anion Gap 12.0 5.0 - 15.0 mmol/L    eGFR 80.4 >60.0 mL/min/1.73   BNP    Collection Time: 08/28/24  3:52 AM    Specimen: Blood   Result Value Ref Range    proBNP 172.5 0.0 - 450.0 pg/mL   Single High Sensitivity Troponin T    Collection Time: 08/28/24  3:52 AM    Specimen: Blood   Result Value Ref Range    HS Troponin T <6 <14 ng/L   Green Top (Gel)    Collection Time: 08/28/24  3:52 AM   Result Value Ref Range    Extra Tube Hold for add-ons.    Lavender Top    Collection Time: 08/28/24  3:52 AM   Result Value Ref Range    Extra Tube hold for add-on    Gold Top - SST    Collection Time: 08/28/24  3:52 AM   Result Value Ref Range    Extra Tube Hold for add-ons.    Gray Top    Collection Time: 08/28/24  3:52 AM   Result Value Ref Range    Extra Tube Hold for add-ons.    Light Blue Top    Collection Time: 08/28/24  3:52 AM   Result Value Ref Range     Extra Tube Hold for add-ons.    CBC Auto Differential    Collection Time: 08/28/24  3:52 AM    Specimen: Blood   Result Value Ref Range    WBC 6.09 3.40 - 10.80 10*3/mm3    RBC 3.95 3.77 - 5.28 10*6/mm3    Hemoglobin 11.8 (L) 12.0 - 15.9 g/dL    Hematocrit 36.7 34.0 - 46.6 %    MCV 92.9 79.0 - 97.0 fL    MCH 29.9 26.6 - 33.0 pg    MCHC 32.2 31.5 - 35.7 g/dL    RDW 13.4 12.3 - 15.4 %    RDW-SD 45.7 37.0 - 54.0 fl    MPV 9.7 6.0 - 12.0 fL    Platelets 283 140 - 450 10*3/mm3    Neutrophil % 44.5 42.7 - 76.0 %    Lymphocyte % 37.6 19.6 - 45.3 %    Monocyte % 9.9 5.0 - 12.0 %    Eosinophil % 6.7 (H) 0.3 - 6.2 %    Basophil % 1.0 0.0 - 1.5 %    Immature Grans % 0.3 0.0 - 0.5 %    Neutrophils, Absolute 2.71 1.70 - 7.00 10*3/mm3    Lymphocytes, Absolute 2.29 0.70 - 3.10 10*3/mm3    Monocytes, Absolute 0.60 0.10 - 0.90 10*3/mm3    Eosinophils, Absolute 0.41 (H) 0.00 - 0.40 10*3/mm3    Basophils, Absolute 0.06 0.00 - 0.20 10*3/mm3    Immature Grans, Absolute 0.02 0.00 - 0.05 10*3/mm3    nRBC 0.0 0.0 - 0.2 /100 WBC   Scan Slide    Collection Time: 08/28/24  3:52 AM    Specimen: Blood   Result Value Ref Range    RBC Morphology Normal Normal    WBC Morphology Normal Normal    Platelet Morphology Normal Normal     Note: In addition to lab results from this visit, the labs listed above may include labs taken at another facility or during a different encounter within the last 24 hours. Please correlate lab times with ED admission and discharge times for further clarification of the services performed during this visit.    XR Chest 1 View   Final Result   Impression:   No acute cardiopulmonary process.         Electronically Signed: Donna Raza MD     8/28/2024 3:46 AM EDT     Workstation ID: [id number]        Vitals:    08/28/24 0345 08/28/24 0400 08/28/24 0415 08/28/24 0500   BP: (!) 162/105 (!) 168/108 (!) 161/106 141/100   BP Location:       Patient Position:       Pulse:  93 85 79   Resp:       Temp:       TempSrc:       SpO2:   97% 98% 97%   Weight:       Height:         Medications   sodium chloride 0.9 % flush 10 mL (has no administration in time range)   lactated ringers bolus 1,000 mL (0 mL Intravenous Stopped 8/28/24 0512)   ketorolac (TORADOL) injection 15 mg (15 mg Intravenous Given 8/28/24 7573)   dexAMETHasone (DECADRON) tablet 6 mg (6 mg Oral Given 8/28/24 8167)     ECG/EMG Results (last 24 hours)       Procedure Component Value Units Date/Time    ECG 12 Lead ED Triage Standing Order; SOA [[id number]] Collected: 08/28/24 0321     Updated: 08/28/24 0324     QT Interval 364 ms      QTC Interval 447 ms     Narrative:      Test Reason : ED Triage Standing Order~  Blood Pressure :   */*   mmHG  Vent. Rate :  91 BPM     Atrial Rate :  91 BPM     P-R Int : 158 ms          QRS Dur :  84 ms      QT Int : 364 ms       P-R-T Axes :  47   3  44 degrees     QTc Int : 447 ms    Normal sinus rhythm  Low voltage QRS  Cannot rule out Anterior infarct (cited on or before 14-DEC-2018)  Abnormal ECG  When compared with ECG of 15-OCT-2022 20:12,  Vent. rate has increased BY  30 BPM  Confirmed by MD HAYDEN KYLE ([id number]) on 8/28/2024 3:24:28 AM    Referred By: JAD           Confirmed By: JAMES HAYDEN MD          ECG 12 Lead ED Triage Standing Order; SOA   Final Result   Test Reason : ED Triage Standing Order~   Blood Pressure :   */*   mmHG   Vent. Rate :  91 BPM     Atrial Rate :  91 BPM      P-R Int : 158 ms          QRS Dur :  84 ms       QT Int : 364 ms       P-R-T Axes :  47   3  44 degrees      QTc Int : 447 ms      Normal sinus rhythm   Low voltage QRS   Cannot rule out Anterior infarct (cited on or before 14-DEC-2018)   Abnormal ECG   When compared with ECG of 15-OCT-2022 20:12,   Vent. rate has increased BY  30 BPM   Confirmed by MD HAYDEN KYLE ([id number]) on 8/28/2024 3:24:28 AM      Referred By: JAD           Confirmed By: JAMES HAYDEN MD              No follow-up provider specified.       Medication List        Changed      cloNIDine 0.1 MG  tablet  Commonly known as: CATAPRES  Take 1 tablet by mouth 2 (Two) Times a Day.  What changed:   when to take this  reasons to take this                 Delmer Hamlin MD  08/28/24 3625

## 2024-08-28 NOTE — DISCHARGE INSTRUCTIONS
I recommend you take Tylenol 650 mg every 6 hours and ibuprofen 400 mg every 6 hours as needed for pain unless you have a contraindication to these medications  Call to schedule a follow-up appointment with your primary doctor.  Return to the ER as needed for new or worsening symptoms

## 2024-10-30 ENCOUNTER — TRANSCRIBE ORDERS (OUTPATIENT)
Dept: ADMINISTRATIVE | Facility: HOSPITAL | Age: 44
End: 2024-10-30
Payer: MEDICARE

## 2024-11-05 ENCOUNTER — APPOINTMENT (OUTPATIENT)
Dept: MRI IMAGING | Facility: HOSPITAL | Age: 44
End: 2024-11-05
Payer: MEDICARE

## 2024-11-05 ENCOUNTER — HOSPITAL ENCOUNTER (EMERGENCY)
Facility: HOSPITAL | Age: 44
Discharge: HOME | End: 2024-11-05
Attending: EMERGENCY MEDICINE | Admitting: EMERGENCY MEDICINE
Payer: MEDICARE

## 2024-11-05 VITALS
HEART RATE: 69 BPM | WEIGHT: 209 LBS | RESPIRATION RATE: 20 BRPM | BODY MASS INDEX: 29.92 KG/M2 | SYSTOLIC BLOOD PRESSURE: 158 MMHG | HEIGHT: 70 IN | DIASTOLIC BLOOD PRESSURE: 103 MMHG | TEMPERATURE: 98.3 F | OXYGEN SATURATION: 100 %

## 2024-11-05 DIAGNOSIS — G44.209 ACUTE NON INTRACTABLE TENSION-TYPE HEADACHE: ICD-10-CM

## 2024-11-05 DIAGNOSIS — I10 ACCELERATED HYPERTENSION: Primary | ICD-10-CM

## 2024-11-05 LAB
ALBUMIN SERPL-MCNC: 4.6 G/DL (ref 3.5–5.2)
ALBUMIN/GLOB SERPL: 1.3 G/DL
ALP SERPL-CCNC: 111 U/L (ref 39–117)
ALT SERPL W P-5'-P-CCNC: 21 U/L (ref 1–33)
ANION GAP SERPL CALCULATED.3IONS-SCNC: 13 MMOL/L (ref 5–15)
AST SERPL-CCNC: 19 U/L (ref 1–32)
BACTERIA UR QL AUTO: ABNORMAL /HPF
BASOPHILS # BLD AUTO: 0.07 10*3/MM3 (ref 0–0.2)
BASOPHILS NFR BLD AUTO: 1.1 % (ref 0–1.5)
BILIRUB SERPL-MCNC: 0.3 MG/DL (ref 0–1.2)
BILIRUB UR QL STRIP: NEGATIVE
BUN BLDA-MCNC: 19 MG/DL (ref 8–26)
BUN SERPL-MCNC: 18 MG/DL (ref 6–20)
BUN/CREAT SERPL: 17.3 (ref 7–25)
CA-I BLDA-SCNC: 1.22 MMOL/L (ref 1.2–1.32)
CALCIUM SPEC-SCNC: 9.4 MG/DL (ref 8.6–10.5)
CHLORIDE BLDA-SCNC: 101 MMOL/L (ref 98–109)
CHLORIDE SERPL-SCNC: 101 MMOL/L (ref 98–107)
CLARITY UR: ABNORMAL
CO2 BLDA-SCNC: 26 MMOL/L (ref 24–29)
CO2 SERPL-SCNC: 25 MMOL/L (ref 22–29)
COLOR UR: YELLOW
CREAT BLDA-MCNC: 1.2 MG/DL (ref 0.6–1.3)
CREAT SERPL-MCNC: 1.04 MG/DL (ref 0.57–1)
DEPRECATED RDW RBC AUTO: 45.1 FL (ref 37–54)
DEVELOPER EXPIRATION DATE: NORMAL
DEVELOPER LOT NUMBER: NORMAL
EGFRCR SERPLBLD CKD-EPI 2021: 57.7 ML/MIN/1.73
EGFRCR SERPLBLD CKD-EPI 2021: 68.5 ML/MIN/1.73
EOSINOPHIL # BLD AUTO: 0.16 10*3/MM3 (ref 0–0.4)
EOSINOPHIL NFR BLD AUTO: 2.6 % (ref 0.3–6.2)
ERYTHROCYTE [DISTWIDTH] IN BLOOD BY AUTOMATED COUNT: 14.1 % (ref 12.3–15.4)
EXPIRATION DATE: NORMAL
FECAL OCCULT BLOOD SCREEN, POC: NEGATIVE
GLOBULIN UR ELPH-MCNC: 3.6 GM/DL
GLUCOSE BLDC GLUCOMTR-MCNC: 95 MG/DL (ref 70–130)
GLUCOSE SERPL-MCNC: 87 MG/DL (ref 65–99)
GLUCOSE UR STRIP-MCNC: NEGATIVE MG/DL
HCT VFR BLD AUTO: 44.2 % (ref 34–46.6)
HCT VFR BLDA CALC: 43 % (ref 38–51)
HGB BLD-MCNC: 14.9 G/DL (ref 12–15.9)
HGB BLDA-MCNC: 14.6 G/DL (ref 12–17)
HGB UR QL STRIP.AUTO: NEGATIVE
HYALINE CASTS UR QL AUTO: ABNORMAL /LPF
IMM GRANULOCYTES # BLD AUTO: 0.05 10*3/MM3 (ref 0–0.05)
IMM GRANULOCYTES NFR BLD AUTO: 0.8 % (ref 0–0.5)
KETONES UR QL STRIP: NEGATIVE
LEUKOCYTE ESTERASE UR QL STRIP.AUTO: ABNORMAL
LYMPHOCYTES # BLD AUTO: 1.97 10*3/MM3 (ref 0.7–3.1)
LYMPHOCYTES NFR BLD AUTO: 32 % (ref 19.6–45.3)
Lab: NORMAL
MCH RBC QN AUTO: 29.6 PG (ref 26.6–33)
MCHC RBC AUTO-ENTMCNC: 33.7 G/DL (ref 31.5–35.7)
MCV RBC AUTO: 87.7 FL (ref 79–97)
MONOCYTES # BLD AUTO: 0.7 10*3/MM3 (ref 0.1–0.9)
MONOCYTES NFR BLD AUTO: 11.4 % (ref 5–12)
MUCOUS THREADS URNS QL MICRO: ABNORMAL /HPF
NEGATIVE CONTROL: NEGATIVE
NEUTROPHILS NFR BLD AUTO: 3.21 10*3/MM3 (ref 1.7–7)
NEUTROPHILS NFR BLD AUTO: 52.1 % (ref 42.7–76)
NITRITE UR QL STRIP: NEGATIVE
NRBC BLD AUTO-RTO: 0 /100 WBC (ref 0–0.2)
NT-PROBNP SERPL-MCNC: 153.2 PG/ML (ref 0–450)
PH UR STRIP.AUTO: 5.5 [PH] (ref 5–8)
PLATELET # BLD AUTO: 217 10*3/MM3 (ref 140–450)
PMV BLD AUTO: 10.5 FL (ref 6–12)
POSITIVE CONTROL: POSITIVE
POTASSIUM BLDA-SCNC: 3.5 MMOL/L (ref 3.5–4.9)
POTASSIUM SERPL-SCNC: 3.7 MMOL/L (ref 3.5–5.2)
PROT SERPL-MCNC: 8.2 G/DL (ref 6–8.5)
PROT UR QL STRIP: ABNORMAL
RBC # BLD AUTO: 5.04 10*6/MM3 (ref 3.77–5.28)
RBC # UR STRIP: ABNORMAL /HPF
RBC MORPH BLD: NORMAL
REF LAB TEST METHOD: ABNORMAL
SMALL PLATELETS BLD QL SMEAR: ADEQUATE
SODIUM BLD-SCNC: 140 MMOL/L (ref 138–146)
SODIUM SERPL-SCNC: 139 MMOL/L (ref 136–145)
SP GR UR STRIP: >=1.03 (ref 1–1.03)
SQUAMOUS #/AREA URNS HPF: ABNORMAL /HPF
TRANS CELLS #/AREA URNS HPF: ABNORMAL /HPF
TROPONIN T SERPL HS-MCNC: <6 NG/L
TSH SERPL DL<=0.05 MIU/L-ACNC: 3.43 UIU/ML (ref 0.27–4.2)
UROBILINOGEN UR QL STRIP: ABNORMAL
WBC # UR STRIP: ABNORMAL /HPF
WBC MORPH BLD: NORMAL
WBC NRBC COR # BLD AUTO: 6.16 10*3/MM3 (ref 3.4–10.8)

## 2024-11-05 PROCEDURE — 82270 OCCULT BLOOD FECES: CPT

## 2024-11-05 PROCEDURE — 96361 HYDRATE IV INFUSION ADD-ON: CPT

## 2024-11-05 PROCEDURE — 85007 BL SMEAR W/DIFF WBC COUNT: CPT

## 2024-11-05 PROCEDURE — 96375 TX/PRO/DX INJ NEW DRUG ADDON: CPT

## 2024-11-05 PROCEDURE — 25810000003 SODIUM CHLORIDE 0.9 % SOLUTION

## 2024-11-05 PROCEDURE — 25010000002 METOCLOPRAMIDE PER 10 MG

## 2024-11-05 PROCEDURE — 80053 COMPREHEN METABOLIC PANEL: CPT

## 2024-11-05 PROCEDURE — 36415 COLL VENOUS BLD VENIPUNCTURE: CPT

## 2024-11-05 PROCEDURE — 85025 COMPLETE CBC W/AUTO DIFF WBC: CPT

## 2024-11-05 PROCEDURE — 25010000002 KETOROLAC TROMETHAMINE PER 15 MG

## 2024-11-05 PROCEDURE — 80047 BASIC METABLC PNL IONIZED CA: CPT

## 2024-11-05 PROCEDURE — 84443 ASSAY THYROID STIM HORMONE: CPT

## 2024-11-05 PROCEDURE — 70551 MRI BRAIN STEM W/O DYE: CPT

## 2024-11-05 PROCEDURE — 81001 URINALYSIS AUTO W/SCOPE: CPT

## 2024-11-05 PROCEDURE — 99284 EMERGENCY DEPT VISIT MOD MDM: CPT

## 2024-11-05 PROCEDURE — 96374 THER/PROPH/DIAG INJ IV PUSH: CPT

## 2024-11-05 PROCEDURE — 83880 ASSAY OF NATRIURETIC PEPTIDE: CPT

## 2024-11-05 PROCEDURE — 25010000002 DIPHENHYDRAMINE PER 50 MG

## 2024-11-05 PROCEDURE — 85014 HEMATOCRIT: CPT

## 2024-11-05 PROCEDURE — 84484 ASSAY OF TROPONIN QUANT: CPT

## 2024-11-05 RX ORDER — BUTALBITAL, ACETAMINOPHEN AND CAFFEINE 50; 325; 40 MG/1; MG/1; MG/1
1 TABLET ORAL ONCE
Status: COMPLETED | OUTPATIENT
Start: 2024-11-05 | End: 2024-11-05

## 2024-11-05 RX ORDER — KETOROLAC TROMETHAMINE 15 MG/ML
15 INJECTION, SOLUTION INTRAMUSCULAR; INTRAVENOUS ONCE
Status: COMPLETED | OUTPATIENT
Start: 2024-11-05 | End: 2024-11-05

## 2024-11-05 RX ORDER — METOCLOPRAMIDE HYDROCHLORIDE 5 MG/ML
10 INJECTION INTRAMUSCULAR; INTRAVENOUS ONCE
Status: COMPLETED | OUTPATIENT
Start: 2024-11-05 | End: 2024-11-05

## 2024-11-05 RX ORDER — DIPHENHYDRAMINE HYDROCHLORIDE 50 MG/ML
25 INJECTION INTRAMUSCULAR; INTRAVENOUS ONCE
Status: COMPLETED | OUTPATIENT
Start: 2024-11-05 | End: 2024-11-05

## 2024-11-05 RX ADMIN — METOCLOPRAMIDE 10 MG: 5 INJECTION, SOLUTION INTRAMUSCULAR; INTRAVENOUS at 20:18

## 2024-11-05 RX ADMIN — BUTALBITAL, ACETAMINOPHEN, AND CAFFEINE 1 TABLET: 325; 50; 40 TABLET ORAL at 23:02

## 2024-11-05 RX ADMIN — SODIUM CHLORIDE 1000 ML: 9 INJECTION, SOLUTION INTRAVENOUS at 22:15

## 2024-11-05 RX ADMIN — DIPHENHYDRAMINE HYDROCHLORIDE 25 MG: 50 INJECTION INTRAMUSCULAR; INTRAVENOUS at 20:18

## 2024-11-05 RX ADMIN — KETOROLAC TROMETHAMINE 15 MG: 15 INJECTION, SOLUTION INTRAMUSCULAR; INTRAVENOUS at 20:19

## 2024-11-05 NOTE — ED PROVIDER NOTES
Subjective   History of Present Illness patient presents to the emergency department complaining of multiple symptoms especially exacerbated last 72 hours related to hypertension with documented measurements displayed in the 170s over 120s to 130s.  She also endorses headache, dizziness, visual changes, fine motor disturbances, intractable vomiting, malaise, rash, blood in stool.  She reports an onset of pregnancy related hypertension 17 years ago with recurrent exacerbations of poor blood pressure control and symptoms.  She has been referred to cardiology by her primary care provider, to follow-up with next week.    Review of Systems   Constitutional:  Positive for fatigue.   Eyes:  Positive for visual disturbance.   Respiratory: Negative.     Cardiovascular:  Positive for leg swelling.   Gastrointestinal:  Positive for blood in stool, nausea and vomiting.   Genitourinary: Negative.    Musculoskeletal: Negative.    Skin: Negative.    Neurological:  Positive for dizziness and headaches.   Psychiatric/Behavioral:  The patient is nervous/anxious.        Past Medical History:   Diagnosis Date    ADHD (attention deficit hyperactivity disorder)     Asthma     Bipolar 1 disorder     Hemorrhage     Herpes     Hypertension     Migraine     Urinary tract infection        Allergies   Allergen Reactions    Lamictal [Lamotrigine] Anaphylaxis    Amlodipine Irritability    Rocephin [Ceftriaxone] Hives    Tetracyclines & Related Other (See Comments)     Low h&h       Past Surgical History:   Procedure Laterality Date    CHOLECYSTECTOMY      COLONOSCOPY      unknown    HYSTERECTOMY      OOPHORECTOMY  2015    ROTATOR CUFF REPAIR      ULNAR NERVE REPAIR      UPPER GASTROINTESTINAL ENDOSCOPY  01/07/2019    Dr. Arroyo       Family History   Problem Relation Age of Onset    Arthritis Mother     Hypertension Mother     Thyroid disease Mother     Thyroid cancer Mother     Heart disease Father     Hypertension Father     Hyperlipidemia  Father     Colon cancer Maternal Grandmother     Breast cancer Paternal Grandmother     Diabetes Paternal Grandmother     Colon cancer Paternal Grandmother     Other Brother         HYDROCELE    Hypertension Brother     Other Maternal Grandfather         pulmonary fibrosis    Early death Paternal Grandfather     Other Maternal Uncle         pulmonary fibrosis    Other Maternal Aunt         pulmonary fibrosis       Social History     Socioeconomic History    Marital status:    Tobacco Use    Smoking status: Former     Current packs/day: 0.00     Types: Cigarettes     Start date:      Quit date: 2019     Years since quittin.9    Smokeless tobacco: Never    Tobacco comments:     SOCIAL SMOKER   Vaping Use    Vaping status: Never Used   Substance and Sexual Activity    Alcohol use: Not Currently    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   HENT:      Head: Normocephalic and atraumatic.      Right Ear: External ear normal.      Left Ear: External ear normal.   Eyes:      Extraocular Movements: Extraocular movements intact.      Conjunctiva/sclera: Conjunctivae normal.      Pupils: Pupils are equal, round, and reactive to light.   Cardiovascular:      Rate and Rhythm: Tachycardia present.      Pulses: Normal pulses.   Pulmonary:      Effort: Pulmonary effort is normal.   Abdominal:      General: Abdomen is flat.      Palpations: Abdomen is soft.      Tenderness: There is right CVA tenderness. There is no left CVA tenderness.   Musculoskeletal:         General: Normal range of motion.      Cervical back: Normal range of motion and neck supple.      Right lower leg: Edema present.      Left lower leg: Edema present.   Skin:     General: Skin is warm and dry.      Capillary Refill: Capillary refill takes less than 2 seconds.   Neurological:      General: No focal deficit present.       Mental Status: She is alert and oriented to person, place, and time.      Sensory: No sensory deficit.      Motor: Weakness present.      Coordination: Coordination normal.         Procedures           ED Course  ED Course as of 11/05/24 2245 Tue Nov 05, 2024   1725 Patient initially evaluated, ED pit area, ambulatory, unaccompanied. [JH]   1730 Twelve-lead ECG analysis.  Independently interpreted by myself in sinus tachycardia without concerning elevation or depression in anterior leads. [JH]   2003 CBC within normal limits, likewise serum chemistry, cardiac enzymes negative, TSH normal, urine pending, MRI pending. [JH]   2008 Reevaluated patient, because she continues to complain of severe headache, IV access has not been obtained, will order medication and potentially assist with this establishment for PIV L [JH]   2009 I established a 22-gauge peripheral IV in the right shoulder, patent with blood aspirated [JH]   2045 Has been transported for MRI study. [JH]   2141 The brain is negative for focal ischemia or edema.  Will communicate workup results to the patient reevaluate her discomfort . [JH]   2142 Evaluated the patient, she reports her headache is improved, she understands the importance of getting a urine specimen to complete her workup. [JH]   2221 Patient's requested pain medication for her headache, reviewed patient's history she had been prescribed Fioricet in October, and tolerates.  Ordered 1 dose here [JH]   2243 Fecal occult stool is negative, urinalysis with only trace protein, will proceed with disposition. [JH]      ED Course User Index  [JH] Rene Mckeon, JOVANNY                                               Medical Decision Making  The patient's complaints, her recurrent issues with blood pressure management control, her history and my exam, my differential includes malignant hypertension, cannot exclude acute coronary syndrome, acute renal failure, acute intracranial abnormality or press  syndrome.  Patient will have twelve-lead ECG, serum screening labs, urinalysis, MRI imaging of the brain, plain film imaging the chest.  Results of the workup will be communicated patient disposition to follow including consultation with specialist if indicated.  Patient is agreeable with this plan    Amount and/or Complexity of Data Reviewed  Labs: ordered.  ECG/medicine tests: ordered.        Final diagnoses:   Accelerated hypertension   Acute non intractable tension-type headache       ED Disposition  ED Disposition       ED Disposition   Discharge    Condition   Stable    Comment   --               Tracie Meraz, JOVANNY  [address]  [facility] [address] [phone]    Go to       Shadi Lester III, MD  [address]  [address]  [facility] [address]  [phone]    On 11/12/2024           Medication List        Changed      cloNIDine 0.1 MG tablet  Commonly known as: CATAPRES  Take 1 tablet by mouth 2 (Two) Times a Day.  What changed:   when to take this  reasons to take this                 Rene Mckeon APRN  11/05/24 1280

## 2024-11-06 NOTE — DISCHARGE INSTRUCTIONS
Follow-up with your primary care provider, as well as the cardiologist appointment next week for additional medication titration and adjustment for blood pressure management.  Take your blood pressure at home and record on the attached form.

## 2024-11-12 ENCOUNTER — OFFICE VISIT (OUTPATIENT)
Dept: CARDIOLOGY | Facility: CLINIC | Age: 44
End: 2024-11-12
Payer: MEDICARE

## 2024-11-12 VITALS
HEART RATE: 95 BPM | OXYGEN SATURATION: 95 % | SYSTOLIC BLOOD PRESSURE: 192 MMHG | DIASTOLIC BLOOD PRESSURE: 132 MMHG | BODY MASS INDEX: 30.91 KG/M2 | WEIGHT: 215.9 LBS | HEIGHT: 70 IN

## 2024-11-12 DIAGNOSIS — I10 ESSENTIAL HYPERTENSION: ICD-10-CM

## 2024-11-12 PROCEDURE — 3077F SYST BP >= 140 MM HG: CPT | Performed by: INTERNAL MEDICINE

## 2024-11-12 PROCEDURE — 3080F DIAST BP >= 90 MM HG: CPT | Performed by: INTERNAL MEDICINE

## 2024-11-12 PROCEDURE — 99204 OFFICE O/P NEW MOD 45 MIN: CPT | Performed by: INTERNAL MEDICINE

## 2024-11-12 RX ORDER — FLUTICASONE PROPIONATE AND SALMETEROL 100; 50 UG/1; UG/1
1 POWDER RESPIRATORY (INHALATION) 2 TIMES DAILY
Status: ON HOLD | COMMUNITY
Start: 2024-09-30

## 2024-11-12 RX ORDER — LISINOPRIL 20 MG/1
1 TABLET ORAL DAILY
COMMUNITY
Start: 2024-10-24 | End: 2024-11-12

## 2024-11-12 RX ORDER — ZIPRASIDONE HYDROCHLORIDE 60 MG/1
60 CAPSULE ORAL NIGHTLY
Status: ON HOLD | COMMUNITY
Start: 2024-10-22 | End: 2024-11-17

## 2024-11-12 RX ORDER — ROPINIROLE 1 MG/1
1 TABLET, FILM COATED ORAL NIGHTLY
Status: ON HOLD | COMMUNITY
Start: 2024-10-24

## 2024-11-12 RX ORDER — NEBIVOLOL 20 MG/1
20 TABLET ORAL DAILY
Qty: 30 TABLET | Refills: 5 | Status: ON HOLD | OUTPATIENT
Start: 2024-11-12

## 2024-11-12 RX ORDER — HYDRALAZINE HYDROCHLORIDE 50 MG/1
50 TABLET, FILM COATED ORAL EVERY 8 HOURS PRN
Qty: 30 TABLET | Refills: 5 | Status: ON HOLD | OUTPATIENT
Start: 2024-11-12

## 2024-11-12 RX ORDER — TRAZODONE HYDROCHLORIDE 150 MG/1
150 TABLET ORAL AS NEEDED
Status: ON HOLD | COMMUNITY
Start: 2024-10-22

## 2024-11-12 RX ORDER — BUTALBITAL, ACETAMINOPHEN AND CAFFEINE 50; 325; 40 MG/1; MG/1; MG/1
1 TABLET ORAL EVERY 4 HOURS PRN
Status: ON HOLD | COMMUNITY
Start: 2024-10-23 | End: 2024-11-17

## 2024-11-12 RX ORDER — EPINEPHRINE 0.3 MG/.3ML
INJECTION SUBCUTANEOUS
Status: ON HOLD | COMMUNITY
Start: 2024-08-27

## 2024-11-12 RX ORDER — NIFEDIPINE 30 MG
30 TABLET, EXTENDED RELEASE ORAL DAILY
Status: ON HOLD | COMMUNITY
Start: 2024-11-06

## 2024-11-12 RX ORDER — TRIAMTERENE AND HYDROCHLOROTHIAZIDE 37.5; 25 MG/1; MG/1
1 CAPSULE ORAL EVERY MORNING
Qty: 30 CAPSULE | Refills: 3 | Status: ON HOLD | OUTPATIENT
Start: 2024-11-12

## 2024-11-12 RX ORDER — FUROSEMIDE 20 MG/1
1 TABLET ORAL DAILY
Status: ON HOLD | COMMUNITY
Start: 2024-09-26

## 2024-11-12 RX ORDER — TRAZODONE HYDROCHLORIDE 100 MG/1
100 TABLET ORAL AS NEEDED
Status: ON HOLD | COMMUNITY
Start: 2024-10-21

## 2024-11-12 RX ORDER — VALSARTAN 160 MG/1
160 TABLET ORAL DAILY
Qty: 30 TABLET | Refills: 5 | Status: ON HOLD | OUTPATIENT
Start: 2024-11-12

## 2024-11-12 NOTE — PROGRESS NOTES
[facility] Cardiology  Consultation H&P  Bebe Rincon  1980  [phone]  There is no work phone number on file..    VISIT DATE:  11/12/2024    PCP: Tracie Meraz APRN  [address]  [place] [address]    CC:  Chief Complaint   Patient presents with    Essential hypertension    Establish Care       Previous cardiac studies and procedures:  12/2020   exercise myocardial perfusion imaging  Patient denied chest pain with exercise  Expected exercise time 9:10 Actual time 5:31 TIARRA +35. Patient requested to stop secondary to dizziness.  Patient was hypertensive at baseline with /100 mmHg. Peak blood pressure 200/120 mmHg.  No ischemic EKG changes with exercise  Myocardial perfusion imaging indicates a normal myocardial perfusion study with no evidence of ischemia.  Left ventricular ejection fraction is normal. (Calculated EF = 54%).  No significant coronary artery calcification  Impression: No EKG or perfusion evidence of ischemia. Patient does have significant resting hypertension and hypertensive response to exercise.  TTE  Calculated left ventricular EF = 55% Left ventricular systolic function is normal.  Left ventricular wall thickness is consistent with mild concentric hypertrophy  Cardiac valves appear structurally and functionally normal    ASSESSMENT:   Diagnosis Plan   1. Essential hypertension              PLAN:  Resistant hypertension: Goal less than 130/80 mmHg.  Renovascular duplex pending.  Renin angiotensin ratio pending.  Plasma metanephrines pending.  -Today we are adding triamterene hydrochlorothiazide with repeat BMP in 1 week  -Next week she will discontinue lisinopril and start valsartan 160 mg p.o. daily, with the potential plan of transitioning to Entresto.  -2 weeks from today she will discontinue carvedilol and start Bystolic 20 mg p.o. daily.  Continue nifedipine 30 mg p.o. daily.  Starting hydralazine to 50 mg p.o. every 8 hours  "for symptomatic hypertension.    She already appears compliant with a low-sodium diet.  Currently no clinical suspicion for significant underlying sleep apnea.    History of Present Illness   44-year-old female with longstanding history of resistant hypertension.  Was previously relatively well-controlled prior to the previous 3 to 4 weeks when she had an unexplained worsening in her baseline blood pressure.  She has had frequent blood pressures greater than 180/100 mmHg with associated fatigue and headache.  Reviewed recent  ED evaluation.  She appears compliant with medical therapy.  Non-smoker.  No significant alcohol or caffeine use.  Clonidine has not been effective as a as needed medication to lower blood pressure.  GI intolerance to spironolactone.  Previously developed hypokalemia with thiazide diuretic but appear to help control her blood pressure well.    PHYSICAL EXAMINATION:  Vitals:    11/12/24 0823   BP: (!) 192/132   BP Location: Left arm   Patient Position: Sitting   Cuff Size: Adult   Pulse: 95   SpO2: 95%   Weight: 97.9 kg (215 lb 14.4 oz)   Height: 177.8 cm (70\")     General Appearance:    Alert, cooperative, no distress, appears stated age   Head:    Normocephalic, without obvious abnormality, atraumatic   Eyes:    conjunctiva/corneas clear, EOM's intact, fundi     benign, both eyes   Ears:    Normal TM's and external ear canals, both ears   Nose:   Nares normal, septum midline, mucosa normal, no drainage    or sinus tenderness   Throat:   Lips, mucosa, and tongue normal; teeth and gums normal   Neck:   Supple, symmetrical, trachea midline, no adenopathy;     thyroid:  no enlargement/tenderness/nodules; no carotid    bruit or JVD   Back:     Symmetric, no curvature, ROM normal, no CVA tenderness   Lungs:     Clear to auscultation bilaterally, respirations unlabored   Chest Wall:    No tenderness or deformity    Heart:    Regular rate and rhythm, S1 and S2 normal, no murmur, rub   or gallop, " "normal carotid impulse bilaterally without bruit.   Abdomen:     Soft, non-tender, bowel sounds active all four quadrants,     no masses, no organomegaly   Extremities:   Extremities normal, atraumatic, no cyanosis or edema   Pulses:   2+ and symmetric all extremities   Skin:   Skin color, texture, turgor normal, no rashes or lesions   Lymph nodes:   Cervical, supraclavicular, and axillary nodes normal   Neurologic:   normal strength, sensation intact     throughout       Diagnostic Data:  Procedures  Lab Results   Component Value Date    TRIG 122 03/08/2018    HDL 64 (H) 03/08/2018     Lab Results   Component Value Date    GLUCOSE 87 11/05/2024    BUN 18 11/05/2024    CREATININE 1.20 11/05/2024     11/05/2024    K 3.7 11/05/2024     11/05/2024    CO2 25.0 11/05/2024     No results found for: \"HGBA1C\"  Lab Results   Component Value Date    WBC 6.16 11/05/2024    HGB 14.6 11/05/2024    HCT 43 11/05/2024     11/05/2024       PROBLEM LIST:  Patient Active Problem List   Diagnosis    Essential hypertension    Esophageal dysphagia    Atypical chest pain    Gastroesophageal reflux disease without esophagitis    Family history of colon cancer       PAST MEDICAL HX  Past Medical History:   Diagnosis Date    ADHD (attention deficit hyperactivity disorder)     Asthma     Bipolar 1 disorder     Hemorrhage     Herpes     Hypertension     Migraine     Urinary tract infection        Allergies  Allergies   Allergen Reactions    Lamictal [Lamotrigine] Anaphylaxis    Amlodipine Irritability    Rocephin [Ceftriaxone] Hives    Tetracyclines & Related Other (See Comments)     Low h&h       Current Medications    Current Outpatient Medications:     buPROPion XL (WELLBUTRIN XL) 150 MG 24 hr tablet, Take 1 tablet by mouth Daily., Disp: , Rfl:     buPROPion XL (WELLBUTRIN XL) 300 MG 24 hr tablet, Take 1 tablet by mouth Daily., Disp: , Rfl:     butalbital-acetaminophen-caffeine (FIORICET, ESGIC) -40 MG per tablet, " Take 1 tablet by mouth Every 4 (Four) Hours As Needed., Disp: , Rfl:     carvedilol (COREG) 25 MG tablet, Take 1 tablet by mouth 2 (Two) Times a Day., Disp: 180 tablet, Rfl: 3    cloNIDine (CATAPRES) 0.1 MG tablet, Take 1 tablet by mouth 2 (Two) Times a Day. (Patient taking differently: Take 1 tablet by mouth 2 (Two) Times a Day As Needed.), Disp: 60 tablet, Rfl: 5    EPINEPHrine (EPIPEN) 0.3 MG/0.3ML solution auto-injector injection, INJECT 1 PEN IN THE MUSCLE ONE TIME AS DIRECTED IF STUNG BY WASP THEN GO TO EMERGENCY ROOM, Disp: , Rfl:     FLUoxetine (PROzac) 40 MG capsule, , Disp: , Rfl:     Fluticasone-Salmeterol (ADVAIR/WIXELA) 100-50 MCG/ACT DISKUS, Inhale 1 puff 2 (Two) Times a Day., Disp: , Rfl:     furosemide (LASIX) 20 MG tablet, Take 1 tablet by mouth Daily., Disp: , Rfl:     hydrOXYzine (ATARAX) 25 MG tablet, , Disp: , Rfl:     ibuprofen (ADVIL,MOTRIN) 800 MG tablet, Take 1 tablet by mouth Every 6 (Six) Hours As Needed. for pain, Disp: , Rfl:     lisinopril (PRINIVIL,ZESTRIL) 20 MG tablet, Take 1 tablet by mouth Daily., Disp: , Rfl:     meclizine (ANTIVERT) 25 MG tablet, , Disp: , Rfl:     naloxone (NARCAN) 4 MG/0.1ML nasal spray, , Disp: , Rfl:     NIFEdipine CC (ADALAT CC) 30 MG 24 hr tablet, Take 1 tablet by mouth Daily., Disp: , Rfl:     pantoprazole (PROTONIX) 40 MG EC tablet, Take 1 tablet by mouth 2 (Two) Times a Day. 30 minutes prior to first meal and 30 minutes prior to last meal of the day, Disp: 180 tablet, Rfl: 3    PROAIR  (90 Base) MCG/ACT inhaler, Inhale 2 puffs Every 6 (Six) Hours As Needed., Disp: , Rfl:     promethazine (PHENERGAN) 25 MG tablet, Take 1 tablet by mouth Every 6 (Six) Hours As Needed for Nausea or Vomiting., Disp: 10 tablet, Rfl: 0    rOPINIRole (REQUIP) 1 MG tablet, Take 1 tablet by mouth Every Night., Disp: , Rfl:     topiramate (TOPAMAX) 200 MG tablet, , Disp: , Rfl:     traZODone (DESYREL) 100 MG tablet, Take 1 tablet by mouth As Needed., Disp: , Rfl:      traZODone (DESYREL) 150 MG tablet, Take 1 tablet by mouth As Needed., Disp: , Rfl:     Umeclidinium Bromide (INCRUSE ELLIPTA) 62.5 MCG/ACT aerosol powder , , Disp: , Rfl:     valACYclovir (VALTREX) 1000 MG tablet, Take 1 tablet by mouth Daily., Disp: , Rfl:     ziprasidone (GEODON) 60 MG capsule, Take 1 capsule by mouth Every Night., Disp: , Rfl:     DULoxetine (CYMBALTA) 30 MG capsule, , Disp: , Rfl:     losartan (Cozaar) 50 MG tablet, Take 1.5 tablets by mouth Daily. (Patient not taking: Reported on 2024), Disp: 90 tablet, Rfl: 3    methylPREDNISolone (MEDROL) 4 MG dose pack, Take as directed on package instructions. (Patient not taking: Reported on 2024), Disp: 21 tablet, Rfl: 0    potassium chloride (K-DUR,KLOR-CON) 20 MEQ CR tablet, , Disp: , Rfl:     prenatal vitamin (prenatal, CLASSIC, vitamin) tablet, Take 1 tablet by mouth Daily. (Patient not taking: Reported on 2024), Disp: , Rfl:     risperiDONE (risperDAL) 1 MG tablet, , Disp: , Rfl:     spironolactone (ALDACTONE) 100 MG tablet, Take 1 tablet by mouth 2 (Two) Times a Day. 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    Sexual activity: Not Currently     Partners: Male     Birth control/protection: Hysterectomy, Surgical       FAMILY HX  Family History   Problem Relation Age of Onset    Arthritis Mother     Hypertension Mother     Thyroid disease Mother     Thyroid cancer Mother     Heart disease Father      Hypertension Father     Hyperlipidemia Father     Arrhythmia Father     Heart attack Father     Colon cancer Maternal Grandmother     Heart disease Maternal Grandmother     Breast cancer Paternal Grandmother     Diabetes Paternal Grandmother     Colon cancer Paternal Grandmother     Hypertension Brother     Early death Paternal Grandfather     Heart disease Paternal Uncle              Shadi Lester III, MD, FACC

## 2024-11-17 ENCOUNTER — APPOINTMENT (OUTPATIENT)
Dept: CT IMAGING | Facility: HOSPITAL | Age: 44
End: 2024-11-17
Payer: MEDICARE

## 2024-11-17 ENCOUNTER — APPOINTMENT (OUTPATIENT)
Dept: GENERAL RADIOLOGY | Facility: HOSPITAL | Age: 44
End: 2024-11-17
Payer: MEDICARE

## 2024-11-17 ENCOUNTER — HOSPITAL ENCOUNTER (OUTPATIENT)
Facility: HOSPITAL | Age: 44
Discharge: HOME | End: 2024-11-19
Attending: EMERGENCY MEDICINE | Admitting: INTERNAL MEDICINE
Payer: MEDICARE

## 2024-11-17 ENCOUNTER — ANESTHESIA EVENT (OUTPATIENT)
Dept: GASTROENTEROLOGY | Facility: HOSPITAL | Age: 44
End: 2024-11-17
Payer: MEDICARE

## 2024-11-17 DIAGNOSIS — R13.10 DYSPHAGIA, UNSPECIFIED TYPE: ICD-10-CM

## 2024-11-17 DIAGNOSIS — K92.0 HEMATEMESIS WITH NAUSEA: ICD-10-CM

## 2024-11-17 DIAGNOSIS — K21.00 GASTROESOPHAGEAL REFLUX DISEASE WITH ESOPHAGITIS, UNSPECIFIED WHETHER HEMORRHAGE: Primary | ICD-10-CM

## 2024-11-17 DIAGNOSIS — R10.13 EPIGASTRIC ABDOMINAL PAIN: ICD-10-CM

## 2024-11-17 DIAGNOSIS — R13.14 PHARYNGOESOPHAGEAL DYSPHAGIA: ICD-10-CM

## 2024-11-17 DIAGNOSIS — N17.9 AKI (ACUTE KIDNEY INJURY): ICD-10-CM

## 2024-11-17 PROBLEM — R79.89 ELEVATED SERUM CREATININE: Status: ACTIVE | Noted: 2024-11-17

## 2024-11-17 PROBLEM — K22.70 BARRETT ESOPHAGUS: Chronic | Status: ACTIVE | Noted: 2024-11-17

## 2024-11-17 PROBLEM — F10.11 HISTORY OF ALCOHOL ABUSE: Chronic | Status: ACTIVE | Noted: 2024-11-17

## 2024-11-17 PROBLEM — R11.2 NAUSEA & VOMITING: Status: ACTIVE | Noted: 2024-11-17

## 2024-11-17 PROBLEM — E83.52 SERUM CALCIUM ELEVATED: Status: ACTIVE | Noted: 2024-11-17

## 2024-11-17 PROBLEM — F31.9 BIPOLAR 1 DISORDER: Chronic | Status: ACTIVE | Noted: 2024-11-17

## 2024-11-17 PROBLEM — I1A.0 RESISTANT HYPERTENSION: Chronic | Status: ACTIVE | Noted: 2024-11-17

## 2024-11-17 PROBLEM — R63.5 WEIGHT GAIN, ABNORMAL: Status: ACTIVE | Noted: 2024-11-17

## 2024-11-17 LAB
ALBUMIN SERPL-MCNC: 4.7 G/DL (ref 3.5–5.2)
ALBUMIN/GLOB SERPL: 1.3 G/DL
ALP SERPL-CCNC: 119 U/L (ref 39–117)
ALT SERPL W P-5'-P-CCNC: 24 U/L (ref 1–33)
ANION GAP SERPL CALCULATED.3IONS-SCNC: 16 MMOL/L (ref 5–15)
AST SERPL-CCNC: 22 U/L (ref 1–32)
BACTERIA UR QL AUTO: ABNORMAL /HPF
BASOPHILS # BLD AUTO: 0.04 10*3/MM3 (ref 0–0.2)
BASOPHILS NFR BLD AUTO: 0.4 % (ref 0–1.5)
BILIRUB SERPL-MCNC: 0.5 MG/DL (ref 0–1.2)
BILIRUB UR QL STRIP: NEGATIVE
BUN SERPL-MCNC: 24 MG/DL (ref 6–20)
BUN/CREAT SERPL: 17.5 (ref 7–25)
CALCIUM SPEC-SCNC: 10.8 MG/DL (ref 8.6–10.5)
CHLORIDE SERPL-SCNC: 95 MMOL/L (ref 98–107)
CLARITY UR: ABNORMAL
CO2 SERPL-SCNC: 25 MMOL/L (ref 22–29)
COLOR UR: YELLOW
CREAT SERPL-MCNC: 1.37 MG/DL (ref 0.57–1)
D DIMER PPP FEU-MCNC: 0.38 MCGFEU/ML (ref 0–0.5)
DEPRECATED RDW RBC AUTO: 44.9 FL (ref 37–54)
EGFRCR SERPLBLD CKD-EPI 2021: 49.2 ML/MIN/1.73
EOSINOPHIL # BLD AUTO: 0.03 10*3/MM3 (ref 0–0.4)
EOSINOPHIL NFR BLD AUTO: 0.3 % (ref 0.3–6.2)
ERYTHROCYTE [DISTWIDTH] IN BLOOD BY AUTOMATED COUNT: 14.2 % (ref 12.3–15.4)
GLOBULIN UR ELPH-MCNC: 3.7 GM/DL
GLUCOSE SERPL-MCNC: 98 MG/DL (ref 65–99)
GLUCOSE UR STRIP-MCNC: NEGATIVE MG/DL
HCT VFR BLD AUTO: 43.7 % (ref 34–46.6)
HGB BLD-MCNC: 14.8 G/DL (ref 12–15.9)
HGB UR QL STRIP.AUTO: NEGATIVE
HOLD SPECIMEN: NORMAL
HYALINE CASTS UR QL AUTO: ABNORMAL /LPF
IMM GRANULOCYTES # BLD AUTO: 0.02 10*3/MM3 (ref 0–0.05)
IMM GRANULOCYTES NFR BLD AUTO: 0.2 % (ref 0–0.5)
KETONES UR QL STRIP: NEGATIVE
LEUKOCYTE ESTERASE UR QL STRIP.AUTO: ABNORMAL
LIPASE SERPL-CCNC: 21 U/L (ref 13–60)
LYMPHOCYTES # BLD AUTO: 1.62 10*3/MM3 (ref 0.7–3.1)
LYMPHOCYTES NFR BLD AUTO: 16.8 % (ref 19.6–45.3)
MCH RBC QN AUTO: 29.5 PG (ref 26.6–33)
MCHC RBC AUTO-ENTMCNC: 33.9 G/DL (ref 31.5–35.7)
MCV RBC AUTO: 87.2 FL (ref 79–97)
MONOCYTES # BLD AUTO: 0.89 10*3/MM3 (ref 0.1–0.9)
MONOCYTES NFR BLD AUTO: 9.2 % (ref 5–12)
NEUTROPHILS NFR BLD AUTO: 7.07 10*3/MM3 (ref 1.7–7)
NEUTROPHILS NFR BLD AUTO: 73.1 % (ref 42.7–76)
NITRITE UR QL STRIP: NEGATIVE
NRBC BLD AUTO-RTO: 0 /100 WBC (ref 0–0.2)
NT-PROBNP SERPL-MCNC: 311.7 PG/ML (ref 0–450)
NT-PROBNP SERPL-MCNC: 318.5 PG/ML (ref 0–450)
PH UR STRIP.AUTO: 5.5 [PH] (ref 5–8)
PLATELET # BLD AUTO: 365 10*3/MM3 (ref 140–450)
PMV BLD AUTO: 9.9 FL (ref 6–12)
POTASSIUM SERPL-SCNC: 3.8 MMOL/L (ref 3.5–5.2)
PROT SERPL-MCNC: 8.4 G/DL (ref 6–8.5)
PROT UR QL STRIP: ABNORMAL
QT INTERVAL: 326 MS
QTC INTERVAL: 470 MS
RBC # BLD AUTO: 5.01 10*6/MM3 (ref 3.77–5.28)
RBC # UR STRIP: ABNORMAL /HPF
REF LAB TEST METHOD: ABNORMAL
SODIUM SERPL-SCNC: 136 MMOL/L (ref 136–145)
SP GR UR STRIP: 1.03 (ref 1–1.03)
SQUAMOUS #/AREA URNS HPF: ABNORMAL /HPF
TROPONIN T SERPL HS-MCNC: 9 NG/L
UROBILINOGEN UR QL STRIP: ABNORMAL
WBC # UR STRIP: ABNORMAL /HPF
WBC NRBC COR # BLD AUTO: 9.67 10*3/MM3 (ref 3.4–10.8)
WHOLE BLOOD HOLD COAG: NORMAL
WHOLE BLOOD HOLD SPECIMEN: NORMAL

## 2024-11-17 PROCEDURE — 93005 ELECTROCARDIOGRAM TRACING: CPT | Performed by: EMERGENCY MEDICINE

## 2024-11-17 PROCEDURE — G0378 HOSPITAL OBSERVATION PER HR: HCPCS

## 2024-11-17 PROCEDURE — 81001 URINALYSIS AUTO W/SCOPE: CPT | Performed by: PHYSICIAN ASSISTANT

## 2024-11-17 PROCEDURE — 63710000001 ONDANSETRON ODT 4 MG TABLET DISPERSIBLE: Performed by: NURSE PRACTITIONER

## 2024-11-17 PROCEDURE — 83880 ASSAY OF NATRIURETIC PEPTIDE: CPT | Performed by: EMERGENCY MEDICINE

## 2024-11-17 PROCEDURE — 25010000002 ONDANSETRON PER 1 MG: Performed by: PHYSICIAN ASSISTANT

## 2024-11-17 PROCEDURE — 25510000001 IOPAMIDOL 61 % SOLUTION: Performed by: EMERGENCY MEDICINE

## 2024-11-17 PROCEDURE — 25810000003 SODIUM CHLORIDE 0.9 % SOLUTION: Performed by: PHYSICIAN ASSISTANT

## 2024-11-17 PROCEDURE — 74177 CT ABD & PELVIS W/CONTRAST: CPT

## 2024-11-17 PROCEDURE — 71260 CT THORAX DX C+: CPT

## 2024-11-17 PROCEDURE — 96361 HYDRATE IV INFUSION ADD-ON: CPT

## 2024-11-17 PROCEDURE — 96374 THER/PROPH/DIAG INJ IV PUSH: CPT

## 2024-11-17 PROCEDURE — 71045 X-RAY EXAM CHEST 1 VIEW: CPT

## 2024-11-17 PROCEDURE — 83690 ASSAY OF LIPASE: CPT | Performed by: EMERGENCY MEDICINE

## 2024-11-17 PROCEDURE — 99223 1ST HOSP IP/OBS HIGH 75: CPT | Performed by: NURSE PRACTITIONER

## 2024-11-17 PROCEDURE — 25810000003 SODIUM CHLORIDE 0.9 % SOLUTION 1,000 ML FLEX CONT: Performed by: NURSE PRACTITIONER

## 2024-11-17 PROCEDURE — 85379 FIBRIN DEGRADATION QUANT: CPT | Performed by: PHYSICIAN ASSISTANT

## 2024-11-17 PROCEDURE — 83880 ASSAY OF NATRIURETIC PEPTIDE: CPT | Performed by: NURSE PRACTITIONER

## 2024-11-17 PROCEDURE — 84484 ASSAY OF TROPONIN QUANT: CPT | Performed by: EMERGENCY MEDICINE

## 2024-11-17 PROCEDURE — 85025 COMPLETE CBC W/AUTO DIFF WBC: CPT | Performed by: EMERGENCY MEDICINE

## 2024-11-17 PROCEDURE — 25010000002 MORPHINE PER 10 MG: Performed by: EMERGENCY MEDICINE

## 2024-11-17 PROCEDURE — 25010000002 PROCHLORPERAZINE 10 MG/2ML SOLUTION: Performed by: INTERNAL MEDICINE

## 2024-11-17 PROCEDURE — 80053 COMPREHEN METABOLIC PANEL: CPT | Performed by: EMERGENCY MEDICINE

## 2024-11-17 PROCEDURE — 25010000002 MORPHINE PER 10 MG: Performed by: NURSE PRACTITIONER

## 2024-11-17 PROCEDURE — 99285 EMERGENCY DEPT VISIT HI MDM: CPT

## 2024-11-17 PROCEDURE — 25010000002 HYDROMORPHONE 1 MG/ML SOLUTION: Performed by: INTERNAL MEDICINE

## 2024-11-17 PROCEDURE — 96375 TX/PRO/DX INJ NEW DRUG ADDON: CPT

## 2024-11-17 PROCEDURE — 96376 TX/PRO/DX INJ SAME DRUG ADON: CPT

## 2024-11-17 RX ORDER — TRIAMTERENE AND HYDROCHLOROTHIAZIDE 37.5; 25 MG/1; MG/1
1 TABLET ORAL DAILY
Status: DISCONTINUED | OUTPATIENT
Start: 2024-11-17 | End: 2024-11-18

## 2024-11-17 RX ORDER — DIPHENHYDRAMINE HYDROCHLORIDE, ZINC ACETATE 2; .1 G/100G; G/100G
1 CREAM TOPICAL 3 TIMES DAILY PRN
Status: DISCONTINUED | OUTPATIENT
Start: 2024-11-17 | End: 2024-11-19 | Stop reason: HOSPADM

## 2024-11-17 RX ORDER — ACETAMINOPHEN 325 MG/1
650 TABLET ORAL EVERY 4 HOURS PRN
Status: DISCONTINUED | OUTPATIENT
Start: 2024-11-17 | End: 2024-11-19 | Stop reason: HOSPADM

## 2024-11-17 RX ORDER — ALBUTEROL SULFATE 90 UG/1
2 INHALANT RESPIRATORY (INHALATION) EVERY 6 HOURS PRN
Status: DISCONTINUED | OUTPATIENT
Start: 2024-11-17 | End: 2024-11-19 | Stop reason: HOSPADM

## 2024-11-17 RX ORDER — PROCHLORPERAZINE EDISYLATE 5 MG/ML
10 INJECTION INTRAMUSCULAR; INTRAVENOUS EVERY 6 HOURS PRN
Status: DISCONTINUED | OUTPATIENT
Start: 2024-11-17 | End: 2024-11-19 | Stop reason: HOSPADM

## 2024-11-17 RX ORDER — BISACODYL 5 MG/1
5 TABLET, DELAYED RELEASE ORAL DAILY PRN
Status: DISCONTINUED | OUTPATIENT
Start: 2024-11-17 | End: 2024-11-19 | Stop reason: HOSPADM

## 2024-11-17 RX ORDER — SODIUM CHLORIDE 9 MG/ML
40 INJECTION, SOLUTION INTRAVENOUS AS NEEDED
Status: DISCONTINUED | OUTPATIENT
Start: 2024-11-17 | End: 2024-11-19 | Stop reason: HOSPADM

## 2024-11-17 RX ORDER — VALSARTAN 160 MG/1
160 TABLET ORAL DAILY
Status: DISCONTINUED | OUTPATIENT
Start: 2024-11-17 | End: 2024-11-19 | Stop reason: HOSPADM

## 2024-11-17 RX ORDER — BISACODYL 10 MG
10 SUPPOSITORY, RECTAL RECTAL DAILY PRN
Status: DISCONTINUED | OUTPATIENT
Start: 2024-11-17 | End: 2024-11-19 | Stop reason: HOSPADM

## 2024-11-17 RX ORDER — ONDANSETRON 4 MG/1
4 TABLET, ORALLY DISINTEGRATING ORAL EVERY 6 HOURS PRN
Status: DISCONTINUED | OUTPATIENT
Start: 2024-11-17 | End: 2024-11-19 | Stop reason: HOSPADM

## 2024-11-17 RX ORDER — NALOXONE HCL 0.4 MG/ML
0.4 VIAL (ML) INJECTION
Status: DISCONTINUED | OUTPATIENT
Start: 2024-11-17 | End: 2024-11-19 | Stop reason: HOSPADM

## 2024-11-17 RX ORDER — ZIPRASIDONE HYDROCHLORIDE 20 MG/1
40 CAPSULE ORAL NIGHTLY
Status: DISCONTINUED | OUTPATIENT
Start: 2024-11-17 | End: 2024-11-19 | Stop reason: HOSPADM

## 2024-11-17 RX ORDER — IOPAMIDOL 612 MG/ML
100 INJECTION, SOLUTION INTRAVASCULAR
Status: COMPLETED | OUTPATIENT
Start: 2024-11-17 | End: 2024-11-17

## 2024-11-17 RX ORDER — HYDRALAZINE HYDROCHLORIDE 50 MG/1
50 TABLET, FILM COATED ORAL EVERY 8 HOURS PRN
Status: DISCONTINUED | OUTPATIENT
Start: 2024-11-17 | End: 2024-11-19 | Stop reason: HOSPADM

## 2024-11-17 RX ORDER — ZIPRASIDONE HYDROCHLORIDE 40 MG/1
40 CAPSULE ORAL 2 TIMES DAILY WITH MEALS
COMMUNITY

## 2024-11-17 RX ORDER — MORPHINE SULFATE 4 MG/ML
4 INJECTION, SOLUTION INTRAMUSCULAR; INTRAVENOUS ONCE
Status: COMPLETED | OUTPATIENT
Start: 2024-11-17 | End: 2024-11-17

## 2024-11-17 RX ORDER — AMOXICILLIN 250 MG
2 CAPSULE ORAL 2 TIMES DAILY PRN
Status: DISCONTINUED | OUTPATIENT
Start: 2024-11-17 | End: 2024-11-19 | Stop reason: HOSPADM

## 2024-11-17 RX ORDER — SODIUM CHLORIDE 0.9 % (FLUSH) 0.9 %
10 SYRINGE (ML) INJECTION AS NEEDED
Status: DISCONTINUED | OUTPATIENT
Start: 2024-11-17 | End: 2024-11-19 | Stop reason: HOSPADM

## 2024-11-17 RX ORDER — POLYETHYLENE GLYCOL 3350 17 G/17G
17 POWDER, FOR SOLUTION ORAL DAILY PRN
Status: DISCONTINUED | OUTPATIENT
Start: 2024-11-17 | End: 2024-11-19 | Stop reason: HOSPADM

## 2024-11-17 RX ORDER — ACETAMINOPHEN 160 MG/5ML
650 SOLUTION ORAL EVERY 4 HOURS PRN
Status: DISCONTINUED | OUTPATIENT
Start: 2024-11-17 | End: 2024-11-19 | Stop reason: HOSPADM

## 2024-11-17 RX ORDER — PANTOPRAZOLE SODIUM 40 MG/10ML
40 INJECTION, POWDER, LYOPHILIZED, FOR SOLUTION INTRAVENOUS EVERY 12 HOURS SCHEDULED
Status: DISCONTINUED | OUTPATIENT
Start: 2024-11-17 | End: 2024-11-18

## 2024-11-17 RX ORDER — MORPHINE SULFATE 2 MG/ML
2 INJECTION, SOLUTION INTRAMUSCULAR; INTRAVENOUS EVERY 4 HOURS PRN
Status: DISCONTINUED | OUTPATIENT
Start: 2024-11-17 | End: 2024-11-19 | Stop reason: HOSPADM

## 2024-11-17 RX ORDER — NIFEDIPINE 30 MG/1
30 TABLET, EXTENDED RELEASE ORAL DAILY
Status: DISCONTINUED | OUTPATIENT
Start: 2024-11-17 | End: 2024-11-19 | Stop reason: HOSPADM

## 2024-11-17 RX ORDER — ONDANSETRON 2 MG/ML
4 INJECTION INTRAMUSCULAR; INTRAVENOUS EVERY 6 HOURS PRN
Status: DISCONTINUED | OUTPATIENT
Start: 2024-11-17 | End: 2024-11-19 | Stop reason: HOSPADM

## 2024-11-17 RX ORDER — ACETAMINOPHEN 650 MG/1
650 SUPPOSITORY RECTAL EVERY 4 HOURS PRN
Status: DISCONTINUED | OUTPATIENT
Start: 2024-11-17 | End: 2024-11-19 | Stop reason: HOSPADM

## 2024-11-17 RX ORDER — ONDANSETRON 2 MG/ML
4 INJECTION INTRAMUSCULAR; INTRAVENOUS ONCE
Status: COMPLETED | OUTPATIENT
Start: 2024-11-17 | End: 2024-11-17

## 2024-11-17 RX ORDER — SODIUM CHLORIDE 0.9 % (FLUSH) 0.9 %
10 SYRINGE (ML) INJECTION EVERY 12 HOURS SCHEDULED
Status: DISCONTINUED | OUTPATIENT
Start: 2024-11-17 | End: 2024-11-19 | Stop reason: HOSPADM

## 2024-11-17 RX ADMIN — ACETAMINOPHEN 650 MG: 325 TABLET ORAL at 20:45

## 2024-11-17 RX ADMIN — PROCHLORPERAZINE EDISYLATE 10 MG: 5 INJECTION INTRAMUSCULAR; INTRAVENOUS at 22:42

## 2024-11-17 RX ADMIN — ONDANSETRON 4 MG: 4 TABLET, ORALLY DISINTEGRATING ORAL at 20:46

## 2024-11-17 RX ADMIN — ONDANSETRON 4 MG: 2 INJECTION INTRAMUSCULAR; INTRAVENOUS at 14:47

## 2024-11-17 RX ADMIN — SODIUM CHLORIDE 1000 ML: 9 INJECTION, SOLUTION INTRAVENOUS at 16:31

## 2024-11-17 RX ADMIN — MORPHINE SULFATE 2 MG: 2 INJECTION, SOLUTION INTRAMUSCULAR; INTRAVENOUS at 20:46

## 2024-11-17 RX ADMIN — Medication 10 ML: at 20:47

## 2024-11-17 RX ADMIN — HYDROMORPHONE HYDROCHLORIDE 1 MG: 1 INJECTION, SOLUTION INTRAMUSCULAR; INTRAVENOUS; SUBCUTANEOUS at 22:42

## 2024-11-17 RX ADMIN — MORPHINE SULFATE 4 MG: 4 INJECTION, SOLUTION INTRAMUSCULAR; INTRAVENOUS at 14:47

## 2024-11-17 RX ADMIN — IOPAMIDOL 80 ML: 612 INJECTION, SOLUTION INTRAVENOUS at 15:11

## 2024-11-17 RX ADMIN — DIPHENHYDRAMINE HYDROCHLORIDE, ZINC ACETATE 1 APPLICATION: 2; .1 CREAM TOPICAL at 23:19

## 2024-11-17 RX ADMIN — NIFEDIPINE 30 MG: 30 TABLET, FILM COATED, EXTENDED RELEASE ORAL at 20:46

## 2024-11-17 RX ADMIN — SODIUM CHLORIDE 40 ML: 9 INJECTION, SOLUTION INTRAVENOUS at 20:49

## 2024-11-17 NOTE — ED PROVIDER NOTES
Subjective  History of Present Illness:    Chief Complaint: Shortness of breath  History of Present Illness: 44-year-old female with shortness of breath, she also states that she vomited bile and blood prior to arrival.  Significant past medical history for hypertension, asthma, she states that she was recently seen by her cardiologist who changed her blood pressure meds.  She states that she has shortness of breath and tightness in her chest.  Onset: Sudden  Duration: Symptoms just prior to arrival  Exacerbating / Alleviating factors: Vomiting bowel, the bowel had blood in it she reports  Associated symptoms: Shortness of breath      Nurses Notes reviewed and agree, including vitals, allergies, social history and prior medical history.     REVIEW OF SYSTEMS: All systems reviewed and not pertinent unless noted.    Review of Systems   Respiratory:  Positive for chest tightness and shortness of breath.    Cardiovascular:         Hypertension   Gastrointestinal:  Positive for vomiting.   All other systems reviewed and are negative.      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    Bipolar 1 disorder     Bipolar 1 disorder 11/17/2024    Hemorrhage     Herpes     History of alcohol abuse 11/17/2024    Sober since 11/1 2020      Hypertension     Migraine     Resistant hypertension 11/17/2024    Urinary tract infection     Weight gain, abnormal 11/17/2024    Reports 30 lbs increase unintentional over 4 months         Allergies:    Lamictal [lamotrigine], Amlodipine, Rocephin [ceftriaxone], and Tetracyclines & related      Past Surgical History:   Procedure Laterality Date    CHOLECYSTECTOMY      COLONOSCOPY      unknown    HYSTERECTOMY      OOPHORECTOMY  2015    ROTATOR CUFF REPAIR      ULNAR NERVE REPAIR      UPPER GASTROINTESTINAL ENDOSCOPY  01/07/2019    Dr. Arroyo         Social History     Socioeconomic History    Marital  "status:    Tobacco Use    Smoking status: Former     Current packs/day: 0.00     Types: Cigarettes     Start date: 1998     Quit date: 2019     Years since quittin.9     Passive exposure: Past    Smokeless tobacco: Never   Vaping Use    Vaping status: Never Used   Substance and Sexual Activity    Alcohol use: Not Currently     Comment: sober since 2020    Drug use: Not Currently     Comment: 2 years sober    Sexual activity: Not Currently     Partners: Male     Birth control/protection: Hysterectomy, Surgical         Family History   Problem Relation Age of Onset    Arthritis Mother     Hypertension Mother     Thyroid disease Mother     Thyroid cancer Mother     Heart disease Father     Hypertension Father     Hyperlipidemia Father     Arrhythmia Father     Heart attack Father     Hypertension Brother     No Known Problems Daughter     No Known Problems Daughter     No Known Problems Daughter     Heart disease Paternal Uncle     Colon cancer Maternal Grandmother     Heart disease Maternal Grandmother     Breast cancer Paternal Grandmother     Diabetes Paternal Grandmother     Colon cancer Paternal Grandmother     Early death Paternal Grandfather        Objective  Physical Exam:  BP (!) 166/126   Pulse 98   Temp 98.2 °F (36.8 °C) (Oral)   Resp 16   Ht 177.8 cm (70\")   Wt 95.5 kg (210 lb 9.6 oz)   LMP  (LMP Unknown) Comment: NO PAP SINCE HYSTERECTOMY  SpO2 98%   BMI 30.22 kg/m²      Physical Exam  Vitals and nursing note reviewed.   Constitutional:       Appearance: She is well-developed.   HENT:      Head: Normocephalic and atraumatic.   Cardiovascular:      Rate and Rhythm: Normal rate and regular rhythm.   Pulmonary:      Effort: Pulmonary effort is normal.      Breath sounds: Normal breath sounds. No decreased breath sounds or wheezing.   Abdominal:      Palpations: Abdomen is soft.      Tenderness: There is abdominal tenderness.   Musculoskeletal:         General: Normal range of " motion.      Cervical back: Normal range of motion and neck supple.   Skin:     General: Skin is warm and dry.   Neurological:      Mental Status: She is alert and oriented to person, place, and time.      Deep Tendon Reflexes: Reflexes are normal and symmetric.           Procedures    ED Course:    CT abdomen pelvis interpretation by me: No acute intra-abdominal abnormality    CT chest interpretation by me: No acute intrathoracic abnormality    ED Course as of 11/17/24 2103   Sun Nov 17, 2024   1424 Review of previous  non ED visits, prior labs, prior imaging, available notes from prior evaluations or visits with specialists, medication list, allergies, past medical history, past surgical history     [CS]   1425 Chest x-ray interpretation by me: No acute cardiopulmonary abnormality [CS]   1527 I Personally reviewed all laboratory studies performed in the emergency department  [CS]   1653 Page sent to the exchange for Dr. Brunner [CS]   1727 Discussed the case with Dr. Hernandez, he recommended admission, patient does have PAULETTE, likely from dehydration, n.p.o. after midnight plan for EGD in the morning [CS]   1731 Creatinine(!): 1.37  Mild acute kidney injury noted when compared to prior. [RS]      ED Course User Index  [CS] Russ Tam Jr., MAYANK  [RS] Dyllan Loo MD       Lab Results (last 24 hours)       Procedure Component Value Units Date/Time    CBC & Differential [[id number]]  (Abnormal) Collected: 11/17/24 1421    Specimen: Blood Updated: 11/17/24 1430    Narrative:      The following orders were created for panel order CBC & Differential.  Procedure                               Abnormality         Status                     ---------                               -----------         ------                     CBC Auto Differential[[id number]]        Abnormal            Final result                 Please view results for these tests on the individual orders.    Comprehensive Metabolic Panel  [[id number]]  (Abnormal) Collected: 11/17/24 1421    Specimen: Blood Updated: 11/17/24 1449     Glucose 98 mg/dL      BUN 24 mg/dL      Creatinine 1.37 mg/dL      Sodium 136 mmol/L      Potassium 3.8 mmol/L      Chloride 95 mmol/L      CO2 25.0 mmol/L      Calcium 10.8 mg/dL      Total Protein 8.4 g/dL      Albumin 4.7 g/dL      ALT (SGPT) 24 U/L      AST (SGOT) 22 U/L      Alkaline Phosphatase 119 U/L      Total Bilirubin 0.5 mg/dL      Globulin 3.7 gm/dL      Comment: Calculated Result        A/G Ratio 1.3 g/dL      BUN/Creatinine Ratio 17.5     Anion Gap 16.0 mmol/L      eGFR 49.2 mL/min/1.73     Narrative:      GFR Normal >60  Chronic Kidney Disease <60  Kidney Failure <15      BNP [[id number]]  (Normal) Collected: 11/17/24 1421    Specimen: Blood Updated: 11/17/24 1449     proBNP 318.5 pg/mL     Narrative:      This assay is used as an aid in the diagnosis of individuals suspected of having heart failure. It can be used as an aid in the diagnosis of acute decompensated heart failure (ADHF) in patients presenting with signs and symptoms of ADHF to the emergency department (ED). In addition, NT-proBNP of <300 pg/mL indicates ADHF is not likely.    Age Range Result Interpretation  NT-proBNP Concentration (pg/mL:      <50             Positive            >450                   Gray                 300-450                    Negative             <300    50-75           Positive            >900                  Gray                300-900                  Negative            <300      >75             Positive            >1800                  Gray                300-1800                  Negative            <300    Single High Sensitivity Troponin T [[id number]]  (Normal) Collected: 11/17/24 1421    Specimen: Blood Updated: 11/17/24 1449     HS Troponin T 9 ng/L     Narrative:      High Sensitive Troponin T Reference Range:  <14.0 ng/L- Negative Female for AMI  <22.0 ng/L- Negative Male for AMI  >=14 - Abnormal Female  indicating possible myocardial injury.  >=22 - Abnormal Male indicating possible myocardial injury.   Clinicians would have to utilize clinical acumen, EKG, Troponin, and serial changes to determine if it is an Acute Myocardial Infarction or myocardial injury due to an underlying chronic condition.         CBC Auto Differential [[id number]]  (Abnormal) Collected: 11/17/24 1421    Specimen: Blood Updated: 11/17/24 1430     WBC 9.67 10*3/mm3      RBC 5.01 10*6/mm3      Hemoglobin 14.8 g/dL      Hematocrit 43.7 %      MCV 87.2 fL      MCH 29.5 pg      MCHC 33.9 g/dL      RDW 14.2 %      RDW-SD 44.9 fl      MPV 9.9 fL      Platelets 365 10*3/mm3      Neutrophil % 73.1 %      Lymphocyte % 16.8 %      Monocyte % 9.2 %      Eosinophil % 0.3 %      Basophil % 0.4 %      Immature Grans % 0.2 %      Neutrophils, Absolute 7.07 10*3/mm3      Lymphocytes, Absolute 1.62 10*3/mm3      Monocytes, Absolute 0.89 10*3/mm3      Eosinophils, Absolute 0.03 10*3/mm3      Basophils, Absolute 0.04 10*3/mm3      Immature Grans, Absolute 0.02 10*3/mm3      nRBC 0.0 /100 WBC     D-dimer, Quantitative [[id number]]  (Normal) Collected: 11/17/24 1421    Specimen: Blood Updated: 11/17/24 1443     D-Dimer, Quantitative 0.38 MCGFEU/mL     Narrative:      According to the assay 's published package insert, a normal (<0.50 MCGFEU/mL) D-dimer result in conjunction with a non-high clinical probability assessment, excludes deep vein thrombosis (DVT) and pulmonary embolism (PE) with high sensitivity.    D-dimer values increase with age and this can make VTE exclusion of an older population difficult. To address this, the [employer], based on best available evidence and recent guidelines, recommends that clinicians use age-adjusted D-dimer thresholds in patients greater than 50 years of age with: a) a low probability of PE who do not meet all Pulmonary Embolism Rule Out Criteria, or b) in those with intermediate probability of  "PE.   The formula for an age-adjusted D-dimer cut-off is \"age/100\".  For example, a 60 year old patient would have an age-adjusted cut-off of 0.60 MCGFEU/mL and an 80 year old 0.80 MCGFEU/mL.    Lipase [[id number]]  (Normal) Collected: 11/17/24 1421    Specimen: Blood Updated: 11/17/24 1449     Lipase 21 U/L     proBNP [[id number]]  (Normal) Collected: 11/17/24 1421    Specimen: Blood Updated: 11/17/24 1829     proBNP 311.7 pg/mL     Narrative:      This assay is used as an aid in the diagnosis of individuals suspected of having heart failure. It can be used as an aid in the diagnosis of acute decompensated heart failure (ADHF) in patients presenting with signs and symptoms of ADHF to the emergency department (ED). In addition, NT-proBNP of <300 pg/mL indicates ADHF is not likely.    Age Range Result Interpretation  NT-proBNP Concentration (pg/mL:      <50             Positive            >450                   Gray                 300-450                    Negative             <300    50-75           Positive            >900                  Gray                300-900                  Negative            <300      >75             Positive            >1800                  Gray                300-1800                  Negative            <300    Urinalysis With Microscopic If Indicated (No Culture) - Urine, Clean Catch [[id number]]  (Abnormal) Collected: 11/17/24 1835    Specimen: Urine, Clean Catch Updated: 11/17/24 1849     Color, UA Yellow     Appearance, UA Cloudy     pH, UA 5.5     Specific Gravity, UA 1.026     Glucose, UA Negative     Ketones, UA Negative     Bilirubin, UA Negative     Blood, UA Negative     Protein, UA Trace     Leuk Esterase, UA Small (1+)     Nitrite, UA Negative     Urobilinogen, UA 0.2 E.U./dL    Urinalysis, Microscopic Only - Urine, Clean Catch [[id number]]  (Abnormal) Collected: 11/17/24 1835    Specimen: Urine, Clean Catch Updated: 11/17/24 1902     RBC, UA None Seen /HPF      WBC, UA " 0-2 /HPF      Bacteria, UA 4+ /HPF      Squamous Epithelial Cells, UA 7-12 /HPF      Hyaline Casts, UA 13-20 /LPF      Methodology Manual Light Microscopy             CT Chest With Contrast Diagnostic    Result Date: 11/17/2024  CT ABDOMEN PELVIS W CONTRAST, CT CHEST W CONTRAST DIAGNOSTIC Date of Exam: 11/17/2024 3:08 PM EST Indication: vomiting blood tinged bile. Comparison: 11/15/2009. Technique: Axial CT images were obtained of the chest, abdomen and pelvis following the uneventful intravenous administration of 80 mL Isovue-300 8. Reconstructed coronal and sagittal images were also obtained. Automated exposure control and iterative construction methods were used. Findings: CT chest: There is no pathologic axillary adenopathy or other worrisome body wall soft tissue finding in the chest. There is no pleural or pericardial effusion. Nonaneurysmal thoracic aorta. There is no pathologic mediastinal or hilar adenopathy. The lung fields demonstrate no evidence of acute infectious process or distinct suspicious focal pulmonary nodularity. Central airways are clear. The osseous structures demonstrate no evidence of acute fracture or aggressive osseous lesion. CT abdomen pelvis: The liver, spleen, pancreas and bilateral adrenal glands demonstrate no acute or suspicious findings. The kidneys demonstrate symmetric nephrograms and contrast excretion without evidence of stones, hydronephrosis or nephropathy. Prior  cholecystectomy. Small and large bowel loops are nondilated. The appendix is normal. There is no free fluid or pneumoperitoneum. Nonaneurysmal abdominal aorta. There is no worrisome retroperitoneal lymphadenopathy. The pelvic viscera demonstrate no acute or suspicious findings. The osseous structures demonstrate no evidence of acute fracture or aggressive osseous lesion.     Impression: Impression: Essentially normal contrast-enhanced CT of the chest, abdomen and pelvis. No acute findings are present.  Electronically Signed: Leobardo Browning MD  11/17/2024 3:52 PM EST  Workstation ID: [id number]    CT Abdomen Pelvis With Contrast    Result Date: 11/17/2024  CT ABDOMEN PELVIS W CONTRAST, CT CHEST W CONTRAST DIAGNOSTIC Date of Exam: 11/17/2024 3:08 PM EST Indication: vomiting blood tinged bile. Comparison: 11/15/2009. Technique: Axial CT images were obtained of the chest, abdomen and pelvis following the uneventful intravenous administration of 80 mL Isovue-300 8. Reconstructed coronal and sagittal images were also obtained. Automated exposure control and iterative construction methods were used. Findings: CT chest: There is no pathologic axillary adenopathy or other worrisome body wall soft tissue finding in the chest. There is no pleural or pericardial effusion. Nonaneurysmal thoracic aorta. There is no pathologic mediastinal or hilar adenopathy. The lung fields demonstrate no evidence of acute infectious process or distinct suspicious focal pulmonary nodularity. Central airways are clear. The osseous structures demonstrate no evidence of acute fracture or aggressive osseous lesion. CT abdomen pelvis: The liver, spleen, pancreas and bilateral adrenal glands demonstrate no acute or suspicious findings. The kidneys demonstrate symmetric nephrograms and contrast excretion without evidence of stones, hydronephrosis or nephropathy. Prior  cholecystectomy. Small and large bowel loops are nondilated. The appendix is normal. There is no free fluid or pneumoperitoneum. Nonaneurysmal abdominal aorta. There is no worrisome retroperitoneal lymphadenopathy. The pelvic viscera demonstrate no acute or suspicious findings. The osseous structures demonstrate no evidence of acute fracture or aggressive osseous lesion.     Impression: Impression: Essentially normal contrast-enhanced CT of the chest, abdomen and pelvis. No acute findings are present. Electronically Signed: Leobardo Browning MD  11/17/2024 3:52 PM EST  Workstation ID:  [id number]    XR Chest 1 View    Result Date: 11/17/2024  XR CHEST 1 VW Date of Exam: 11/17/2024 1:54 PM EST Indication: SOA triage protocol Comparison: August 28, 2024 Findings: Heart not enlarged. The lungs seem clear. There are no pleural effusions. The visualized osseous structures do not appear unusual.     Impression: Impression: 1.An acute pulmonary process is not apparent. Electronically Signed: Chaim Hoang MD  11/17/2024 2:21 PM EST  Workstation ID: [id number]                [unfilled]                                       [email or web address]                    Medical Decision Making  Patient Presentation 44-year-old female presented with midepigastric pain and chest burning, after several episodes of regurgitation, GERD, and episode of vomiting of bile with blood in it, minus assessment she had tenderness to palpation in her mid epigastric region    DDX GERD, reflux, dysphagia, ulcer, gastritis, enteritis, colitis    Data Review/ Non ED Records /Analysis/Ordering unique tests  Review of previous  non ED visits, prior labs, prior imaging, available notes from prior evaluations or visits with specialists, medication list, allergies, past medical history, past surgical history        Independent Review Studies  I Personally reviewed all laboratory studies performed in the emergency department     Intervention/Re-evaluation intervention included IV fluids, antiemetics, reevaluation patient remained stable    Independent Clinician discussed the case with the on-call gastroenterologist, discussed the case with the on-call hospitalist    Risk Stratification tools/clinical decision rules patient presented with mid epigastric and abdominal pain, episodes of vomiting with blood-tinged, symptoms of gastritis and regurgitation.  CT scan of the chest and abdomen were abnormal, was concerned about enteritis or colitis, GERD, reflux, dysphagia.  Based on the patient's symptoms and intolerance of food, as well as acute kidney  insufficiency likely from dehydration she was admitted for further care with anticipation of EGD in the morning    Shared Decision Making discussed all findings with patient and family they were agreeable    Disposition patient stable for admission    Problems Addressed:  PAULETTE (acute kidney injury): complicated acute illness or injury  Dysphagia, unspecified type: complicated acute illness or injury    Amount and/or Complexity of Data Reviewed  External Data Reviewed: labs, radiology and notes.     Details: Review of previous  non ED visits, prior labs, prior imaging, available notes from prior evaluations or visits with specialists, medication list, allergies, past medical history, past surgical history      Labs: ordered. Decision-making details documented in ED Course.     Details: I Personally reviewed all laboratory studies performed in the emergency department   Radiology: ordered and independent interpretation performed. Decision-making details documented in ED Course.  ECG/medicine tests: ordered.    Risk  Prescription drug management.  Decision regarding hospitalization.          Final diagnoses:   PAULETTE (acute kidney injury)   Dysphagia, unspecified type           Disposition admission       Russ Tam Jr., PA-C  11/17/24 [id number]

## 2024-11-17 NOTE — PROGRESS NOTES
Discussed with Russ Tam PA-C in ER. Patient with worsening epigastric pain, dysphagia, nausea, and blood-tinged bilious emesis. Unable to eat. She has dehydration, PAULETTE, and metabolic acidosis. At risk for PUD (ibuprofen).    Plan EGD in AM, with possible esophageal dilation.    Full consult in AM.

## 2024-11-17 NOTE — ED NOTES
Bebe Rincon    Nursing Report ED to Floor:  Mental status: ao4  Ambulatory status: standby  Oxygen Therapy:  ra  Cardiac Rhythm: nsr  Admitted from: ed  Safety Concerns:  fall risk  Social Issues: none  ED Room #:  6    ED Nurse Phone Extension - [phone] or may call [phone].      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 Arroyo        Admitting Doctor:   Kalie Michelle MD    Consulting Provider(s):  Consults       No orders found from 10/19/2024 to 11/18/2024.             Admitting Diagnosis:   The primary encounter diagnosis was Epigastric abdominal pain. Diagnoses of Hematemesis with nausea, Pharyngoesophageal dysphagia, PAULETTE (acute kidney injury), and Dysphagia, unspecified type were also pertinent to this visit.    Most Recent Vitals:   Vitals:    11/17/24 1531 11/17/24 1532 11/17/24 1600 11/17/24 1601   BP:   (!) 175/118    BP Location:       Patient Position:       Pulse: 109 105  109   Resp:       Temp:       TempSrc:       SpO2: 96% 98%  96%   Weight:       Height:           Active LDAs/IV Access:   Lines, Drains & Airways       Active LDAs       Name Placement date Placement time Site Days    Peripheral IV 11/17/24 1422 Posterior;Right Hand 11/17/24  1422  Hand  less than 1                    Labs (abnormal labs have a star):   Labs Reviewed   COMPREHENSIVE METABOLIC PANEL - Abnormal; Notable for the following components:        Result Value    BUN 24 (*)     Creatinine 1.37 (*)     Chloride 95 (*)     Calcium 10.8 (*)     Alkaline Phosphatase 119 (*)     Anion Gap 16.0 (*)     eGFR 49.2 (*)     All other components within normal limits    Narrative:     GFR Normal >60  Chronic Kidney Disease <60  Kidney Failure <15     CBC WITH AUTO DIFFERENTIAL - Abnormal; Notable for the following components:    Lymphocyte % 16.8 (*)     Neutrophils, Absolute 7.07 (*)     All other components within normal limits   BNP (IN-HOUSE) - Normal    Narrative:     This assay is used as an aid in the diagnosis of individuals suspected of having heart failure. It can be used as an aid in the diagnosis of acute decompensated heart failure (ADHF) in patients presenting with signs and symptoms of ADHF to the emergency department (ED). In addition, NT-proBNP of <300 pg/mL indicates ADHF is not likely.    Age Range Result Interpretation  NT-proBNP Concentration (pg/mL:      <50             Positive            >450                   Gray                 300-450                    Negative             <300    50-75           Positive            >900                  Gray                300-900                  Negative            <300      >75             Positive            >1800                  Gray                300-1800                  Negative            <300   SINGLE HS TROPONIN T - Normal    Narrative:     High Sensitive Troponin T Reference Range:  <14.0 ng/L- Negative Female for AMI  <22.0 ng/L- Negative Male for AMI  >=14 - Abnormal Female indicating possible myocardial injury.  >=22 - Abnormal Male indicating possible myocardial injury.   Clinicians would have to utilize clinical acumen, EKG, Troponin, and serial changes to determine if it is an Acute Myocardial Infarction or myocardial injury due to an underlying chronic condition.        D-DIMER, QUANTITATIVE - Normal    Narrative:     According to the assay 's published package insert, a  "normal (<0.50 MCGFEU/mL) D-dimer result in conjunction with a non-high clinical probability assessment, excludes deep vein thrombosis (DVT) and pulmonary embolism (PE) with high sensitivity.    D-dimer values increase with age and this can make VTE exclusion of an older population difficult. To address this, the [employer] College [employer], based on best available evidence and recent guidelines, recommends that clinicians use age-adjusted D-dimer thresholds in patients greater than 50 years of age with: a) a low probability of PE who do not meet all Pulmonary Embolism Rule Out Criteria, or b) in those with intermediate probability of PE.   The formula for an age-adjusted D-dimer cut-off is \"age/100\".  For example, a 60 year old patient would have an age-adjusted cut-off of 0.60 MCGFEU/mL and an 80 year old 0.80 MCGFEU/mL.   LIPASE - Normal   RAINBOW DRAW    Narrative:     The following orders were created for panel order [place] Draw.  Procedure                               Abnormality         Status                     ---------                               -----------         ------                     Green Top (Gel)[[id number]]                                  Final result               Lavender Top[[id number]]                                     Final result               Gold Top - SST[[id number]]                                   Final result               Gray Top[[id number]]                                         Final result               Light Blue Top[[id number]]                                   Final result                 Please view results for these tests on the individual orders.   URINALYSIS W/ MICROSCOPIC IF INDICATED (NO CULTURE)   PROBNP (REFERENCE)   CBC AND DIFFERENTIAL    Narrative:     The following orders were created for panel order CBC & Differential.  Procedure                               Abnormality         Status                     ---------                               " -----------         ------                     CBC Auto Differential[[id number]]        Abnormal            Final result                 Please view results for these tests on the individual orders.   GREEN TOP   LAVENDER TOP   GOLD TOP - SST   GRAY TOP   LIGHT BLUE TOP       Meds Given in ED:   Medications   sodium chloride 0.9 % flush 10 mL (has no administration in time range)   ondansetron (ZOFRAN) injection 4 mg (4 mg Intravenous Given 11/17/24 1447)   Morphine sulfate (PF) injection 4 mg (4 mg Intravenous Given 11/17/24 1447)   iopamidol (ISOVUE-300) 61 % injection 100 mL (80 mL Intravenous Given 11/17/24 1511)   sodium chloride 0.9 % bolus 1,000 mL (1,000 mL Intravenous New Bag 11/17/24 1631)           Last NIH score:                                                          Dysphagia screening results:  Patient Factors Component (Dysphagia:Stroke or Rule-out)  Best Eye Response: 4-->(E4) spontaneous (11/17/24 1420)  Best Motor Response: 6-->(M6) obeys commands (11/17/24 1420)  Best Verbal Response: 5-->(V5) oriented (11/17/24 1420)  Hiland Coma Scale Score: 15 (11/17/24 1420)     Hiland Coma Scale:  No data recorded     CIWA:        Restraint Type:            Isolation Status:  No active isolations

## 2024-11-17 NOTE — H&P
"    [facility]  HISTORY AND PHYSICAL    Patient Name: Bebe Rincon  : 1980  MRN: [id number]  Primary Care Physician: Tracie Meraz APRN  Date of admission: 2024    Subjective   Subjective     Chief Complaint:  Epigastric pain, CP, n/v, diff swallowing solids    HPI:  Bebe Ricnon is a 44 y.o. female past medical history significant for asthma, bipolar 1, ADHD, HTN (recently started following with Dr. Lester, last seen 2024), GERD/veliz's (unsure of last scope). Pt on twice daily PPI x 1 year with worsening symptoms x 1 week.  Has developed 1 week history of increasing epigastric pain, chest pain, intermittent shortness of breath, nausea, vomiting, regurgitation, reflux, and now having difficulty keeping solid foods down, getting stuck in her throat.  States she has been \"eating tons of tums\", at least a bottle over the last 5-7 days.  On ER eval found to have elevated creatinine of 1.37 (baseline approximately 1), EKG with ST and PVCs (father with multivessel CAD in his 40s), CT A/P and CT chest with no acute finding.  Noted to have elevated serum calcium (with reported significant tums intake) and noted to be significantly hypertensive.  Most recent /118.  Reports she just started following with Dr. Lester with cardiology and has had multiple medication changes ongoing over the last week. Outpt w/u underway.     ER spoke with Dr. Hernandez with GI.  Will admit to hospital medicine service with GI to follow.  Plans for n.p.o. after midnight for EGD tomorrow.  Also plan to consult Dr. Lester tomorrow to further manage her ongoing resistant essential hypertension        Review of Systems   Constitutional:  Positive for activity change, appetite change, fatigue and unexpected weight change. Negative for chills, diaphoresis and fever.   HENT:  Positive for trouble swallowing.    Eyes: Negative.    Respiratory:  Positive for " choking, chest tightness and shortness of breath.    Cardiovascular:  Positive for chest pain and leg swelling. Negative for palpitations.   Gastrointestinal:  Positive for abdominal pain, nausea and vomiting. Negative for abdominal distention, anal bleeding, blood in stool and diarrhea.        Severe reflux   Endocrine: Negative.    Genitourinary: Negative.    Musculoskeletal: Negative.    Skin:  Positive for rash.        Intermittent red rash that itches to bilateral arms x 1-2 weeks    Allergic/Immunologic: Negative.    Neurological:  Negative for seizures, syncope, speech difficulty and headaches.   Hematological: Negative.    Psychiatric/Behavioral:  The patient is nervous/anxious.           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 Arroyo       Family History:  family history includes Arrhythmia in her father; Arthritis in her mother; Breast cancer in her paternal grandmother; Colon cancer in her maternal grandmother and paternal grandmother; Diabetes in her paternal grandmother; Early death in her paternal grandfather; Heart attack in her father; Heart disease in her father, maternal grandmother, and paternal uncle; Hyperlipidemia in her father; Hypertension in her brother, father, and mother; No Known Problems in her  daughter, daughter, and daughter; Thyroid cancer in her mother; Thyroid disease in her mother.     Social History:  reports that she quit smoking about 4 years ago. Her smoking use included cigarettes. She started smoking about 26 years ago. She has been exposed to tobacco smoke. She has never used smokeless tobacco. She reports that she does not currently use alcohol. She reports that she does not currently use drugs.  Social History     Social History Narrative    Caffeine: None       Medications:  DULoxetine, EPINEPHrine, FLUoxetine, Fluticasone-Salmeterol, NIFEdipine CC, Umeclidinium Bromide, albuterol sulfate HFA, buPROPion XL, butalbital-acetaminophen-caffeine, cloNIDine, furosemide, hydrALAZINE, hydrOXYzine, ibuprofen, meclizine, methylPREDNISolone, naloxone, nebivolol, pantoprazole, prenatal vitamin, promethazine, rOPINIRole, risperiDONE, topiramate, traZODone, triamterene-hydrochlorothiazide, valACYclovir, valsartan, and ziprasidone    Allergies   Allergen Reactions    Lamictal [Lamotrigine] Anaphylaxis    Amlodipine Irritability    Rocephin [Ceftriaxone] Hives    Tetracyclines & Related Other (See Comments)     Low h&h       Objective   Objective     Vital Signs:   Temp:  [98.2 °F (36.8 °C)] 98.2 °F (36.8 °C)  Heart Rate:  [101-118] 101  Resp:  [16] 16  BP: (167-192)/(114-144) 180/115    Physical Exam   Constitutional: Awake, alert.  Resting back in bed.  No acute distress.  Mother at bedside.  Eyes: PERRLA, sclerae anicteric, no conjunctival injection  HENT: NCAT, mucous membranes moist  Neck: Supple, no thyromegaly, no lymphadenopathy, trachea midline  Respiratory: Clear to auscultation bilaterally slightly decreased at bases, nonlabored respirations on room air with sats 96%.  Cardiovascular: Sinus tach, no murmurs, rubs, or gallops, palpable pedal pulses bilaterally  Gastrointestinal: Positive bowel sounds, soft, nondistended.  Moderate epigastric TTP.  No guarding or rebound.  Musculoskeletal: 1+ BLE  edema, no clubbing or cyanosis to extremities. GOLDBERG spont  Psychiatric: Appropriate affect, cooperative  Neurologic: Oriented x 3, strength symmetric in all extremities, Cranial Nerves grossly intact to confrontation, speech clear  Skin: No rashes      Result Review:  I have personally reviewed the results from the time of this admission to 11/17/2024 18:41 EST and agree with these findings:  [x]  Laboratory list / accordion  [x]  Microbiology  [x]  Radiology  [x]  EKG/Telemetry   [x]  Cardiology/Vascular   []  Pathology  [x]  Old records  []  Other:  Most notable findings include:     LAB RESULTS:      Lab 11/17/24  1421   WBC 9.67   HEMOGLOBIN 14.8   HEMATOCRIT 43.7   PLATELETS 365   NEUTROS ABS 7.07*   IMMATURE GRANS (ABS) 0.02   LYMPHS ABS 1.62   MONOS ABS 0.89   EOS ABS 0.03   MCV 87.2   D DIMER QUANT 0.38         Lab 11/17/24  1421   SODIUM 136   POTASSIUM 3.8   CHLORIDE 95*   CO2 25.0   ANION GAP 16.0*   BUN 24*   CREATININE 1.37*   EGFR 49.2*   GLUCOSE 98   CALCIUM 10.8*         Lab 11/17/24  1421   TOTAL PROTEIN 8.4   ALBUMIN 4.7   GLOBULIN 3.7   ALT (SGPT) 24   AST (SGOT) 22   BILIRUBIN 0.5   ALK PHOS 119*   LIPASE 21         Lab 11/17/24  1421   PROBNP 311.7  318.5   HSTROP T 9                 Brief Urine Lab Results  (Last result in the past 365 days)        Color   Clarity   Blood   Leuk Est   Nitrite   Protein   CREAT   Urine HCG        11/05/24 2207 Yellow   Cloudy   Negative   Trace   Negative   Trace                 Microbiology Results (last 10 days)       ** No results found for the last 240 hours. **            CT Chest With Contrast Diagnostic    Result Date: 11/17/2024  CT ABDOMEN PELVIS W CONTRAST, CT CHEST W CONTRAST DIAGNOSTIC Date of Exam: 11/17/2024 3:08 PM EST Indication: vomiting blood tinged bile. Comparison: 11/15/2009. Technique: Axial CT images were obtained of the chest, abdomen and pelvis following the uneventful intravenous administration of 80 mL Isovue-300 8. Reconstructed  coronal and sagittal images were also obtained. Automated exposure control and iterative construction methods were used. Findings: CT chest: There is no pathologic axillary adenopathy or other worrisome body wall soft tissue finding in the chest. There is no pleural or pericardial effusion. Nonaneurysmal thoracic aorta. There is no pathologic mediastinal or hilar adenopathy. The lung fields demonstrate no evidence of acute infectious process or distinct suspicious focal pulmonary nodularity. Central airways are clear. The osseous structures demonstrate no evidence of acute fracture or aggressive osseous lesion. CT abdomen pelvis: The liver, spleen, pancreas and bilateral adrenal glands demonstrate no acute or suspicious findings. The kidneys demonstrate symmetric nephrograms and contrast excretion without evidence of stones, hydronephrosis or nephropathy. Prior  cholecystectomy. Small and large bowel loops are nondilated. The appendix is normal. There is no free fluid or pneumoperitoneum. Nonaneurysmal abdominal aorta. There is no worrisome retroperitoneal lymphadenopathy. The pelvic viscera demonstrate no acute or suspicious findings. The osseous structures demonstrate no evidence of acute fracture or aggressive osseous lesion.     Impression: Impression: Essentially normal contrast-enhanced CT of the chest, abdomen and pelvis. No acute findings are present. Electronically Signed: Leobardo Browning MD  11/17/2024 3:52 PM EST  Workstation ID: [id number]    CT Abdomen Pelvis With Contrast    Result Date: 11/17/2024  CT ABDOMEN PELVIS W CONTRAST, CT CHEST W CONTRAST DIAGNOSTIC Date of Exam: 11/17/2024 3:08 PM EST Indication: vomiting blood tinged bile. Comparison: 11/15/2009. Technique: Axial CT images were obtained of the chest, abdomen and pelvis following the uneventful intravenous administration of 80 mL Isovue-300 8. Reconstructed coronal and sagittal images were also obtained. Automated exposure control and iterative  construction methods were used. Findings: CT chest: There is no pathologic axillary adenopathy or other worrisome body wall soft tissue finding in the chest. There is no pleural or pericardial effusion. Nonaneurysmal thoracic aorta. There is no pathologic mediastinal or hilar adenopathy. The lung fields demonstrate no evidence of acute infectious process or distinct suspicious focal pulmonary nodularity. Central airways are clear. The osseous structures demonstrate no evidence of acute fracture or aggressive osseous lesion. CT abdomen pelvis: The liver, spleen, pancreas and bilateral adrenal glands demonstrate no acute or suspicious findings. The kidneys demonstrate symmetric nephrograms and contrast excretion without evidence of stones, hydronephrosis or nephropathy. Prior  cholecystectomy. Small and large bowel loops are nondilated. The appendix is normal. There is no free fluid or pneumoperitoneum. Nonaneurysmal abdominal aorta. There is no worrisome retroperitoneal lymphadenopathy. The pelvic viscera demonstrate no acute or suspicious findings. The osseous structures demonstrate no evidence of acute fracture or aggressive osseous lesion.     Impression: Impression: Essentially normal contrast-enhanced CT of the chest, abdomen and pelvis. No acute findings are present. Electronically Signed: Leobardo Browning MD  11/17/2024 3:52 PM EST  Workstation ID: [id number]    XR Chest 1 View    Result Date: 11/17/2024  XR CHEST 1 VW Date of Exam: 11/17/2024 1:54 PM EST Indication: SOA triage protocol Comparison: August 28, 2024 Findings: Heart not enlarged. The lungs seem clear. There are no pleural effusions. The visualized osseous structures do not appear unusual.     Impression: Impression: 1.An acute pulmonary process is not apparent. Electronically Signed: Chaim Hoang MD  11/17/2024 2:21 PM EST  Workstation ID: [id number]     Results for orders placed during the hospital encounter of 12/29/20    Adult Transthoracic Echo  "Complete W/ Cont if Necessary Per Protocol    Interpretation Summary  · Calculated left ventricular EF = 55% Left ventricular systolic function is normal.  · Left ventricular wall thickness is consistent with mild concentric hypertrophy  · Cardiac valves appear structurally and functionally normal      Assessment & Plan   Assessment & Plan       Nausea & vomiting    Pharyngoesophageal dysphagia    Resistant hypertension    Elevated serum creatinine    Serum calcium elevated    Weight gain, abnormal    Epigastric abdominal pain    Hematemesis with nausea    Veliz esophagus    Bipolar 1 disorder    History of alcohol abuse      Bebe Rincon is a 44 y.o. female past medical history significant for asthma, bipolar 1, ADHD, HTN (recently started following with Dr. Lester, last seen 11/12/2024), GERD/veliz's (unsure of last scope). Pt on twice daily PPI x 1 year with worsening symptoms x 1 week.  Has developed 1 week history of increasing epigastric pain, chest pain, intermittent shortness of breath, nausea, vomiting, regurgitation, reflux, and now having difficulty keeping solid foods down, getting stuck in her throat.  States she has been \"eating tons of tums\", at least a bottle over the last 5-7 days.  On ER eval found to have elevated creatinine of 1.37 (baseline approximately 1), EKG with ST and PVCs (father with multivessel CAD in his 40s), CT A/P and CT chest with no acute finding.  Noted to have elevated serum calcium (with reported significant tums intake) and noted to be significantly hypertensive.  Most recent /118.  Reports she just started following with Dr. Lester with cardiology and has had multiple medication changes ongoing over the last week. Outpt w/u underway.     ER spoke with Dr. Hernandez with GI.  Will admit to hospital medicine service with GI to follow.  Plans for n.p.o. after midnight for EGD tomorrow.  Also plan to consult Dr. Lester tomorrow to further manage her ongoing " resistant essential hypertension    Assessment/plan:    Epigastric pain, N/V  Hx reflux-Salgado's esophagus  Severe worsening with dysphagia  --Unclear of when last endoscopy was noted positive Salgado's esophagus  -- Has been on PPI twice daily x 1 year  -- Worsening reflux symptoms x 1 week.  Now inability to effectively swallow solids, getting stuck and having nausea vomiting and blood-tinged emesis  -- States she has taken at least an entire bottle of Tums over the last 5 days or so with no relief  -- Symptom management  -- Continue IV PPI twice daily  -- N.p.o. after midnight for planned EGD tomorrow with Dr. Frost    Elevated serum calcium  -- Calcium elevated on presentation but patient does admit to taking at least a full bottle of Tums over the last 5 days or so.  Monitor.    CP/SOA  Hx resistant essential hypertension  Weight gain (30 LBS x 4 months)  --Troponin negative.  EKG is tach with PVCs.  No acute  -- Check proBNP  -- Daily weights  -- Telemetry  -- Continue outpatient BP medication regimen (adjusted last week by Dr. Lester)  -- Last TTE documented 12/2020 with EF 55%  -- Check echo  -- Consult Dr. Lester to follow in a.m. (See outpt cards note for current BP meds/changes ongoing)  -- Was scheduled for outpatient renal artery duplex 12/5/2024, will proceed with duplex while inpatient    Elevated creatinine  -- Creatinine appears to have been slowly trending up/eGFR trending down over the last several months.  -- Monitor.    Bipolar 1  ADHD  --Home bipolar meds as tolerated  -- Hold off on any stimulant for now    Hx asthma  Hx EtOH abuse (4 years sober)  Hx HSVII  -- Continue home antiviral      DVT prophylaxis: Sequentials    CODE STATUS:    Level Of Support Discussed With: Patient  Code Status (Patient has no pulse and is not breathing): CPR (Attempt to Resuscitate)  Medical Interventions (Patient has pulse or is breathing): Full Support      Expected Discharge   Expected Discharge Date:  11/19/2024; Expected Discharge Time:       Signature: Electronically signed by JOVANNY Holden, 11/17/24, 6:29 PM EST

## 2024-11-18 ENCOUNTER — APPOINTMENT (OUTPATIENT)
Dept: CARDIOLOGY | Facility: HOSPITAL | Age: 44
End: 2024-11-18
Payer: MEDICARE

## 2024-11-18 ENCOUNTER — ANESTHESIA (OUTPATIENT)
Dept: GASTROENTEROLOGY | Facility: HOSPITAL | Age: 44
End: 2024-11-18
Payer: MEDICARE

## 2024-11-18 LAB
ANION GAP SERPL CALCULATED.3IONS-SCNC: 12 MMOL/L (ref 5–15)
ASCENDING AORTA: 3.2 CM
BASOPHILS # BLD AUTO: 0.05 10*3/MM3 (ref 0–0.2)
BASOPHILS NFR BLD AUTO: 1 % (ref 0–1.5)
BH CV ECHO MEAS - AO MAX PG: 7.7 MMHG
BH CV ECHO MEAS - AO MEAN PG: 4.5 MMHG
BH CV ECHO MEAS - AO ROOT DIAM: 3.4 CM
BH CV ECHO MEAS - AO V2 MAX: 138.5 CM/SEC
BH CV ECHO MEAS - AO V2 VTI: 26.3 CM
BH CV ECHO MEAS - AVA(I,D): 2.24 CM2
BH CV ECHO MEAS - EDV(CUBED): 97.3 ML
BH CV ECHO MEAS - EDV(MOD-SP2): 88.7 ML
BH CV ECHO MEAS - EDV(MOD-SP4): 104 ML
BH CV ECHO MEAS - EF(MOD-BP): 60.7 %
BH CV ECHO MEAS - EF(MOD-SP2): 64.7 %
BH CV ECHO MEAS - EF(MOD-SP4): 56.1 %
BH CV ECHO MEAS - ESV(CUBED): 19.7 ML
BH CV ECHO MEAS - ESV(MOD-SP2): 31.3 ML
BH CV ECHO MEAS - ESV(MOD-SP4): 45.7 ML
BH CV ECHO MEAS - FS: 41.3 %
BH CV ECHO MEAS - IVS/LVPW: 1 CM
BH CV ECHO MEAS - IVSD: 1 CM
BH CV ECHO MEAS - LA DIMENSION: 3.3 CM
BH CV ECHO MEAS - LAT PEAK E' VEL: 13.8 CM/SEC
BH CV ECHO MEAS - LV MASS(C)D: 158.8 GRAMS
BH CV ECHO MEAS - LV MAX PG: 2.6 MMHG
BH CV ECHO MEAS - LV MEAN PG: 1 MMHG
BH CV ECHO MEAS - LV V1 MAX: 80.2 CM/SEC
BH CV ECHO MEAS - LV V1 VTI: 16.2 CM
BH CV ECHO MEAS - LVIDD: 4.6 CM
BH CV ECHO MEAS - LVIDS: 2.7 CM
BH CV ECHO MEAS - LVOT AREA: 3.6 CM2
BH CV ECHO MEAS - LVOT DIAM: 2.15 CM
BH CV ECHO MEAS - LVPWD: 1 CM
BH CV ECHO MEAS - MED PEAK E' VEL: 9.4 CM/SEC
BH CV ECHO MEAS - MV A MAX VEL: 63.4 CM/SEC
BH CV ECHO MEAS - MV DEC SLOPE: 369 CM/SEC2
BH CV ECHO MEAS - MV DEC TIME: 0.23 SEC
BH CV ECHO MEAS - MV E MAX VEL: 72.8 CM/SEC
BH CV ECHO MEAS - MV E/A: 1.15
BH CV ECHO MEAS - MV P1/2T: 77.4 MSEC
BH CV ECHO MEAS - MVA(P1/2T): 2.8 CM2
BH CV ECHO MEAS - PA ACC TIME: 0.09 SEC
BH CV ECHO MEAS - PA V2 MAX: 119 CM/SEC
BH CV ECHO MEAS - PAPD(PI EDV): 4 MMHG
BH CV ECHO MEAS - PI END-D VEL: 104 CM/SEC
BH CV ECHO MEAS - RAP SYSTOLE: 3 MMHG
BH CV ECHO MEAS - SV(LVOT): 58.8 ML
BH CV ECHO MEAS - SV(MOD-SP2): 57.4 ML
BH CV ECHO MEAS - SV(MOD-SP4): 58.3 ML
BH CV ECHO MEAS - TAPSE (>1.6): 2.43 CM
BH CV ECHO MEASUREMENTS AVERAGE E/E' RATIO: 6.28
BH CV XLRA - RV BASE: 3.9 CM
BH CV XLRA - RV LENGTH: 7.3 CM
BH CV XLRA - RV MID: 3.4 CM
BH CV XLRA - TDI S': 19.3 CM/SEC
BUN SERPL-MCNC: 20 MG/DL (ref 6–20)
BUN/CREAT SERPL: 20.4 (ref 7–25)
CALCIUM SPEC-SCNC: 8.9 MG/DL (ref 8.6–10.5)
CHLORIDE SERPL-SCNC: 99 MMOL/L (ref 98–107)
CO2 SERPL-SCNC: 25 MMOL/L (ref 22–29)
CREAT SERPL-MCNC: 0.98 MG/DL (ref 0.57–1)
DEPRECATED RDW RBC AUTO: 47.7 FL (ref 37–54)
EGFRCR SERPLBLD CKD-EPI 2021: 73.6 ML/MIN/1.73
EOSINOPHIL # BLD AUTO: 0.15 10*3/MM3 (ref 0–0.4)
EOSINOPHIL NFR BLD AUTO: 2.9 % (ref 0.3–6.2)
ERYTHROCYTE [DISTWIDTH] IN BLOOD BY AUTOMATED COUNT: 14.1 % (ref 12.3–15.4)
GLUCOSE BLDC GLUCOMTR-MCNC: 85 MG/DL (ref 70–130)
GLUCOSE SERPL-MCNC: 83 MG/DL (ref 65–99)
HBA1C MFR BLD: 5.1 % (ref 4.8–5.6)
HCT VFR BLD AUTO: 40.5 % (ref 34–46.6)
HGB BLD-MCNC: 13.3 G/DL (ref 12–15.9)
IMM GRANULOCYTES # BLD AUTO: 0.01 10*3/MM3 (ref 0–0.05)
IMM GRANULOCYTES NFR BLD AUTO: 0.2 % (ref 0–0.5)
IVRT: 74 MS
LEFT ATRIUM VOLUME INDEX: 20.3 ML/M2
LV EF 2D ECHO EST: 61 %
LYMPHOCYTES # BLD AUTO: 2.07 10*3/MM3 (ref 0.7–3.1)
LYMPHOCYTES NFR BLD AUTO: 39.9 % (ref 19.6–45.3)
MCH RBC QN AUTO: 30 PG (ref 26.6–33)
MCHC RBC AUTO-ENTMCNC: 32.8 G/DL (ref 31.5–35.7)
MCV RBC AUTO: 91.4 FL (ref 79–97)
MONOCYTES # BLD AUTO: 0.67 10*3/MM3 (ref 0.1–0.9)
MONOCYTES NFR BLD AUTO: 12.9 % (ref 5–12)
NEUTROPHILS NFR BLD AUTO: 2.24 10*3/MM3 (ref 1.7–7)
NEUTROPHILS NFR BLD AUTO: 43.1 % (ref 42.7–76)
NRBC BLD AUTO-RTO: 0 /100 WBC (ref 0–0.2)
PLATELET # BLD AUTO: 298 10*3/MM3 (ref 140–450)
PMV BLD AUTO: 9.7 FL (ref 6–12)
POTASSIUM SERPL-SCNC: 3.4 MMOL/L (ref 3.5–5.2)
POTASSIUM SERPL-SCNC: 4 MMOL/L (ref 3.5–5.2)
RBC # BLD AUTO: 4.43 10*6/MM3 (ref 3.77–5.28)
SODIUM SERPL-SCNC: 136 MMOL/L (ref 136–145)
WBC NRBC COR # BLD AUTO: 5.19 10*3/MM3 (ref 3.4–10.8)

## 2024-11-18 PROCEDURE — 63710000001 BUDESONIDE-FORMOTEROL 160-4.5 MCG/ACT AEROSOL 6 G INHALER: Performed by: INTERNAL MEDICINE

## 2024-11-18 PROCEDURE — 63710000001 HYDROCHLOROTHIAZIDE 25 MG TABLET: Performed by: INTERNAL MEDICINE

## 2024-11-18 PROCEDURE — A9270 NON-COVERED ITEM OR SERVICE: HCPCS | Performed by: INTERNAL MEDICINE

## 2024-11-18 PROCEDURE — 93005 ELECTROCARDIOGRAM TRACING: CPT | Performed by: ANESTHESIOLOGY

## 2024-11-18 PROCEDURE — 25010000002 DIPHENHYDRAMINE PER 50 MG: Performed by: INTERNAL MEDICINE

## 2024-11-18 PROCEDURE — 93975 VASCULAR STUDY: CPT

## 2024-11-18 PROCEDURE — 99215 OFFICE O/P EST HI 40 MIN: CPT | Performed by: NURSE PRACTITIONER

## 2024-11-18 PROCEDURE — 94799 UNLISTED PULMONARY SVC/PX: CPT

## 2024-11-18 PROCEDURE — 93010 ELECTROCARDIOGRAM REPORT: CPT | Performed by: INTERNAL MEDICINE

## 2024-11-18 PROCEDURE — 25010000002 ONDANSETRON PER 1 MG: Performed by: INTERNAL MEDICINE

## 2024-11-18 PROCEDURE — 25010000002 PROCHLORPERAZINE 10 MG/2ML SOLUTION: Performed by: INTERNAL MEDICINE

## 2024-11-18 PROCEDURE — 25010000002 LIDOCAINE PF 1% 1 % SOLUTION

## 2024-11-18 PROCEDURE — 96376 TX/PRO/DX INJ SAME DRUG ADON: CPT

## 2024-11-18 PROCEDURE — 84132 ASSAY OF SERUM POTASSIUM: CPT | Performed by: INTERNAL MEDICINE

## 2024-11-18 PROCEDURE — 63710000001 PANTOPRAZOLE 40 MG TABLET DELAYED-RELEASE: Performed by: INTERNAL MEDICINE

## 2024-11-18 PROCEDURE — 96375 TX/PRO/DX INJ NEW DRUG ADDON: CPT

## 2024-11-18 PROCEDURE — G0378 HOSPITAL OBSERVATION PER HR: HCPCS

## 2024-11-18 PROCEDURE — 25010000002 HYDROMORPHONE 1 MG/ML SOLUTION: Performed by: INTERNAL MEDICINE

## 2024-11-18 PROCEDURE — 63710000001 ZIPRASIDONE 20 MG CAPSULE: Performed by: INTERNAL MEDICINE

## 2024-11-18 PROCEDURE — 63710000001 NIFEDIPINE XL 30 MG TABLET SUSTAINED-RELEASE 24 HOUR: Performed by: INTERNAL MEDICINE

## 2024-11-18 PROCEDURE — 63710000001 NEBIVOLOL 2.5 MG TABLET: Performed by: INTERNAL MEDICINE

## 2024-11-18 PROCEDURE — 88305 TISSUE EXAM BY PATHOLOGIST: CPT | Performed by: INTERNAL MEDICINE

## 2024-11-18 PROCEDURE — 93306 TTE W/DOPPLER COMPLETE: CPT | Performed by: INTERNAL MEDICINE

## 2024-11-18 PROCEDURE — 63710000001 HYDRALAZINE 50 MG TABLET: Performed by: INTERNAL MEDICINE

## 2024-11-18 PROCEDURE — 63710000001 POTASSIUM CHLORIDE 20 MEQ TABLET CONTROLLED-RELEASE: Performed by: INTERNAL MEDICINE

## 2024-11-18 PROCEDURE — 25010000002 MORPHINE PER 10 MG: Performed by: NURSE PRACTITIONER

## 2024-11-18 PROCEDURE — 99233 SBSQ HOSP IP/OBS HIGH 50: CPT | Performed by: INTERNAL MEDICINE

## 2024-11-18 PROCEDURE — 93306 TTE W/DOPPLER COMPLETE: CPT

## 2024-11-18 PROCEDURE — 82948 REAGENT STRIP/BLOOD GLUCOSE: CPT

## 2024-11-18 PROCEDURE — 25010000002 ONDANSETRON PER 1 MG: Performed by: NURSE PRACTITIONER

## 2024-11-18 PROCEDURE — 88342 IMHCHEM/IMCYTCHM 1ST ANTB: CPT | Performed by: INTERNAL MEDICINE

## 2024-11-18 PROCEDURE — 80048 BASIC METABOLIC PNL TOTAL CA: CPT | Performed by: NURSE PRACTITIONER

## 2024-11-18 PROCEDURE — 63710000001 FLUOXETINE 20 MG CAPSULE: Performed by: INTERNAL MEDICINE

## 2024-11-18 PROCEDURE — 63710000001 SUCRALFATE 1 G TABLET: Performed by: INTERNAL MEDICINE

## 2024-11-18 PROCEDURE — 99214 OFFICE O/P EST MOD 30 MIN: CPT | Performed by: INTERNAL MEDICINE

## 2024-11-18 PROCEDURE — 25010000002 PROPOFOL 10 MG/ML EMULSION

## 2024-11-18 PROCEDURE — 63710000001 ROPINIROLE 1 MG TABLET: Performed by: INTERNAL MEDICINE

## 2024-11-18 PROCEDURE — 93975 VASCULAR STUDY: CPT | Performed by: INTERNAL MEDICINE

## 2024-11-18 PROCEDURE — 85025 COMPLETE CBC W/AUTO DIFF WBC: CPT | Performed by: NURSE PRACTITIONER

## 2024-11-18 PROCEDURE — 43239 EGD BIOPSY SINGLE/MULTIPLE: CPT | Performed by: INTERNAL MEDICINE

## 2024-11-18 PROCEDURE — 63710000001 BUPROPION XL 150 MG TABLET SUSTAINED-RELEASE 24 HOUR: Performed by: INTERNAL MEDICINE

## 2024-11-18 PROCEDURE — 83036 HEMOGLOBIN GLYCOSYLATED A1C: CPT | Performed by: NURSE PRACTITIONER

## 2024-11-18 RX ORDER — SUCRALFATE 1 G/1
1 TABLET ORAL
Status: DISCONTINUED | OUTPATIENT
Start: 2024-11-18 | End: 2024-11-19 | Stop reason: HOSPADM

## 2024-11-18 RX ORDER — FAMOTIDINE 20 MG/1
20 TABLET, FILM COATED ORAL ONCE
Status: DISCONTINUED | OUTPATIENT
Start: 2024-11-18 | End: 2024-11-18

## 2024-11-18 RX ORDER — BUPROPION HYDROCHLORIDE 150 MG/1
150 TABLET ORAL DAILY
Status: DISCONTINUED | OUTPATIENT
Start: 2024-11-18 | End: 2024-11-19 | Stop reason: HOSPADM

## 2024-11-18 RX ORDER — PROPOFOL 10 MG/ML
VIAL (ML) INTRAVENOUS AS NEEDED
Status: DISCONTINUED | OUTPATIENT
Start: 2024-11-18 | End: 2024-11-18 | Stop reason: SURG

## 2024-11-18 RX ORDER — POTASSIUM CHLORIDE 1500 MG/1
40 TABLET, EXTENDED RELEASE ORAL EVERY 4 HOURS
Status: COMPLETED | OUTPATIENT
Start: 2024-11-18 | End: 2024-11-18

## 2024-11-18 RX ORDER — BUDESONIDE AND FORMOTEROL FUMARATE DIHYDRATE 160; 4.5 UG/1; UG/1
1 AEROSOL RESPIRATORY (INHALATION)
Status: DISCONTINUED | OUTPATIENT
Start: 2024-11-18 | End: 2024-11-19 | Stop reason: HOSPADM

## 2024-11-18 RX ORDER — NEBIVOLOL 2.5 MG/1
5 TABLET ORAL
Status: DISCONTINUED | OUTPATIENT
Start: 2024-11-18 | End: 2024-11-19 | Stop reason: HOSPADM

## 2024-11-18 RX ORDER — VALACYCLOVIR HYDROCHLORIDE 500 MG/1
1000 TABLET, FILM COATED ORAL DAILY
Status: DISCONTINUED | OUTPATIENT
Start: 2024-11-18 | End: 2024-11-19 | Stop reason: HOSPADM

## 2024-11-18 RX ORDER — LIDOCAINE HYDROCHLORIDE 10 MG/ML
INJECTION, SOLUTION EPIDURAL; INFILTRATION; INTRACAUDAL; PERINEURAL AS NEEDED
Status: DISCONTINUED | OUTPATIENT
Start: 2024-11-18 | End: 2024-11-18 | Stop reason: SURG

## 2024-11-18 RX ORDER — SODIUM CHLORIDE 0.9 % (FLUSH) 0.9 %
10 SYRINGE (ML) INJECTION AS NEEDED
Status: DISCONTINUED | OUTPATIENT
Start: 2024-11-18 | End: 2024-11-19 | Stop reason: HOSPADM

## 2024-11-18 RX ORDER — SODIUM CHLORIDE 0.9 % (FLUSH) 0.9 %
10 SYRINGE (ML) INJECTION EVERY 12 HOURS SCHEDULED
Status: DISCONTINUED | OUTPATIENT
Start: 2024-11-18 | End: 2024-11-19 | Stop reason: HOSPADM

## 2024-11-18 RX ORDER — MIDAZOLAM HYDROCHLORIDE 1 MG/ML
1 INJECTION, SOLUTION INTRAMUSCULAR; INTRAVENOUS
Status: DISCONTINUED | OUTPATIENT
Start: 2024-11-18 | End: 2024-11-18

## 2024-11-18 RX ORDER — ROPINIROLE 1 MG/1
1 TABLET, FILM COATED ORAL NIGHTLY
Status: DISCONTINUED | OUTPATIENT
Start: 2024-11-18 | End: 2024-11-19 | Stop reason: HOSPADM

## 2024-11-18 RX ORDER — FAMOTIDINE 10 MG/ML
20 INJECTION, SOLUTION INTRAVENOUS ONCE
Status: DISCONTINUED | OUTPATIENT
Start: 2024-11-18 | End: 2024-11-18

## 2024-11-18 RX ORDER — HYDROCHLOROTHIAZIDE 25 MG/1
12.5 TABLET ORAL DAILY
Status: DISCONTINUED | OUTPATIENT
Start: 2024-11-18 | End: 2024-11-19 | Stop reason: HOSPADM

## 2024-11-18 RX ORDER — IPRATROPIUM BROMIDE AND ALBUTEROL SULFATE 2.5; .5 MG/3ML; MG/3ML
3 SOLUTION RESPIRATORY (INHALATION) ONCE AS NEEDED
Status: DISCONTINUED | OUTPATIENT
Start: 2024-11-18 | End: 2024-11-18 | Stop reason: HOSPADM

## 2024-11-18 RX ORDER — PANTOPRAZOLE SODIUM 40 MG/1
40 TABLET, DELAYED RELEASE ORAL
Status: DISCONTINUED | OUTPATIENT
Start: 2024-11-18 | End: 2024-11-19 | Stop reason: HOSPADM

## 2024-11-18 RX ORDER — LIDOCAINE HYDROCHLORIDE 10 MG/ML
0.5 INJECTION, SOLUTION EPIDURAL; INFILTRATION; INTRACAUDAL; PERINEURAL ONCE AS NEEDED
Status: DISCONTINUED | OUTPATIENT
Start: 2024-11-18 | End: 2024-11-18

## 2024-11-18 RX ORDER — BUPROPION HYDROCHLORIDE 150 MG/1
300 TABLET ORAL DAILY
Status: DISCONTINUED | OUTPATIENT
Start: 2024-11-18 | End: 2024-11-19 | Stop reason: HOSPADM

## 2024-11-18 RX ORDER — DIPHENHYDRAMINE HYDROCHLORIDE 50 MG/ML
12.5 INJECTION INTRAMUSCULAR; INTRAVENOUS EVERY 6 HOURS PRN
Status: DISCONTINUED | OUTPATIENT
Start: 2024-11-18 | End: 2024-11-19 | Stop reason: HOSPADM

## 2024-11-18 RX ADMIN — PROPOFOL 50 MG: 10 INJECTION, EMULSION INTRAVENOUS at 09:50

## 2024-11-18 RX ADMIN — SUCRALFATE 1 G: 1 TABLET ORAL at 18:05

## 2024-11-18 RX ADMIN — SUCRALFATE 1 G: 1 TABLET ORAL at 21:41

## 2024-11-18 RX ADMIN — PROCHLORPERAZINE EDISYLATE 10 MG: 5 INJECTION INTRAMUSCULAR; INTRAVENOUS at 07:30

## 2024-11-18 RX ADMIN — Medication 10 ML: at 11:29

## 2024-11-18 RX ADMIN — NEBIVOLOL 5 MG: 2.5 TABLET ORAL at 15:15

## 2024-11-18 RX ADMIN — HYDROCHLOROTHIAZIDE 12.5 MG: 25 TABLET ORAL at 15:07

## 2024-11-18 RX ADMIN — LIDOCAINE HYDROCHLORIDE 100 MG: 10 INJECTION, SOLUTION EPIDURAL; INFILTRATION; INTRACAUDAL; PERINEURAL at 09:40

## 2024-11-18 RX ADMIN — ONDANSETRON 4 MG: 2 INJECTION INTRAMUSCULAR; INTRAVENOUS at 10:55

## 2024-11-18 RX ADMIN — ROPINIROLE 1 MG: 1 TABLET, FILM COATED ORAL at 21:41

## 2024-11-18 RX ADMIN — POTASSIUM CHLORIDE 40 MEQ: 1500 TABLET, EXTENDED RELEASE ORAL at 15:05

## 2024-11-18 RX ADMIN — ZIPRASIDONE HYDROCHLORIDE 40 MG: 20 CAPSULE ORAL at 21:41

## 2024-11-18 RX ADMIN — PROPOFOL 50 MG: 10 INJECTION, EMULSION INTRAVENOUS at 09:47

## 2024-11-18 RX ADMIN — PROPOFOL 50 MG: 10 INJECTION, EMULSION INTRAVENOUS at 09:40

## 2024-11-18 RX ADMIN — BUDESONIDE AND FORMOTEROL FUMARATE DIHYDRATE 1 PUFF: 160; 4.5 AEROSOL RESPIRATORY (INHALATION) at 14:15

## 2024-11-18 RX ADMIN — POTASSIUM CHLORIDE 40 MEQ: 1500 TABLET, EXTENDED RELEASE ORAL at 18:03

## 2024-11-18 RX ADMIN — DIPHENHYDRAMINE HYDROCHLORIDE 12.5 MG: 50 INJECTION INTRAMUSCULAR; INTRAVENOUS at 15:20

## 2024-11-18 RX ADMIN — Medication 10 ML: at 08:40

## 2024-11-18 RX ADMIN — BUPROPION HYDROCHLORIDE 300 MG: 150 TABLET, EXTENDED RELEASE ORAL at 15:12

## 2024-11-18 RX ADMIN — Medication 10 ML: at 21:43

## 2024-11-18 RX ADMIN — MORPHINE SULFATE 2 MG: 2 INJECTION, SOLUTION INTRAMUSCULAR; INTRAVENOUS at 00:29

## 2024-11-18 RX ADMIN — NIFEDIPINE 30 MG: 30 TABLET, FILM COATED, EXTENDED RELEASE ORAL at 10:51

## 2024-11-18 RX ADMIN — DIPHENHYDRAMINE HYDROCHLORIDE 12.5 MG: 50 INJECTION INTRAMUSCULAR; INTRAVENOUS at 08:37

## 2024-11-18 RX ADMIN — HYDROMORPHONE HYDROCHLORIDE 1 MG: 1 INJECTION, SOLUTION INTRAMUSCULAR; INTRAVENOUS; SUBCUTANEOUS at 03:18

## 2024-11-18 RX ADMIN — PROPOFOL 50 MG: 10 INJECTION, EMULSION INTRAVENOUS at 09:42

## 2024-11-18 RX ADMIN — PANTOPRAZOLE SODIUM 40 MG: 40 INJECTION, POWDER, FOR SOLUTION INTRAVENOUS at 08:37

## 2024-11-18 RX ADMIN — SUCRALFATE 1 G: 1 TABLET ORAL at 10:54

## 2024-11-18 RX ADMIN — FLUOXETINE HYDROCHLORIDE 40 MG: 20 CAPSULE ORAL at 15:03

## 2024-11-18 RX ADMIN — BUPROPION HYDROCHLORIDE 150 MG: 150 TABLET, EXTENDED RELEASE ORAL at 15:04

## 2024-11-18 RX ADMIN — HYDRALAZINE HYDROCHLORIDE 50 MG: 50 TABLET ORAL at 11:28

## 2024-11-18 RX ADMIN — PANTOPRAZOLE SODIUM 40 MG: 40 TABLET, DELAYED RELEASE ORAL at 18:04

## 2024-11-18 RX ADMIN — PROCHLORPERAZINE EDISYLATE 10 MG: 5 INJECTION INTRAMUSCULAR; INTRAVENOUS at 15:01

## 2024-11-18 RX ADMIN — MORPHINE SULFATE 2 MG: 2 INJECTION, SOLUTION INTRAMUSCULAR; INTRAVENOUS at 07:30

## 2024-11-18 RX ADMIN — DIPHENHYDRAMINE HYDROCHLORIDE 12.5 MG: 50 INJECTION INTRAMUSCULAR; INTRAVENOUS at 03:18

## 2024-11-18 RX ADMIN — BUDESONIDE AND FORMOTEROL FUMARATE DIHYDRATE 1 PUFF: 160; 4.5 AEROSOL RESPIRATORY (INHALATION) at 23:43

## 2024-11-18 NOTE — PROGRESS NOTES
[facility] Medicine Services  PROGRESS NOTE    Patient Name: Bebe Rincon  : 1980  MRN: [id number]    Date of Admission: 2024  Primary Care Physician: Tracie Meraz APRN    Subjective   Subjective     CC:  Epigastric pain, dysphagia    HPI:  Pt just got back from EGD. Denies any complaints currently. Mother at bedside.       Objective   Objective     Vital Signs:   Temp:  [98 °F (36.7 °C)-98.3 °F (36.8 °C)] 98 °F (36.7 °C)  Heart Rate:  [] 77  Resp:  [16] 16  BP: (130-192)/() 155/100     Physical Exam:  Constitutional: No acute distress, awake, alert  HENT: NCAT, mucous membranes moist  Respiratory: Clear to auscultation bilaterally, respiratory effort normal   Cardiovascular: RRR, no murmurs, rubs, or gallops  Gastrointestinal: Positive bowel sounds, soft, nontender, nondistended  Musculoskeletal: No bilateral ankle edema  Psychiatric: Appropriate affect, cooperative  Neurologic: Oriented x 3, strength symmetric in all extremities, Cranial Nerves grossly intact to confrontation, speech clear  Skin: No rashes     Results Reviewed:  LAB RESULTS:      Lab 24  1421   WBC 9.67   HEMOGLOBIN 14.8   HEMATOCRIT 43.7   PLATELETS 365   NEUTROS ABS 7.07*   IMMATURE GRANS (ABS) 0.02   LYMPHS ABS 1.62   MONOS ABS 0.89   EOS ABS 0.03   MCV 87.2   D DIMER QUANT 0.38   HSTROP T 9         Lab 24  1421   SODIUM 136   POTASSIUM 3.8   CHLORIDE 95*   CO2 25.0   ANION GAP 16.0*   BUN 24*   CREATININE 1.37*   EGFR 49.2*   GLUCOSE 98   CALCIUM 10.8*         Lab 24  1421   TOTAL PROTEIN 8.4   ALBUMIN 4.7   GLOBULIN 3.7   ALT (SGPT) 24   AST (SGOT) 22   BILIRUBIN 0.5   ALK PHOS 119*   LIPASE 21         Lab 24  1421   PROBNP 311.7  318.5   HSTROP T 9                 Brief Urine Lab Results  (Last result in the past 365 days)        Color   Clarity   Blood   Leuk Est   Nitrite   Protein   CREAT   Urine HCG        24 1835 Yellow   Cloudy    Negative   Small (1+)   Negative   Trace                   Microbiology Results Abnormal       None            CT Chest With Contrast Diagnostic    Result Date: 11/17/2024  CT ABDOMEN PELVIS W CONTRAST, CT CHEST W CONTRAST DIAGNOSTIC Date of Exam: 11/17/2024 3:08 PM EST Indication: vomiting blood tinged bile. Comparison: 11/15/2009. Technique: Axial CT images were obtained of the chest, abdomen and pelvis following the uneventful intravenous administration of 80 mL Isovue-300 8. Reconstructed coronal and sagittal images were also obtained. Automated exposure control and iterative construction methods were used. Findings: CT chest: There is no pathologic axillary adenopathy or other worrisome body wall soft tissue finding in the chest. There is no pleural or pericardial effusion. Nonaneurysmal thoracic aorta. There is no pathologic mediastinal or hilar adenopathy. The lung fields demonstrate no evidence of acute infectious process or distinct suspicious focal pulmonary nodularity. Central airways are clear. The osseous structures demonstrate no evidence of acute fracture or aggressive osseous lesion. CT abdomen pelvis: The liver, spleen, pancreas and bilateral adrenal glands demonstrate no acute or suspicious findings. The kidneys demonstrate symmetric nephrograms and contrast excretion without evidence of stones, hydronephrosis or nephropathy. Prior  cholecystectomy. Small and large bowel loops are nondilated. The appendix is normal. There is no free fluid or pneumoperitoneum. Nonaneurysmal abdominal aorta. There is no worrisome retroperitoneal lymphadenopathy. The pelvic viscera demonstrate no acute or suspicious findings. The osseous structures demonstrate no evidence of acute fracture or aggressive osseous lesion.     Impression: Impression: Essentially normal contrast-enhanced CT of the chest, abdomen and pelvis. No acute findings are present. Electronically Signed: Leobardo Browning MD  11/17/2024 3:52 PM EST   Workstation ID: [id number]    CT Abdomen Pelvis With Contrast    Result Date: 11/17/2024  CT ABDOMEN PELVIS W CONTRAST, CT CHEST W CONTRAST DIAGNOSTIC Date of Exam: 11/17/2024 3:08 PM EST Indication: vomiting blood tinged bile. Comparison: 11/15/2009. Technique: Axial CT images were obtained of the chest, abdomen and pelvis following the uneventful intravenous administration of 80 mL Isovue-300 8. Reconstructed coronal and sagittal images were also obtained. Automated exposure control and iterative construction methods were used. Findings: CT chest: There is no pathologic axillary adenopathy or other worrisome body wall soft tissue finding in the chest. There is no pleural or pericardial effusion. Nonaneurysmal thoracic aorta. There is no pathologic mediastinal or hilar adenopathy. The lung fields demonstrate no evidence of acute infectious process or distinct suspicious focal pulmonary nodularity. Central airways are clear. The osseous structures demonstrate no evidence of acute fracture or aggressive osseous lesion. CT abdomen pelvis: The liver, spleen, pancreas and bilateral adrenal glands demonstrate no acute or suspicious findings. The kidneys demonstrate symmetric nephrograms and contrast excretion without evidence of stones, hydronephrosis or nephropathy. Prior  cholecystectomy. Small and large bowel loops are nondilated. The appendix is normal. There is no free fluid or pneumoperitoneum. Nonaneurysmal abdominal aorta. There is no worrisome retroperitoneal lymphadenopathy. The pelvic viscera demonstrate no acute or suspicious findings. The osseous structures demonstrate no evidence of acute fracture or aggressive osseous lesion.     Impression: Impression: Essentially normal contrast-enhanced CT of the chest, abdomen and pelvis. No acute findings are present. Electronically Signed: Leobardo Browning MD  11/17/2024 3:52 PM EST  Workstation ID: [id number]    XR Chest 1 View    Result Date: 11/17/2024  XR CHEST 1 VW  Date of Exam: 11/17/2024 1:54 PM EST Indication: SOA triage protocol Comparison: August 28, 2024 Findings: Heart not enlarged. The lungs seem clear. There are no pleural effusions. The visualized osseous structures do not appear unusual.     Impression: Impression: 1.An acute pulmonary process is not apparent. Electronically Signed: Chaim Hoang MD  11/17/2024 2:21 PM EST  Workstation ID: [id number]     Results for orders placed during the hospital encounter of 12/29/20    Adult Transthoracic Echo Complete W/ Cont if Necessary Per Protocol    Interpretation Summary  · Calculated left ventricular EF = 55% Left ventricular systolic function is normal.  · Left ventricular wall thickness is consistent with mild concentric hypertrophy  · Cardiac valves appear structurally and functionally normal      Current medications:  Scheduled Meds:NIFEdipine XL, 30 mg, Oral, Daily  pantoprazole, 40 mg, Intravenous, Q12H  sodium chloride, 10 mL, Intravenous, Q12H  [Held by provider] triamterene-hydrochlorothiazide, 1 tablet, Oral, Daily  [Held by provider] valsartan, 160 mg, Oral, Daily  ziprasidone, 40 mg, Oral, Nightly      Continuous Infusions:   PRN Meds:.  acetaminophen **OR** acetaminophen **OR** acetaminophen    albuterol sulfate HFA    senna-docusate sodium **AND** polyethylene glycol **AND** bisacodyl **AND** bisacodyl    diphenhydrAMINE    diphenhydrAMINE-zinc acetate    hydrALAZINE    HYDROmorphone    Morphine **AND** naloxone    ondansetron ODT **OR** ondansetron    prochlorperazine    promethazine    sodium chloride    sodium chloride    sodium chloride    Assessment & Plan   Assessment & Plan     Active Hospital Problems    Diagnosis  POA    **Nausea & vomiting [R11.2]  Yes    Epigastric abdominal pain [R10.13]  Yes    Hematemesis with nausea [K92.0]  Yes    Pharyngoesophageal dysphagia [R13.14]  Yes    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        Brief Hospital Course to date:  Bebe Rincon is a 44 y.o. female with past medical history significant for asthma, bipolar 1, ADHD, HTN (recently started following with Dr. Lester, last seen 11/12/2024), GERD/veliz's (unsure of last scope) who presented with one week of worsening dyspepsia associated with N/V as well as dysphagia despite being on BID PPI for the last year.    Plan:    Dyspepsia  Hx reflux-Veliz's esophagus  Dysphagia to solids   --Unclear of when last endoscopy was, buy noted positive Veliz's esophagus  -- Has been on PPI twice daily x 1 year  -- States she has taken at least an entire bottle of Tums over the last 5 days or so with no relief  -- Symptom management  -- EGD with erosive esophagitis, biopsies PENDING. Continue with PPI BID and start Carafate QID x 1 week. Needs follow up with her GI (Dr. Orosco).        CP/SOA  Hx resistant essential hypertension  Weight gain (30 LBS x 4 months)  --Troponin negative.  EKG shows tachycardia with PVCs.  No acute changes  -- Check proBNP  -- Daily weights  -- Telemetry  -- Continue outpatient BP medication regimen (adjusted last week by Dr. Lester)  -- Last TTE documented 12/2020 with EF 55%  -- Check echo  -- Consulted Dr. Lester (See outpt cards note for current BP meds/changes ongoing)  -- Was scheduled for outpatient renal artery duplex 12/5/2024, will proceed with duplex while inpatient  -- stop Triamterene/HCTZ combo.  Will start Losartan 160 mg, HCTZ 12.5 mg and Nebivolol 5 mg daily as per Cards recs.  Continue Nifedipine 30 mg per home meds (and per Cards recs)  -- can likely go home tomorrow if Cr is stable on currently regimen and after renal artery duplex     Elevated creatinine  -- Creatinine appears to  have been slowly trending up/eGFR trending down over the last several months.  -- Monitor. Better today  -- adding the above-mentioned meds, would monitor Cr with initiation of above      Bipolar 1  ADHD  --Home bipolar meds as tolerated  -- Hold off on any stimulant for now     Hx asthma  Hx EtOH abuse (4 years sober)  Hx HSVII  -- Continue home antiviral      Total time spent: Time Spent: Time Spent: 35 minutes  Time spent includes time reviewing chart, face-to-face time, counseling patient/family/caregiver, ordering medications/tests/procedures, communicating with other health care professionals, documenting clinical information in the electronic health record, and coordination of care.      Expected Discharge Location and Transportation: home  Expected Discharge   Expected Discharge Date: 11/19/2024; Expected Discharge Time:      VTE Prophylaxis:  Mechanical VTE prophylaxis orders are present.         AM-PAC 6 Clicks Score (PT): 24 (11/17/24 2000)    CODE STATUS:   Code Status and Medical Interventions: CPR (Attempt to Resuscitate); Full Support   Ordered at: 11/17/24 8084     Level Of Support Discussed With:    Patient     Code Status (Patient has no pulse and is not breathing):    CPR (Attempt to Resuscitate)     Medical Interventions (Patient has pulse or is breathing):    Full Support       Sonia Lynch MD  11/18/24

## 2024-11-18 NOTE — CASE MANAGEMENT/SOCIAL WORK
Discharge Planning Assessment  [facility]     Patient Name: Bebe Rincon  MRN: [id number]  Today's Date: 11/18/2024    Admit Date: 11/17/2024    Plan: Home with parents   Discharge Needs Assessment       Row Name 11/18/24 1142       Living Environment    People in Home parent(s)    Name(s) of People in Home Denise (mother) [phone]    Current Living Arrangements home    Potentially Unsafe Housing Conditions none    In the past 12 months has the electric, gas, oil, or water company threatened to shut off services in your home? No    Primary Care Provided by self    Provides Primary Care For no one    Family Caregiver if Needed parent(s)    Able to Return to Prior Arrangements yes       Resource/Environmental Concerns    Resource/Environmental Concerns none    Transportation Concerns none       Transportation Needs    In the past 12 months, has lack of transportation kept you from medical appointments or from getting medications? no    In the past 12 months, has lack of transportation kept you from meetings, work, or from getting things needed for daily living? No       Food Insecurity    Within the past 12 months, you worried that your food would run out before you got the money to buy more. Never true    Within the past 12 months, the food you bought just didn't last and you didn't have money to get more. Never true       Transition Planning    Patient/Family Anticipates Transition to home with family    Patient/Family Anticipated Services at Transition none    Transportation Anticipated family or friend will provide       Discharge Needs Assessment    Readmission Within the Last 30 Days no previous admission in last 30 days    Equipment Currently Used at Home bp cuff    Concerns to be Addressed denies needs/concerns at this time    Do you want help finding or keeping work or a job? I do not need or want help    Do you want help with school or training? For example, starting or completing job  training or getting a high school diploma, GED or equivalent No    Anticipated Changes Related to Illness none    Equipment Needed After Discharge none                   Discharge Plan       Row Name 11/18/24 1143       Plan    Plan Home with parents    Patient/Family in Agreement with Plan yes    Plan Comments Spoke to Denise flores by phone. Lives with Denise (mother) [phone] in [place]. Is independent with ADL's. No problems affording [employer] Medicare/[place] Medicaid or medications. Uses [facility] Pharmacy. Uses no DME. PCP is JOVANNY Joseph. Plan is home with parents. Parents will transport. CM will continue to follow.    Final Discharge Disposition Code 01 - home or self-care      Row Name 11/18/24 0858       Plan    Final Discharge Disposition Code 01 - home or self-care                  Continued Care and Services - Admitted Since 11/17/2024    No active coordination exists for this encounter.       Expected Discharge Date and Time       Expected Discharge Date Expected Discharge Time    Nov 19, 2024            Demographic Summary       Row Name 11/18/24 1140       General Information    Admission Type observation    Arrived From emergency department    Referral Source admission list    Reason for Consult discharge planning    Preferred Language English       Contact Information    Permission Granted to Share Info With     Contact Information Obtained for                    Functional Status       Row Name 11/18/24 1140       Functional Status    Usual Activity Tolerance good    Current Activity Tolerance good       Physical Activity    On average, how many days per week do you engage in moderate to strenuous exercise (like a brisk walk)? 5 days    On average, how many minutes do you engage in exercise at this level? 20 min    Number of minutes of exercise per week 100       Functional Status, IADL    Medications independent    Meal Preparation independent     Housekeeping independent    Laundry independent    Shopping independent    If for any reason you need help with day-to-day activities such as bathing, preparing meals, shopping, managing finances, etc., do you get the help you need? I don't need any help       Mental Status    General Appearance WDL WDL       Mental Status Summary    Recent Changes in Mental Status/Cognitive Functioning no changes       Employment/    Employment Status disabled                   Psychosocial    No documentation.                  Abuse/Neglect    No documentation.                  Legal    No documentation.                  Substance Abuse    No documentation.                  Patient Forms    No documentation.                     Sebas Parson RN

## 2024-11-18 NOTE — PLAN OF CARE
Goal Outcome Evaluation:      Good day, multiple tests. Nausea and pain well controlled. BP still a small concern. New meds appear to be helping. Pt looking forward to discharging tomrrow.

## 2024-11-18 NOTE — CONSULTS
[facility]:  Inpatient Gastroenterology Consult      Inpatient Gastroenterology Consult  Consult performed by: Delmer Garsia APRN  Consult ordered by: Kimberly Morgan APRN  Reason for consult: Coffee-ground emesis, dysphagia      Referring Provider: No ref. provider found    PCP: Tracie Meraz APRN    Chief Complaint: Vomiting blood, difficulty swallowing    History of present illness:    Bebe Rincon is a 44 y.o. female who is admitted with worsening epigastric pain and associated nausea, vomiting, hematemesis, and dysphagia.  Patient notes that approximately 2 weeks ago she saw blood in her stool and describes this as fresh red in nature.  Notes over the last week or so she has had ongoing progressive worsening epigastric pain radiating into her right flank that is described as a sharp grinding/gnawing sensation.  Along this timeframe, also endorses worsening dyspepsia with significant nausea and vomiting as well.  Following onset, has had onset of coffee-ground emesis/hematemesis.  No shortness of breath, chest pain, or fever/chills endorsed.  Patient does endorse dysphagia to solids and liquids feeling food intermittently gets stuck requiring regurgitation.  Has been on high-dose PPI therapy given her personal history of Salgado's esophagus.  Has been using Tums for breakthrough dyspepsia.  No use of NSAIDs secondary to renal function.  Is not anticoagulated.  Has had prior scopes in the past and notes that she is scheduled for a bidirectional endoscopy next month per Dr. Orosco; endorses family history of colon cancer. Past medical, surgical, social, and family histories are reviewed for accuracy.  No documented alleviating or exacerbating factors.  Does not endorse pain at time of exam.    Allergies:  Lamictal [lamotrigine], Amlodipine, Rocephin [ceftriaxone], and Tetracyclines & related    Scheduled Meds:  NIFEdipine XL, 30 mg, Oral,  Daily  pantoprazole, 40 mg, Intravenous, Q12H  sodium chloride, 10 mL, Intravenous, Q12H  [Held by provider] triamterene-hydrochlorothiazide, 1 tablet, Oral, Daily  [Held by provider] valsartan, 160 mg, Oral, Daily  ziprasidone, 40 mg, Oral, Nightly         Infusions:       PRN Meds:    acetaminophen **OR** acetaminophen **OR** acetaminophen    albuterol sulfate HFA    senna-docusate sodium **AND** polyethylene glycol **AND** bisacodyl **AND** bisacodyl    diphenhydrAMINE    diphenhydrAMINE-zinc acetate    hydrALAZINE    HYDROmorphone    Morphine **AND** naloxone    ondansetron ODT **OR** ondansetron    prochlorperazine    promethazine    sodium chloride    sodium chloride    sodium chloride    Home Meds:  Medications Prior to Admission   Medication Sig Dispense Refill Last Dose/Taking    buPROPion XL (WELLBUTRIN XL) 150 MG 24 hr tablet Take 1 tablet by mouth Daily.   11/17/2024    buPROPion XL (WELLBUTRIN XL) 300 MG 24 hr tablet Take 1 tablet by mouth Daily.   11/17/2024    FLUoxetine (PROzac) 40 MG capsule Take 1 capsule by mouth Daily.   11/17/2024    furosemide (LASIX) 20 MG tablet Take 1 tablet by mouth Daily.   11/17/2024    hydrALAZINE (APRESOLINE) 50 MG tablet Take 1 tablet by mouth Every 8 (Eight) Hours As Needed (BP >180/100 mm Hg). 30 tablet 5 11/17/2024    nebivolol (Bystolic) 20 MG tablet Take 1 tablet by mouth Daily. 30 tablet 5 Past Week    NIFEdipine CC (ADALAT CC) 30 MG 24 hr tablet Take 1 tablet by mouth Daily.   11/17/2024    pantoprazole (PROTONIX) 40 MG EC tablet Take 1 tablet by mouth 2 (Two) Times a Day. 30 minutes prior to first meal and 30 minutes prior to last meal of the day 180 tablet 3 11/17/2024    promethazine (PHENERGAN) 25 MG tablet Take 1 tablet by mouth Every 6 (Six) Hours As Needed for Nausea or Vomiting. 10 tablet 0 11/17/2024    rOPINIRole (REQUIP) 1 MG tablet Take 1 tablet by mouth Every Night.   11/16/2024    topiramate (TOPAMAX) 200 MG tablet    11/17/2024    traZODone  (DESYREL) 100 MG tablet Take 1 tablet by mouth As Needed.   Past Month    traZODone (DESYREL) 150 MG tablet Take 1 tablet by mouth As Needed.   Past Month    triamterene-hydrochlorothiazide (Dyazide) 37.5-25 MG per capsule Take 1 capsule by mouth Every Morning. 30 capsule 3 11/17/2024    valsartan (DIOVAN) 160 MG tablet Take 1 tablet by mouth Daily. 30 tablet 5 11/16/2024    ziprasidone (Geodon) 40 MG capsule Take 1 capsule by mouth 2 (Two) Times a Day With Meals.   11/16/2024    EPINEPHrine (EPIPEN) 0.3 MG/0.3ML solution auto-injector injection INJECT 1 PEN IN THE MUSCLE ONE TIME AS DIRECTED IF STUNG BY WASP THEN GO TO EMERGENCY ROOM       Fluticasone-Salmeterol (ADVAIR/WIXELA) 100-50 MCG/ACT DISKUS Inhale 1 puff 2 (Two) Times a Day.   More than a month    hydrOXYzine (ATARAX) 25 MG tablet    More than a month    naloxone (NARCAN) 4 MG/0.1ML nasal spray        PROAIR  (90 Base) MCG/ACT inhaler Inhale 2 puffs Every 6 (Six) Hours As Needed.       valACYclovir (VALTREX) 1000 MG tablet Take 1 tablet by mouth Daily.   More than a month       ROS: Review of Systems   Constitutional:  Positive for activity change, appetite change and fatigue. Negative for chills, diaphoresis, fever and unexpected weight change.   HENT:  Positive for trouble swallowing. Negative for sore throat and voice change.    Eyes: Negative.    Respiratory:  Negative for apnea, cough, choking, chest tightness, shortness of breath, wheezing and stridor.    Cardiovascular:  Negative for chest pain, palpitations and leg swelling.   Gastrointestinal:  Positive for abdominal pain, nausea and vomiting. Negative for abdominal distention, anal bleeding, blood in stool, constipation, diarrhea and rectal pain.   Endocrine: Negative.    Genitourinary: Negative.    Musculoskeletal: Negative.    Skin:  Positive for pallor.   Allergic/Immunologic: Negative.    Neurological: Negative.    Hematological: Negative.    Psychiatric/Behavioral: Negative.     All  other systems reviewed and are negative.      PAST MED HX:  Past Medical History:   Diagnosis Date    ADHD (attention deficit hyperactivity disorder)     Asthma     Salgado esophagus 2024    Bipolar 1 disorder     Bipolar 1 disorder 2024    Hemorrhage     Herpes     History of alcohol abuse 2024    Sober since 2020      Hypertension     Migraine     Resistant hypertension 2024    Urinary tract infection     Weight gain, abnormal 2024    Reports 30 lbs increase unintentional over 4 months         PAST SURG HX:  Past Surgical History:   Procedure Laterality Date    CHOLECYSTECTOMY      COLONOSCOPY      unknown    HYSTERECTOMY      OOPHORECTOMY  2015    ROTATOR CUFF REPAIR      ULNAR NERVE REPAIR      UPPER GASTROINTESTINAL ENDOSCOPY  2019    Dr. Cruz HUGHES HX:  Family History   Problem Relation Age of Onset    Arthritis Mother     Hypertension Mother     Thyroid disease Mother     Thyroid cancer Mother     Heart disease Father     Hypertension Father     Hyperlipidemia Father     Arrhythmia Father     Heart attack Father     Hypertension Brother     No Known Problems Daughter     No Known Problems Daughter     No Known Problems Daughter     Heart disease Paternal Uncle     Colon cancer Maternal Grandmother     Heart disease Maternal Grandmother     Breast cancer Paternal Grandmother     Diabetes Paternal Grandmother     Colon cancer Paternal Grandmother     Early death Paternal Grandfather        SOC HX:  Social History     Socioeconomic History    Marital status:    Tobacco Use    Smoking status: Former     Current packs/day: 0.00     Types: Cigarettes     Start date: 1998     Quit date: 2019     Years since quittin.9     Passive exposure: Past    Smokeless tobacco: Never   Vaping Use    Vaping status: Never Used   Substance and Sexual Activity    Alcohol use: Not Currently     Comment: sober since 2020    Drug use: Not Currently     Comment: 2  "years sober    Sexual activity: Not Currently     Partners: Male     Birth control/protection: Hysterectomy, Surgical       PHYSICAL EXAM  /95 (BP Location: Right arm, Patient Position: Lying)   Pulse 77   Temp 97.6 °F (36.4 °C) (Temporal)   Resp 18   Ht 177.8 cm (70\")   Wt 95.5 kg (210 lb 9.6 oz)   LMP  (LMP Unknown) Comment: NO PAP SINCE HYSTERECTOMY  SpO2 96%   BMI 30.22 kg/m²   Wt Readings from Last 3 Encounters:   11/17/24 95.5 kg (210 lb 9.6 oz)   11/12/24 97.9 kg (215 lb 14.4 oz)   11/05/24 94.8 kg (209 lb)   ,body mass index is 30.22 kg/m².  Physical Exam  Vitals and nursing note reviewed.   Constitutional:       General: She is not in acute distress.     Appearance: Normal appearance. She is normal weight. She is not ill-appearing or toxic-appearing.   HENT:      Head: Normocephalic and atraumatic.   Eyes:      Extraocular Movements: Extraocular movements intact.      Conjunctiva/sclera: Conjunctivae normal.      Pupils: Pupils are equal, round, and reactive to light.   Cardiovascular:      Rate and Rhythm: Normal rate and regular rhythm.      Pulses: Normal pulses.      Heart sounds: Normal heart sounds.   Pulmonary:      Effort: Pulmonary effort is normal. No respiratory distress.      Breath sounds: Normal breath sounds.   Abdominal:      General: Abdomen is flat. Bowel sounds are normal. There is no distension.      Palpations: Abdomen is soft. There is no mass.      Tenderness: There is abdominal tenderness. There is no guarding or rebound.      Hernia: No hernia is present.   Genitourinary:     Rectum: Guaiac result positive.   Musculoskeletal:         General: Normal range of motion.      Right lower leg: No edema.      Left lower leg: No edema.   Skin:     General: Skin is warm and dry.      Capillary Refill: Capillary refill takes less than 2 seconds.      Coloration: Skin is not jaundiced or pale.   Neurological:      General: No focal deficit present.      Mental Status: She is " "alert and oriented to person, place, and time.   Psychiatric:         Mood and Affect: Mood normal.         Behavior: Behavior normal.         Thought Content: Thought content normal.         Judgment: Judgment normal.         Results Review:   I reviewed the patient's new clinical results.  I reviewed the patient's new imaging results and agree with the interpretation.  I reviewed the patient's other test results and agree with the interpretation  I personally viewed and interpreted the patient's EKG/Telemetry data    Lab Results   Component Value Date    WBC 5.19 11/18/2024    HGB 13.3 11/18/2024    HGB 14.8 11/17/2024    HGB 14.6 11/05/2024    HCT 40.5 11/18/2024    MCV 91.4 11/18/2024     11/18/2024       No results found for: \"INR\"    Lab Results   Component Value Date    GLUCOSE 83 11/18/2024    BUN 20 11/18/2024    CREATININE 0.98 11/18/2024    EGFRIFNONA 56 (L) 07/06/2021    BCR 20.4 11/18/2024     11/18/2024    K 3.4 (L) 11/18/2024    CO2 25.0 11/18/2024    CALCIUM 8.9 11/18/2024    ALBUMIN 4.7 11/17/2024    ALKPHOS 119 (H) 11/17/2024    BILITOT 0.5 11/17/2024    ALT 24 11/17/2024    AST 22 11/17/2024       CT Chest With Contrast Diagnostic    Result Date: 11/17/2024  CT ABDOMEN PELVIS W CONTRAST, CT CHEST W CONTRAST DIAGNOSTIC Date of Exam: 11/17/2024 3:08 PM EST Indication: vomiting blood tinged bile. Comparison: 11/15/2009. Technique: Axial CT images were obtained of the chest, abdomen and pelvis following the uneventful intravenous administration of 80 mL Isovue-300 8. Reconstructed coronal and sagittal images were also obtained. Automated exposure control and iterative construction methods were used. Findings: CT chest: There is no pathologic axillary adenopathy or other worrisome body wall soft tissue finding in the chest. There is no pleural or pericardial effusion. Nonaneurysmal thoracic aorta. There is no pathologic mediastinal or hilar adenopathy. The lung fields demonstrate no " evidence of acute infectious process or distinct suspicious focal pulmonary nodularity. Central airways are clear. The osseous structures demonstrate no evidence of acute fracture or aggressive osseous lesion. CT abdomen pelvis: The liver, spleen, pancreas and bilateral adrenal glands demonstrate no acute or suspicious findings. The kidneys demonstrate symmetric nephrograms and contrast excretion without evidence of stones, hydronephrosis or nephropathy. Prior  cholecystectomy. Small and large bowel loops are nondilated. The appendix is normal. There is no free fluid or pneumoperitoneum. Nonaneurysmal abdominal aorta. There is no worrisome retroperitoneal lymphadenopathy. The pelvic viscera demonstrate no acute or suspicious findings. The osseous structures demonstrate no evidence of acute fracture or aggressive osseous lesion.     Impression: Essentially normal contrast-enhanced CT of the chest, abdomen and pelvis. No acute findings are present. Electronically Signed: Leobardo Browning MD  11/17/2024 3:52 PM EST  Workstation ID: [id number]    CT Abdomen Pelvis With Contrast    Result Date: 11/17/2024  CT ABDOMEN PELVIS W CONTRAST, CT CHEST W CONTRAST DIAGNOSTIC Date of Exam: 11/17/2024 3:08 PM EST Indication: vomiting blood tinged bile. Comparison: 11/15/2009. Technique: Axial CT images were obtained of the chest, abdomen and pelvis following the uneventful intravenous administration of 80 mL Isovue-300 8. Reconstructed coronal and sagittal images were also obtained. Automated exposure control and iterative construction methods were used. Findings: CT chest: There is no pathologic axillary adenopathy or other worrisome body wall soft tissue finding in the chest. There is no pleural or pericardial effusion. Nonaneurysmal thoracic aorta. There is no pathologic mediastinal or hilar adenopathy. The lung fields demonstrate no evidence of acute infectious process or distinct suspicious focal pulmonary nodularity. Central  airways are clear. The osseous structures demonstrate no evidence of acute fracture or aggressive osseous lesion. CT abdomen pelvis: The liver, spleen, pancreas and bilateral adrenal glands demonstrate no acute or suspicious findings. The kidneys demonstrate symmetric nephrograms and contrast excretion without evidence of stones, hydronephrosis or nephropathy. Prior  cholecystectomy. Small and large bowel loops are nondilated. The appendix is normal. There is no free fluid or pneumoperitoneum. Nonaneurysmal abdominal aorta. There is no worrisome retroperitoneal lymphadenopathy. The pelvic viscera demonstrate no acute or suspicious findings. The osseous structures demonstrate no evidence of acute fracture or aggressive osseous lesion.     Impression: Essentially normal contrast-enhanced CT of the chest, abdomen and pelvis. No acute findings are present. Electronically Signed: Leobardo Browning MD  11/17/2024 3:52 PM EST  Workstation ID: [id number]    XR Chest 1 View    Result Date: 11/17/2024  XR CHEST 1 VW Date of Exam: 11/17/2024 1:54 PM EST Indication: SOA triage protocol Comparison: August 28, 2024 Findings: Heart not enlarged. The lungs seem clear. There are no pleural effusions. The visualized osseous structures do not appear unusual.     Impression: 1.An acute pulmonary process is not apparent. Electronically Signed: Chaim Hoang MD  11/17/2024 2:21 PM EST  Workstation ID: [id number]    MRI Brain Without Contrast    Result Date: 11/5/2024  MRI BRAIN WO CONTRAST Date of Exam: 11/5/2024 8:58 PM EST Indication: Dizziness, HA, fine motor disturbances, elevated BP, r/o PRES.  Comparison: None available. Technique:  Routine multiplanar/multisequence sequence images of the brain were obtained without contrast administration. FINDINGS: No abnormal diffusion restriction is observed to indicate acute or subacute focal ischemia. Nonspecific white matter signal changes are noted indicating chronic small vessel ischemic changes  or age-related changes. There is no evidence for abnormal cerebral edema. There is no mass effect or midline shift. The ventricular system is nondilated. The basilar cisterns are patent. The midline structures are unremarkable. No significant extra-axial fluid collections are identified. No significant abnormal  signal is observed involving the paranasal sinuses or mastoid air cells.     1.No evidence for acute or subacute focal ischemia. 2.No evidence for abnormal focal enhancement or abnormal enhancing mass. 3.Mild nonspecific white matter signal changes are noted suggesting early age-related changes. Similar changes can also be seen in the setting of migraine headaches. Electronically Signed: Ru Claudio MD  11/5/2024 9:32 PM EST  Workstation ID: [id number]    XR Chest 1 View    Result Date: 10/20/2024  PORTABLE CHEST  10/20/2024 9:09 AM HISTORY: Bilateral leg swelling COMPARISON:   None FINDINGS: The cardiac silhouette is normal in size. The mediastinal and hilar contours are unremarkable.  The lungs are clear. There is no pneumothorax. The visualized osseous structures demonstrate no acute abnormalities.    No acute cardiopulmonary process. Images reviewed, interpreted, and dictated by Dr. Charlie Urbina. Transcribed by Shelby GOMEZ (R), KURTIS (R).     ASSESSMENTS/PLANS  1.  Acute epigastric abdominal pain, right upper quadrant abdominal pain  2.  Hematemesis, coffee-ground emesis  3.  Salgado's esophagus  4.  Dysphagia to solids and liquids  5.  GERD with esophagitis.    Bebe Rincon is a 44 y.o. female who presents to hospital with worsening epigastric and upper quadrant abdominal pain with associated nausea and vomiting and bloody emesis.  Patient with significant alarm features including dysphagia and bleeding warranting urgent EGD.  Recommend EGD today for further evaluation.  Patient has been on high intensity PPI therapy following her diagnosis of Salgado's esophagus; will need to  transition from pantoprazole to new agent, likely Dexilant.    >>> N.p.o.  >>> EGD today  >>> IV PPI twice daily  >>> Monitor H&H and transfuse per protocol  >>> As needed antiemetics  >>> Patient will need to keep outpatient appointment with Dr. Orosco for repeat endoscopy/bidirectional endoscopy next month; discussed with her that she will need Salgado's biopsies for surveillance and that those will need completed while not experiencing a gastrointestinal bleed per her primary GI.    I discussed the patient's findings and my recommendations with patient, nursing staff, and consulting provider.    Delmer Garsia, APRN  11/18/24  09:21 EST

## 2024-11-18 NOTE — ANESTHESIA POSTPROCEDURE EVALUATION
Patient: Bebe Rincon    Procedure Summary       Date: 11/18/24 Room / Location:  [facility] /  [facility]    Anesthesia Start: 0936 Anesthesia Stop: 0955    Procedure: ESOPHAGOGASTRODUODENOSCOPY Diagnosis:       Epigastric abdominal pain      Hematemesis with nausea      Pharyngoesophageal dysphagia      (Epigastric abdominal pain [R10.13])      (Hematemesis with nausea [K92.0])      (Pharyngoesophageal dysphagia [R13.14])    Surgeons: Shadi Frost MD Provider: Mane Shafer MD    Anesthesia Type: general, MAC ASA Status: 3            Anesthesia Type: general, MAC    Vitals  Vitals Value Taken Time   /84 11/18/24 0955   Temp 97 °F (36.1 °C) 11/18/24 0955   Pulse 80 11/18/24 0955   Resp 14 11/18/24 0955   SpO2 100 % 11/18/24 0955           Post Anesthesia Care and Evaluation    Patient location during evaluation: PACU  Patient participation: complete - patient participated  Level of consciousness: awake and alert  Pain management: adequate    Airway patency: patent  Anesthetic complications: No anesthetic complications  PONV Status: none  Cardiovascular status: hemodynamically stable and acceptable  Respiratory status: nonlabored ventilation, acceptable and nasal cannula  Hydration status: acceptable  No anesthesia care post op

## 2024-11-18 NOTE — CONSULTS
Bebe Rincon  [id number]  1980   LOS: 0 days   Patient Care Team:  Tracie Meraz APRN as PCP - General (Nurse Practitioner)  Shadi Lester III, MD as Cardiologist (Cardiology)    ID: 44-year-old  white female disabled / (2020) from [place], [place] admitted from [facility] ED.    Chief Complaint:  CHEST PAIN    Problem List:    Severe refractory hypertension of uncertain cause following gestational hypertension, 1993  Mild (type I) obesity (BMI 30.2)  ADHD  Bipolar 1 disorder  Asthma  H/O GERD with Salgado's esophagitis and remote EGD-data deficit  Hypercalcemia with recent excessive Tums use, November 2024, resolved  Remote alcohol abuse (sober since November 2020)  Intermittent headache syndrome-possible migraines with recent acceptable brain MRI, November 2024  Remote operations:  A.  Cholecystectomy-data deficit  B.  Colonoscopy-data deficit  C.  Hysterectomy/BSO-data deficit  D.  Rotator cuff repair-data deficit  E.  Ulnar nerve repair-data deficit  F.  EGD, January 2019  11.  Probable hypertensive cardiovascular disease:  A.  Acceptable Cardiolite GXT with moderate exercise impairment and preserved LVEF (0.54) without epicardial coronary calcification, December 2020  B.  Acceptable echocardiogram with mild concentric LVH in the absence of valvular dysfunction, December 2020  C.  Progressive chest pain syndrome with abnormal acceptable Cardiolite and normal HS troponin, November 2024  12.  Remote tobacco use, discontinued  13.  PAULETTE, resolved  14.  Dyslipidemia treated with diet.    Allergies   Allergen Reactions    Lamictal [Lamotrigine] Anaphylaxis    Amlodipine Irritability    Rocephin [Ceftriaxone] Hives    Tetracyclines & Related Other (See Comments)     Low h&h     Medications Prior to Admission   Medication Sig Dispense Refill Last Dose/Taking    buPROPion XL (WELLBUTRIN XL) 150 MG 24 hr tablet Take 1 tablet by mouth Daily.   11/17/2024    buPROPion XL  (WELLBUTRIN XL) 300 MG 24 hr tablet Take 1 tablet by mouth Daily.   11/17/2024    FLUoxetine (PROzac) 40 MG capsule Take 1 capsule by mouth Daily.   11/17/2024    furosemide (LASIX) 20 MG tablet Take 1 tablet by mouth Daily.   11/17/2024    hydrALAZINE (APRESOLINE) 50 MG tablet Take 1 tablet by mouth Every 8 (Eight) Hours As Needed (BP >180/100 mm Hg). 30 tablet 5 11/17/2024    nebivolol (Bystolic) 20 MG tablet Take 1 tablet by mouth Daily. 30 tablet 5 Past Week    NIFEdipine CC (ADALAT CC) 30 MG 24 hr tablet Take 1 tablet by mouth Daily.   11/17/2024    pantoprazole (PROTONIX) 40 MG EC tablet Take 1 tablet by mouth 2 (Two) Times a Day. 30 minutes prior to first meal and 30 minutes prior to last meal of the day 180 tablet 3 11/17/2024    promethazine (PHENERGAN) 25 MG tablet Take 1 tablet by mouth Every 6 (Six) Hours As Needed for Nausea or Vomiting. 10 tablet 0 11/17/2024    rOPINIRole (REQUIP) 1 MG tablet Take 1 tablet by mouth Every Night.   11/16/2024    topiramate (TOPAMAX) 200 MG tablet    11/17/2024    traZODone (DESYREL) 100 MG tablet Take 1 tablet by mouth As Needed.   Past Month    traZODone (DESYREL) 150 MG tablet Take 1 tablet by mouth As Needed.   Past Month    triamterene-hydrochlorothiazide (Dyazide) 37.5-25 MG per capsule Take 1 capsule by mouth Every Morning. 30 capsule 3 11/17/2024    valsartan (DIOVAN) 160 MG tablet Take 1 tablet by mouth Daily. 30 tablet 5 11/16/2024    ziprasidone (Geodon) 40 MG capsule Take 1 capsule by mouth 2 (Two) Times a Day With Meals.   11/16/2024    EPINEPHrine (EPIPEN) 0.3 MG/0.3ML solution auto-injector injection INJECT 1 PEN IN THE MUSCLE ONE TIME AS DIRECTED IF STUNG BY WASP THEN GO TO EMERGENCY ROOM       Fluticasone-Salmeterol (ADVAIR/WIXELA) 100-50 MCG/ACT DISKUS Inhale 1 puff 2 (Two) Times a Day.   More than a month    hydrOXYzine (ATARAX) 25 MG tablet    More than a month    naloxone (NARCAN) 4 MG/0.1ML nasal spray        PROAIR  (90 Base) MCG/ACT inhaler  "Inhale 2 puffs Every 6 (Six) Hours As Needed.       valACYclovir (VALTREX) 1000 MG tablet Take 1 tablet by mouth Daily.   More than a month     Scheduled Meds:NIFEdipine XL, 30 mg, Oral, Daily  pantoprazole, 40 mg, Intravenous, Q12H  sodium chloride, 10 mL, Intravenous, Q12H  [Held by provider] triamterene-hydrochlorothiazide, 1 tablet, Oral, Daily  [Held by provider] valsartan, 160 mg, Oral, Daily  ziprasidone, 40 mg, Oral, Nightly      Continuous Infusions:   PRN Meds:.  acetaminophen **OR** acetaminophen **OR** acetaminophen    albuterol sulfate HFA    senna-docusate sodium **AND** polyethylene glycol **AND** bisacodyl **AND** bisacodyl    diphenhydrAMINE    diphenhydrAMINE-zinc acetate    hydrALAZINE    HYDROmorphone    Morphine **AND** naloxone    ondansetron ODT **OR** ondansetron    prochlorperazine    promethazine    sodium chloride    sodium chloride    sodium chloride       History of Present Illness:      This younger female recently initiated cardiology care with [facility] (Shadi Lester III, MD) on 12 November 2024 for the above-noted medical problems with medication adjustments at that time with subsequent admission to hospital yesterday afternoon from ED with intractable nausea, emesis, abdominal pain, chest pain, and uncontrolled hypertension.  Patient developed hypertension during her first pregnancy approximately 19 years ago.  She has had intermittent difficult to control breakthrough hypertensive readings including over the past several months and last week underwent outpatient cardiology evaluation with Dr. Shadi Lester with medication changes recommended.  In view of intractable complaints of nausea, emesis, weakness, sharp hurting pains\" in the chest as well as abdominal pain and headache she presented to [facility] ED yesterday was admitted in view of uncontrolled hypertension and multiple complaints as well as electrolyte abnormalities.  Electrolyte abnormalities including PAULETTE have largely " resolved and she is scheduled to undergo EGD this morning with Dr. Brunner and has had less notable chest pain although still present.  She does relate exertional dyspnea but prior to the past several weeks had been able to walk several blocks with her dog and attempt to pursue regular exercise and activity program.  She additionally has been more strict with no added salt diet.  She denies orthopnea, PND, tacky palpitation, TIA, or claudication but has had significant pedal edema that is somewhat improved this morning.  No fever, chills, rash, or adenopathy.  No history of thoracoabdominal trauma or falls.  No prior history of myocardial infarction, atrial fibrillation, obstructive sleep apnea, or marked obesity.  She denies current  complaints.    Cardiac risk factors: dyslipidemia, family history of premature cardiovascular disease, hypertension, obesity (BMI >= 30 kg/m2), sedentary lifestyle, and smoking/ tobacco exposure.    Social History     Socioeconomic History    Marital status:    Tobacco Use    Smoking status: Former     Current packs/day: 0.00     Types: Cigarettes     Start date: 1998     Quit date: 2019     Years since quittin.9     Passive exposure: Past    Smokeless tobacco: Never   Vaping Use    Vaping status: Never Used   Substance and Sexual Activity    Alcohol use: Not Currently     Comment: sober since 2020    Drug use: Not Currently     Comment: 2 years sober    Sexual activity: Not Currently     Partners: Male     Birth control/protection: Hysterectomy, Surgical     Family History   Problem Relation Age of Onset    Arthritis Mother     Hypertension Mother     Thyroid disease Mother     Thyroid cancer Mother     Heart disease Father     Hypertension Father     Hyperlipidemia Father     Arrhythmia Father     Heart attack Father     Hypertension Brother     No Known Problems Daughter     No Known Problems Daughter     No Known Problems Daughter     Heart disease  "Paternal Uncle     Colon cancer Maternal Grandmother     Heart disease Maternal Grandmother     Breast cancer Paternal Grandmother     Diabetes Paternal Grandmother     Colon cancer Paternal Grandmother     Early death Paternal Grandfather        Review of Systems  10 point review of systems was completed, positives outlined in the HPI, and otherwise all other systems are negative.      Objective:       Physical Exam  /89 (BP Location: Right arm, Patient Position: Lying)   Pulse 77   Temp 98 °F (36.7 °C) (Oral)   Resp 16   Ht 177.8 cm (70\")   Wt 95.5 kg (210 lb 9.6 oz)   LMP  (LMP Unknown) Comment: NO PAP SINCE HYSTERECTOMY  SpO2 99%   BMI 30.22 kg/m²       11/17/24  1346 11/17/24 2018   Weight: 97.5 kg (215 lb) 95.5 kg (210 lb 9.6 oz)     Body mass index is 30.22 kg/m².  No intake or output data in the 24 hours ending 11/18/24 0829    General Appearance:  Alert, cooperative, no distress, appears stated age   Head:  Normocephalic, without obvious abnormality, atraumatic   Eyes:  PERRL, conjunctivae/corneas clear, EOM's intact   Throat: Lips, mucosa, and tongue normal; remaining teeth in good repair with clear pharynx   Neck: Supple, symmetrical, trachea midline, no adenopathy, thyroid: not enlarged, symmetric, no tenderness/mass/nodules, no carotid bruit or JVD   Lungs:   Clear to auscultation bilaterally, respirations unlabored   Heart:  Regular rate and rhythm, S1, S2 normal, grade 1/6 systolic murmur, no rub or gallop   Abdomen:   Soft, nontender, no masses, no organomegaly, bowel sounds audible x4   Extremities: No edema, normal range of motion   Pulses: 2+ and symmetric; no radial-femoral pulse delay   Skin: Skin color, texture, turgor normal, no rashes or lesions   Neurologic: Normal; no focal changes noted and gait not tested     Cardiographics:    EKG:    Sinus tachycardia with frequent premature ventricular complexes  Possible Left atrial enlargement  Abnormal ECG  When compared with ECG of " 28-Aug-2024 03:21,  premature ventricular complexes are now present  Confirmed by NICOLA YUN MD ([id number]) on 11/17/2024 7:53:22 PM    Imaging:    Chest x-ray:    Findings:    Heart not enlarged. The lungs seem clear. There are no pleural effusions. The visualized osseous structures do not appear unusual.     IMPRESSION: An acute pulmonary process is not apparent.    ABDOMINOPELVIC CT SCAN:    Findings:    CT chest: There is no pathologic axillary adenopathy or other worrisome body wall soft tissue finding in the chest. There is no pleural or pericardial effusion. Nonaneurysmal thoracic aorta. There is no pathologic mediastinal or hilar adenopathy. The lung fields demonstrate no evidence of acute infectious process or distinct suspicious focal pulmonary nodularity. Central airways are clear. The osseous structures demonstrate no evidence of acute fracture or aggressive osseous lesion.     CT abdomen pelvis: The liver, spleen, pancreas and bilateral adrenal glands demonstrate no acute or suspicious findings. The kidneys demonstrate symmetric nephrograms and contrast excretion without evidence of stones, hydronephrosis or nephropathy. Prior cholecystectomy. Small and large bowel loops are nondilated. The appendix is normal. There is no free fluid or pneumoperitoneum. Nonaneurysmal abdominal aorta. There is no worrisome retroperitoneal lymphadenopathy. The pelvic viscera demonstrate no   acute or suspicious findings. The osseous structures demonstrate no evidence of acute fracture or aggressive osseous lesion.     IMPRESSION: Essentially normal contrast-enhanced CT of the chest, abdomen and pelvis. No acute findings are present.    CHEST CT SCAN: SEE ABOVE    BRAIN MRI SCAN WO CONTRAST (11/5/2024):    FINDINGS:    No abnormal diffusion restriction is observed to indicate acute or subacute focal ischemia. Nonspecific white matter signal changes are noted indicating chronic small vessel ischemic changes or age-related  changes. There is no evidence for abnormal cerebral edema. There is no mass effect or midline shift. The ventricular system is nondilated. The basilar cisterns are patent. The midline structures are unremarkable. No significant extra-axial fluid collections are identified. No significant abnormal signal is observed involving the paranasal sinuses or mastoid air cells.     IMPRESSIONS:    1.No evidence for acute or subacute focal ischemia.  2.No evidence for abnormal focal enhancement or abnormal enhancing mass.  3.Mild nonspecific white matter signal changes are noted suggesting early age-related changes. Similar changes can also be seen in the setting of migraine headaches.    Lab Review   Results from last 7 days   Lab Units 11/18/24  0502 11/17/24  1421   SODIUM mmol/L 136 136   POTASSIUM mmol/L 3.4* 3.8   CHLORIDE mmol/L 99 95*   CO2 mmol/L 25.0 25.0   BUN mg/dL 20 24*   CREATININE mg/dL 0.98 1.37*   GLUCOSE mg/dL 83 98   CALCIUM mg/dL 8.9 10.8*     Results from last 7 days   Lab Units 11/18/24  0502 11/17/24  1421   WBC 10*3/mm3 5.19 9.67   HEMOGLOBIN g/dL 13.3 14.8   HEMATOCRIT % 40.5 43.7   PLATELETS 10*3/mm3 298 365     Results from last 7 days   Lab Units 11/18/24  0502   HEMOGLOBIN A1C % 5.10     Results from last 7 days   Lab Units 11/17/24  1421   HSTROP T ng/L 9     URINALYSIS: Cloudy with small leukocytes and trace protein with 4+ bacteriuria in the absence of leukocytes  NO ABGs / MAGNESIUM / FLP / CRP / PROCALCITONIN / ESR / RESPIRATORY PANEL PCR  ALBUMIN: 4.7  LFTs: WNL  TSH: WNL  LIPASE: 21  D-DIMER: 0.38  proBNP: 311.7  NO DRIPS.     Assessment:      Younger female with progressive difficult to control hypertension the setting of weight gain and somewhat limited activity although improved with walking program recently.  Suspect essential hypertension.  Doubt aortic coarctation, pheochromocytoma, or Cushing's disease.  Cannot exclude remote consideration of fibromuscular dysplasia of the renal  arteries as well as adrenal excess.  Interestingly, blood pressure currently much improved since admission on bed rest.      Plan:     1.  Defer MPS/LHC/echocardiogram  2.  Renal artery duplex study  3.  Await results of serum catecholamines as well as repeat PRA/aldosterone level which remotely was normal in 2019  4.  Await results of EGD in terms of whether the patient would be a candidate for a trial of vonoprazan  5.  Would consider altering current antihypertensive therapy to the following regimen:  Nifedipine XL 30 mg daily  Valsartan HCTZ 160/12.5 daily  Nebivolol 5 mg daily  6.  Would monitor blood pressure and titrate upward on above medications by doubling doses over the next 1-2 weeks as an outpatient and defer adding selective aldosterone receptor antagonist therapy at this time such as eplerenone    Discussed with patient and mother in room.

## 2024-11-18 NOTE — PLAN OF CARE
Goal Outcome Evaluation:  Plan of Care Reviewed With: patient        Progress: no change  Outcome Evaluation: No acute changes during shift. Provider contacted x3 d/t uncontrolled pain, as well as itching, and nausea, which they have had since presenting to [facility]. Pt resting in bed with eyes closed, call light within reach. Bed in lowest position and locked. No pain reported at this moment. Will continue to monitor.         Problem: Adult Inpatient Plan of Care  Goal: Plan of Care Review  Outcome: Progressing  Flowsheets (Taken 11/18/2024 0616)  Progress: no change  Outcome Evaluation: No acute changes during shift. Provider contacted x3 d/t uncontrolled pain, as well as itching, and nausea, which they have had since presenting to [facility]. Pt resting in bed with eyes closed, call light within reach. Bed in lowest position and locked. No pain reported at this moment. Will continue to monitor.  Plan of Care Reviewed With: patient  Goal: Patient-Specific Goal (Individualized)  Outcome: Progressing  Goal: Absence of Hospital-Acquired Illness or Injury  Outcome: Progressing  Intervention: Identify and Manage Fall Risk  Recent Flowsheet Documentation  Taken 11/18/2024 0000 by Verna Mims, RN  Safety Promotion/Fall Prevention:   safety round/check completed   nonskid shoes/slippers when out of bed  Taken 11/17/2024 2200 by Verna Mims RN  Safety Promotion/Fall Prevention:   safety round/check completed   nonskid shoes/slippers when out of bed  Taken 11/17/2024 2000 by Verna Mims, RN  Safety Promotion/Fall Prevention:   nonskid shoes/slippers when out of bed   safety round/check completed   clutter free environment maintained   fall prevention program maintained   lighting adjusted   room organization consistent  Intervention: Prevent Skin Injury  Recent Flowsheet Documentation  Taken 11/18/2024 0000 by Verna Mims, RN  Body Position: position changed independently  Skin Protection:   transparent dressing  maintained   silicone foam dressing in place   skin sealant/moisture barrier applied  Taken 11/17/2024 2200 by Verna Mims RN  Body Position: position changed independently  Taken 11/17/2024 2000 by Verna Mims RN  Body Position: position changed independently  Skin Protection:   transparent dressing maintained   skin sealant/moisture barrier applied  Intervention: Prevent and Manage VTE (Venous Thromboembolism) Risk  Recent Flowsheet Documentation  Taken 11/17/2024 2000 by Verna Mims RN  VTE Prevention/Management: (pt ambulating)   patient refused intervention   other (see comments)  Intervention: Prevent Infection  Recent Flowsheet Documentation  Taken 11/18/2024 0000 by Verna Mims RN  Infection Prevention:   cohorting utilized   environmental surveillance performed   equipment surfaces disinfected   hand hygiene promoted   personal protective equipment utilized   rest/sleep promoted   single patient room provided   visitors restricted/screened  Taken 11/17/2024 2200 by Verna Mims RN  Infection Prevention:   cohorting utilized   environmental surveillance performed   equipment surfaces disinfected   hand hygiene promoted   personal protective equipment utilized   rest/sleep promoted   single patient room provided   visitors restricted/screened  Taken 11/17/2024 2000 by Verna Mims RN  Infection Prevention:   cohorting utilized   environmental surveillance performed   equipment surfaces disinfected   hand hygiene promoted   personal protective equipment utilized   rest/sleep promoted   single patient room provided   visitors restricted/screened  Goal: Optimal Comfort and Wellbeing  Outcome: Progressing  Intervention: Provide Person-Centered Care  Recent Flowsheet Documentation  Taken 11/17/2024 2000 by Verna Mims RN  Trust Relationship/Rapport:   care explained   emotional support provided   choices provided   empathic listening provided   questions answered   questions encouraged    reassurance provided   thoughts/feelings acknowledged  Goal: Readiness for Transition of Care  Outcome: Progressing     Problem: Nausea and Vomiting  Goal: Nausea and Vomiting Relief  Outcome: Progressing  Intervention: Prevent and Manage Nausea and Vomiting  Recent Flowsheet Documentation  Taken 11/17/2024 2000 by Verna Mims RN  Fluid/Electrolyte Management: fluids provided  Nausea/Vomiting Interventions:   slow deep breathing encouraged   medication given  Environmental Support:   calm environment promoted   environmental consistency promoted   rest periods encouraged   rooming-in facilitated  Oral Care:   mouth wash rinse   oral rinse provided     Problem: Comorbidity Management  Goal: Blood Pressure in Desired Range  Outcome: Progressing  Intervention: Maintain Blood Pressure Management  Recent Flowsheet Documentation  Taken 11/17/2024 2000 by Verna Mims, RN  Medication Review/Management: medications reviewed

## 2024-11-18 NOTE — ANESTHESIA PREPROCEDURE EVALUATION
Anesthesia Evaluation     Patient summary reviewed and Nursing notes reviewed   NPO Solid Status: > 8 hours  NPO Liquid Status: > 2 hours           Airway   Mallampati: I  TM distance: >3 FB  Neck ROM: full  No difficulty expected  Dental    (+) edentulous and upper dentures        Pulmonary    (+) a smoker Former, cigarettes, asthma,  (-) shortness of breath, recent URI, sleep apnea    ROS comment: Sats 97% RA   Cardiovascular     ECG reviewed    (+) hypertension  (-) past MI, dysrhythmias, angina, cardiac stents    ROS comment: ECG ST frequent PVCs Possible LAE   ECHO 2020 normal mild concen LVH   MPS 2020 normal  no evidence of ischemia EF = 54%          Neuro/Psych  (+) headaches, psychiatric history  (-) seizures, CVA  GI/Hepatic/Renal/Endo    (+) GERD, GI bleeding , renal disease (improved creatnine - now <1)- ARF  (-) diabetes, no thyroid disorder    ROS Comment: Barretts     Musculoskeletal     Abdominal    Substance History   (+) alcohol use (sober 4 years)     OB/GYN          Other        ROS/Med Hx Other: Worsening epigastric pain, dysphagia, nausea, and blood-tinged bilious emesis. Unable to eat. She has dehydration, PAULETTE, and metabolic acidosis    At risk for PUD ibuprofen  Creat normal now HCT 40       Phys Exam Other: Few lower teeth               Anesthesia Plan    ASA 3     general and MAC     (PFL    (ETT ready - on antiemetics) )  intravenous induction     Anesthetic plan, risks, benefits, and alternatives have been provided, discussed and informed consent has been obtained with: patient.    Plan discussed with CRNA.    CODE STATUS:    Level Of Support Discussed With: Patient  Code Status (Patient has no pulse and is not breathing): CPR (Attempt to Resuscitate)  Medical Interventions (Patient has pulse or is breathing): Full Support

## 2024-11-19 VITALS
DIASTOLIC BLOOD PRESSURE: 59 MMHG | HEART RATE: 70 BPM | WEIGHT: 210 LBS | HEIGHT: 70 IN | RESPIRATION RATE: 18 BRPM | TEMPERATURE: 98.2 F | OXYGEN SATURATION: 97 % | SYSTOLIC BLOOD PRESSURE: 114 MMHG | BODY MASS INDEX: 30.06 KG/M2

## 2024-11-19 LAB
ANION GAP SERPL CALCULATED.3IONS-SCNC: 9 MMOL/L (ref 5–15)
BASOPHILS # BLD AUTO: 0.04 10*3/MM3 (ref 0–0.2)
BASOPHILS NFR BLD AUTO: 0.8 % (ref 0–1.5)
BH CV ECHO MEAS - DIST REN A EDV LEFT: 45.8 CM/S
BH CV ECHO MEAS - DIST REN A PSV LEFT: 141 CM/S
BH CV ECHO MEAS - MID REN A EDV LEFT: 33.1 CM/S
BH CV ECHO MEAS - MID REN A PSV LEFT: 106 CM/S
BH CV ECHO MEAS - PROX REN A EDV LEFT: 28.2 CM/S
BH CV ECHO MEAS - PROX REN A PSV LEFT: 116 CM/S
BH CV VAS BP LEFT ARM: NORMAL MMHG
BH CV VAS KIDNEY HEIGHT LEFT: 3.8 CM
BH CV VAS RENAL AORTIC MID EDV: 24.8 CM/S
BH CV VAS RENAL AORTIC MID PSV: 118 CM/S
BH CV VAS RENAL HILUM LEFT EDV: 30.1 CM/S
BH CV VAS RENAL HILUM LEFT PSV: 81.7 CM/S
BH CV VAS RENAL HILUM RIGHT EDV: 26.3 CM/S
BH CV VAS RENAL HILUM RIGHT PSV: 80.8 CM/S
BH CV XLRA MEAS - KID L LEFT: 10.8 CM
BH CV XLRA MEAS DIST REN A EDV RIGHT: 31.2 CM/S
BH CV XLRA MEAS DIST REN A PSV RIGHT: 99.3 CM/S
BH CV XLRA MEAS KID H RIGHT: 4.4 CM
BH CV XLRA MEAS KID L RIGHT: 9.6 CM
BH CV XLRA MEAS KID W RIGHT: 6 CM
BH CV XLRA MEAS MID REN A EDV RIGHT: 36 CM/S
BH CV XLRA MEAS MID REN A PSV RIGHT: 130 CM/S
BH CV XLRA MEAS PROX REN A EDV RIGHT: -28.2 CM/S
BH CV XLRA MEAS PROX REN A PSV RIGHT: -114 CM/S
BH CV XLRA MEAS RAR LEFT: 1.19
BH CV XLRA MEAS RAR RIGHT: 1.1
BH CV XLRA MEAS RENAL A ORG EDV LEFT: 29 CM/S
BH CV XLRA MEAS RENAL A ORG EDV RIGHT: 16.5 CM/S
BH CV XLRA MEAS RENAL A ORG PSV LEFT: 110 CM/S
BH CV XLRA MEAS RENAL A ORG PSV RIGHT: 90.8 CM/S
BUN SERPL-MCNC: 19 MG/DL (ref 6–20)
BUN/CREAT SERPL: 19.4 (ref 7–25)
CALCIUM SPEC-SCNC: 8.3 MG/DL (ref 8.6–10.5)
CHLORIDE SERPL-SCNC: 102 MMOL/L (ref 98–107)
CO2 SERPL-SCNC: 26 MMOL/L (ref 22–29)
CREAT SERPL-MCNC: 0.98 MG/DL (ref 0.57–1)
DEPRECATED RDW RBC AUTO: 48.3 FL (ref 37–54)
EGFRCR SERPLBLD CKD-EPI 2021: 73.6 ML/MIN/1.73
EOSINOPHIL # BLD AUTO: 0.16 10*3/MM3 (ref 0–0.4)
EOSINOPHIL NFR BLD AUTO: 3.3 % (ref 0.3–6.2)
ERYTHROCYTE [DISTWIDTH] IN BLOOD BY AUTOMATED COUNT: 14.4 % (ref 12.3–15.4)
GLUCOSE SERPL-MCNC: 99 MG/DL (ref 65–99)
HCT VFR BLD AUTO: 35.8 % (ref 34–46.6)
HGB BLD-MCNC: 11.5 G/DL (ref 12–15.9)
IMM GRANULOCYTES # BLD AUTO: 0.01 10*3/MM3 (ref 0–0.05)
IMM GRANULOCYTES NFR BLD AUTO: 0.2 % (ref 0–0.5)
LEFT KIDNEY WIDTH: 4.5 CM
LYMPHOCYTES # BLD AUTO: 1.59 10*3/MM3 (ref 0.7–3.1)
LYMPHOCYTES NFR BLD AUTO: 33.1 % (ref 19.6–45.3)
MCH RBC QN AUTO: 29.2 PG (ref 26.6–33)
MCHC RBC AUTO-ENTMCNC: 32.1 G/DL (ref 31.5–35.7)
MCV RBC AUTO: 90.9 FL (ref 79–97)
MONOCYTES # BLD AUTO: 0.57 10*3/MM3 (ref 0.1–0.9)
MONOCYTES NFR BLD AUTO: 11.9 % (ref 5–12)
NEUTROPHILS NFR BLD AUTO: 2.44 10*3/MM3 (ref 1.7–7)
NEUTROPHILS NFR BLD AUTO: 50.7 % (ref 42.7–76)
NRBC BLD AUTO-RTO: 0 /100 WBC (ref 0–0.2)
PLATELET # BLD AUTO: 291 10*3/MM3 (ref 140–450)
PMV BLD AUTO: 9.7 FL (ref 6–12)
POTASSIUM SERPL-SCNC: 4.1 MMOL/L (ref 3.5–5.2)
RBC # BLD AUTO: 3.94 10*6/MM3 (ref 3.77–5.28)
SODIUM SERPL-SCNC: 137 MMOL/L (ref 136–145)
WBC NRBC COR # BLD AUTO: 4.81 10*3/MM3 (ref 3.4–10.8)

## 2024-11-19 PROCEDURE — 63710000001 NEBIVOLOL 2.5 MG TABLET: Performed by: INTERNAL MEDICINE

## 2024-11-19 PROCEDURE — 99239 HOSP IP/OBS DSCHRG MGMT >30: CPT | Performed by: HOSPITALIST

## 2024-11-19 PROCEDURE — 63710000001 HYDROCHLOROTHIAZIDE 25 MG TABLET: Performed by: INTERNAL MEDICINE

## 2024-11-19 PROCEDURE — 63710000001 FLUOXETINE 20 MG CAPSULE: Performed by: INTERNAL MEDICINE

## 2024-11-19 PROCEDURE — A9270 NON-COVERED ITEM OR SERVICE: HCPCS | Performed by: INTERNAL MEDICINE

## 2024-11-19 PROCEDURE — 94799 UNLISTED PULMONARY SVC/PX: CPT

## 2024-11-19 PROCEDURE — 63710000001 PANTOPRAZOLE 40 MG TABLET DELAYED-RELEASE: Performed by: INTERNAL MEDICINE

## 2024-11-19 PROCEDURE — 63710000001 BUPROPION XL 150 MG TABLET SUSTAINED-RELEASE 24 HOUR: Performed by: INTERNAL MEDICINE

## 2024-11-19 PROCEDURE — 63710000001 VALACYCLOVIR 500 MG TABLET: Performed by: INTERNAL MEDICINE

## 2024-11-19 PROCEDURE — G0378 HOSPITAL OBSERVATION PER HR: HCPCS

## 2024-11-19 PROCEDURE — 99232 SBSQ HOSP IP/OBS MODERATE 35: CPT | Performed by: INTERNAL MEDICINE

## 2024-11-19 PROCEDURE — 25010000002 MORPHINE PER 10 MG: Performed by: INTERNAL MEDICINE

## 2024-11-19 PROCEDURE — 94761 N-INVAS EAR/PLS OXIMETRY MLT: CPT

## 2024-11-19 PROCEDURE — 94664 DEMO&/EVAL PT USE INHALER: CPT

## 2024-11-19 PROCEDURE — 63710000001 ACETAMINOPHEN 325 MG TABLET: Performed by: INTERNAL MEDICINE

## 2024-11-19 PROCEDURE — 63710000001 VALSARTAN 160 MG TABLET: Performed by: INTERNAL MEDICINE

## 2024-11-19 PROCEDURE — 80048 BASIC METABOLIC PNL TOTAL CA: CPT | Performed by: INTERNAL MEDICINE

## 2024-11-19 PROCEDURE — 63710000001 SUCRALFATE 1 G TABLET: Performed by: INTERNAL MEDICINE

## 2024-11-19 PROCEDURE — 25010000002 PROCHLORPERAZINE 10 MG/2ML SOLUTION: Performed by: INTERNAL MEDICINE

## 2024-11-19 PROCEDURE — 63710000001 NIFEDIPINE XL 30 MG TABLET SUSTAINED-RELEASE 24 HOUR: Performed by: INTERNAL MEDICINE

## 2024-11-19 PROCEDURE — 85025 COMPLETE CBC W/AUTO DIFF WBC: CPT | Performed by: INTERNAL MEDICINE

## 2024-11-19 RX ORDER — PANTOPRAZOLE SODIUM 40 MG/1
40 TABLET, DELAYED RELEASE ORAL
Qty: 60 TABLET | Refills: 0 | Status: SHIPPED | OUTPATIENT
Start: 2024-11-19 | End: 2024-12-19

## 2024-11-19 RX ORDER — HYDROCHLOROTHIAZIDE 12.5 MG/1
12.5 TABLET ORAL DAILY
Qty: 30 TABLET | Refills: 0 | Status: SHIPPED | OUTPATIENT
Start: 2024-11-20 | End: 2024-11-25

## 2024-11-19 RX ORDER — SUCRALFATE 1 G/1
1 TABLET ORAL
Qty: 23 TABLET | Refills: 0 | Status: SHIPPED | OUTPATIENT
Start: 2024-11-19 | End: 2024-11-25

## 2024-11-19 RX ADMIN — FLUOXETINE HYDROCHLORIDE 40 MG: 20 CAPSULE ORAL at 08:58

## 2024-11-19 RX ADMIN — SUCRALFATE 1 G: 1 TABLET ORAL at 12:24

## 2024-11-19 RX ADMIN — HYDROCHLOROTHIAZIDE 12.5 MG: 25 TABLET ORAL at 08:57

## 2024-11-19 RX ADMIN — BUPROPION HYDROCHLORIDE 300 MG: 150 TABLET, EXTENDED RELEASE ORAL at 09:04

## 2024-11-19 RX ADMIN — ACETAMINOPHEN 650 MG: 325 TABLET ORAL at 12:37

## 2024-11-19 RX ADMIN — Medication 10 ML: at 09:04

## 2024-11-19 RX ADMIN — VALSARTAN 160 MG: 160 TABLET, FILM COATED ORAL at 08:58

## 2024-11-19 RX ADMIN — BUDESONIDE AND FORMOTEROL FUMARATE DIHYDRATE 1 PUFF: 160; 4.5 AEROSOL RESPIRATORY (INHALATION) at 11:12

## 2024-11-19 RX ADMIN — PANTOPRAZOLE SODIUM 40 MG: 40 TABLET, DELAYED RELEASE ORAL at 08:57

## 2024-11-19 RX ADMIN — MORPHINE SULFATE 2 MG: 2 INJECTION, SOLUTION INTRAMUSCULAR; INTRAVENOUS at 05:49

## 2024-11-19 RX ADMIN — NIFEDIPINE 30 MG: 30 TABLET, FILM COATED, EXTENDED RELEASE ORAL at 08:58

## 2024-11-19 RX ADMIN — VALACYCLOVIR HYDROCHLORIDE 1000 MG: 500 TABLET, FILM COATED ORAL at 08:58

## 2024-11-19 RX ADMIN — BUPROPION HYDROCHLORIDE 150 MG: 150 TABLET, EXTENDED RELEASE ORAL at 08:57

## 2024-11-19 RX ADMIN — PROCHLORPERAZINE EDISYLATE 10 MG: 5 INJECTION INTRAMUSCULAR; INTRAVENOUS at 12:38

## 2024-11-19 RX ADMIN — SUCRALFATE 1 G: 1 TABLET ORAL at 09:04

## 2024-11-19 RX ADMIN — NEBIVOLOL 5 MG: 2.5 TABLET ORAL at 08:58

## 2024-11-19 RX ADMIN — PROCHLORPERAZINE EDISYLATE 10 MG: 5 INJECTION INTRAMUSCULAR; INTRAVENOUS at 05:49

## 2024-11-19 NOTE — PLAN OF CARE
Problem: Adult Inpatient Plan of Care  Goal: Absence of Hospital-Acquired Illness or Injury  Intervention: Identify and Manage Fall Risk  Recent Flowsheet Documentation  Taken 11/19/2024 0441 by Lorrie Sanchez, RN  Safety Promotion/Fall Prevention:   activity supervised   assistive device/personal items within reach   clutter free environment maintained   fall prevention program maintained   lighting adjusted   nonskid shoes/slippers when out of bed   safety round/check completed   room organization consistent  Taken 11/19/2024 0241 by Lorrie Sanchez, RN  Safety Promotion/Fall Prevention:   activity supervised   assistive device/personal items within reach   clutter free environment maintained   fall prevention program maintained   lighting adjusted   nonskid shoes/slippers when out of bed   room organization consistent   safety round/check completed  Taken 11/19/2024 0041 by Lorrie Sanchez, RN  Safety Promotion/Fall Prevention:   activity supervised   assistive device/personal items within reach   clutter free environment maintained   fall prevention program maintained   lighting adjusted   nonskid shoes/slippers when out of bed   room organization consistent   safety round/check completed  Taken 11/18/2024 2241 by Lorrie Sanchez, RN  Safety Promotion/Fall Prevention:   activity supervised   assistive device/personal items within reach   clutter free environment maintained   fall prevention program maintained   lighting adjusted   nonskid shoes/slippers when out of bed   room organization consistent   safety round/check completed  Taken 11/18/2024 2141 by Lorrie Sanchez, RN  Safety Promotion/Fall Prevention:   activity supervised   assistive device/personal items within reach   clutter free environment maintained   fall prevention program maintained   lighting adjusted   nonskid shoes/slippers when out of bed   room organization consistent   safety round/check completed  Intervention: Prevent Skin  Injury  Recent Flowsheet Documentation  Taken 11/19/2024 0441 by Lorrie Sanchez RN  Body Position: position changed independently  Taken 11/19/2024 0241 by Lorrie Sanchez RN  Body Position: position changed independently  Taken 11/19/2024 0041 by Lorrie Sanchez RN  Body Position: position changed independently  Taken 11/18/2024 2241 by oLrrie Sanchez RN  Body Position: position changed independently  Taken 11/18/2024 2141 by Lorrie Sanchez RN  Body Position: position changed independently  Intervention: Prevent Infection  Recent Flowsheet Documentation  Taken 11/19/2024 0441 by Lorrie Sanchez RN  Infection Prevention:   environmental surveillance performed   hand hygiene promoted   single patient room provided   rest/sleep promoted  Taken 11/19/2024 0241 by Lorrie Sanchez RN  Infection Prevention:   environmental surveillance performed   hand hygiene promoted   rest/sleep promoted   single patient room provided  Taken 11/19/2024 0041 by Lorrie Sanchez RN  Infection Prevention:   environmental surveillance performed   hand hygiene promoted   rest/sleep promoted   single patient room provided  Taken 11/18/2024 2241 by Lorrie Sanchez RN  Infection Prevention:   environmental surveillance performed   hand hygiene promoted   rest/sleep promoted   single patient room provided  Taken 11/18/2024 2141 by Lorrie Sanchez RN  Infection Prevention:   environmental surveillance performed   hand hygiene promoted   rest/sleep promoted   single patient room provided  Goal: Optimal Comfort and Wellbeing  Intervention: Provide Person-Centered Care  Recent Flowsheet Documentation  Taken 11/18/2024 2141 by Lorrie Sanchez RN  Trust Relationship/Rapport:   care explained   choices provided   questions encouraged   questions answered   thoughts/feelings acknowledged   Goal Outcome Evaluation:

## 2024-11-19 NOTE — PLAN OF CARE
Problem: Adult Inpatient Plan of Care  Goal: Plan of Care Review  Outcome: Met  Goal: Patient-Specific Goal (Individualized)  Outcome: Met  Goal: Absence of Hospital-Acquired Illness or Injury  Outcome: Met  Goal: Optimal Comfort and Wellbeing  Outcome: Met  Goal: Readiness for Transition of Care  Outcome: Met     Problem: Nausea and Vomiting  Goal: Nausea and Vomiting Relief  Outcome: Met     Problem: Comorbidity Management  Goal: Blood Pressure in Desired Range  Outcome: Met   Goal Outcome Evaluation:

## 2024-11-19 NOTE — PROGRESS NOTES
Bebe Rincon  [id number]  1980   LOS: 0 days   Patient Care Team:  Tracie Meraz APRN as PCP - General (Nurse Practitioner)  Shadi Lester III, MD as Cardiologist (Cardiology)    Chief Complaint:  SEVERE HTN / GERD / CP / DYSLIPIDEMIA    Subjective     Comfortable supine in bed this morning off supplemental oxygen therapy (room air oximetry 98%).  No anginal type chest discomfort, abdominal pain, or emesis but still has intermittent nausea.  She was able to take liquids and eat supper last night.  No current headache or focal motor-sensory changes.    Objective     Vital Sign Min/Max for last 24 hours  Temp  Min: 97 °F (36.1 °C)  Max: 98.5 °F (36.9 °C)   BP  Min: 106/74  Max: 150/88   Pulse  Min: 74  Max: 99   Resp  Min: 14  Max: 18   SpO2  Min: 96 %  Max: 100 %      Weight  Min: 95.3 kg (210 lb)  Max: 95.3 kg (210 lb)         11/17/24 2018 11/18/24  1434   Weight: 95.5 kg (210 lb 9.6 oz) 95.3 kg (210 lb)       No intake or output data in the 24 hours ending 11/19/24 0729    Physical Exam:     General Appearance:    Alert, cooperative, in no acute distress   Lungs:     Clear to auscultation,respirations regular, even and                unlabored    Heart:    Regular and normal rate, normal S1 and S2, no            murmur, no gallop, no rub, no click   Abdomen:  Extremities:   Soft, nontender, bowel sounds audible x4    No edema, normal range of motion   Pulses:   Pulses palpable and equal bilaterally      Results Review:   Results from last 7 days   Lab Units 11/19/24  0503 11/18/24  2110 11/18/24  0502 11/17/24  1421   SODIUM mmol/L 137  --  136 136   POTASSIUM mmol/L 4.1 4.0 3.4* 3.8   CHLORIDE mmol/L 102  --  99 95*   CO2 mmol/L 26.0  --  25.0 25.0   BUN mg/dL 19  --  20 24*   CREATININE mg/dL 0.98  --  0.98 1.37*   GLUCOSE mg/dL 99  --  83 98   CALCIUM mg/dL 8.3*  --  8.9 10.8*     Results from last 7 days   Lab Units 11/19/24  0503 11/18/24  0502 11/17/24  1421   WBC 10*3/mm3 4.81 5.19  9.67   HEMOGLOBIN g/dL 11.5* 13.3 14.8   HEMATOCRIT % 35.8 40.5 43.7   PLATELETS 10*3/mm3 291 298 365     Results from last 7 days   Lab Units 11/18/24  0502   HEMOGLOBIN A1C % 5.10     Results from last 7 days   Lab Units 11/17/24  1421   HSTROP T ng/L 9     RENAL ARTERY DUPLEX: PENDING    EKG:    Normal sinus rhythm  Possible Left atrial enlargement  Cannot rule out Anterior infarct (cited on or before 14-Dec-2018)  Abnormal ECG  When compared with ECG of 17-Nov-2024 13:52,  premature ventricular complexes are no longer present  Vent. rate has decreased by  56 bpm  T wave inversion now evident in Anterior leads    ECHO:       Left ventricular systolic function is normal. Calculated left ventricular EF = 60.7% Left ventricular ejection fraction appears to be 61 - 65%.    Left ventricular diastolic function was normal.    EGD:     LA grade D erosive esophagitis without bleeding with biopsies pending in the setting of 2 cm hiatal hernia and erythematous stomach mucosa with normal duodenum    Medication Review: REVIEWED; NO DRIPS.    Assessment & Plan     Improved control of blood pressure.  Doubt unstable angina pectoris and would defer MPS/LHC.  EGD results noted.  Diet and mobilization this morning encouraged.  She will need to discuss treatment of dyslipidemia with her primary care physician or Dr. Lester during outpatient cardiology follow-up; reluctant to add additional medication at this time.  Would defer prescription for ASA at this time.  Hopefully home later today with tentative discharge cardiac medications and cardiology follow-up as outlined below:    Nebivolol 5 mg daily  Valsartan HCTZ 120/12.5 daily  Nifedipine XL 30 mg daily  [facility] follow-up 2-3 weeks or follow-up with primary care physician to reassess blood pressure  [facility] follow-up with Dr. Shadi Lester as scheduled on 16 December 2024    We will standby and be available to see as needed during current  hospitalization.      Nausea & vomiting    Epigastric abdominal pain    Hematemesis with nausea    Pharyngoesophageal dysphagia    Salgado esophagus    Resistant hypertension    Bipolar 1 disorder    History of alcohol abuse    Elevated serum creatinine    Serum calcium elevated    Weight gain, abnormal    11/19/24  07:29 EST

## 2024-11-20 LAB
CYTO UR: NORMAL
LAB AP CASE REPORT: NORMAL
LAB AP CLINICAL INFORMATION: NORMAL
LAB AP SPECIAL STAINS: NORMAL
PATH REPORT.FINAL DX SPEC: NORMAL
PATH REPORT.GROSS SPEC: NORMAL

## 2024-11-21 LAB
QT INTERVAL: 462 MS
QTC INTERVAL: 495 MS

## 2024-11-22 DIAGNOSIS — K20.90 ESOPHAGITIS: Primary | ICD-10-CM

## 2024-11-22 NOTE — PROGRESS NOTES
Please ensure patient has office follow up in GI clinic in the next 4-6 weeks.  Pathology from recent upper endoscopy with biopsy as follows:    Biopsies from the stomach show benign reactive changes with no evidence of H pylori.  Biopsies from the esophagus show benign changes of reflux with no changes to suggest viral infection.    Continue PPI twice daily.   Complete course of carafate.   Follow up in GI office.   Repeat EGD in 10-12 weeks to ensure healing of esophagus.  If continue to have evidence of esophagitis, then may need to consider transition to [place].

## 2024-11-25 ENCOUNTER — OFFICE VISIT (OUTPATIENT)
Dept: CARDIOLOGY | Facility: CLINIC | Age: 44
End: 2024-11-25
Payer: MEDICARE

## 2024-11-25 VITALS
WEIGHT: 214.4 LBS | HEART RATE: 97 BPM | DIASTOLIC BLOOD PRESSURE: 90 MMHG | SYSTOLIC BLOOD PRESSURE: 140 MMHG | HEIGHT: 70 IN | OXYGEN SATURATION: 98 % | BODY MASS INDEX: 30.69 KG/M2

## 2024-11-25 DIAGNOSIS — I1A.0 RESISTANT HYPERTENSION: Primary | Chronic | ICD-10-CM

## 2024-11-25 RX ORDER — TRIAMTERENE AND HYDROCHLOROTHIAZIDE 75; 50 MG/1; MG/1
1 TABLET ORAL DAILY
Qty: 90 TABLET | Refills: 3 | Status: SHIPPED | OUTPATIENT
Start: 2024-11-25

## 2024-11-25 RX ORDER — SACUBITRIL AND VALSARTAN 97; 103 MG/1; MG/1
1 TABLET, FILM COATED ORAL 2 TIMES DAILY
Qty: 60 TABLET | Refills: 5 | Status: SHIPPED | OUTPATIENT
Start: 2024-11-25

## 2024-11-25 NOTE — PROGRESS NOTES
[facility]  Office visit  Bebe Rincon  1980  [phone]  There is no work phone number on file.    VISIT DATE:  11/25/2024    PCP: Tracie Meraz APRN  [address]  [place] [address]    CC:  Chief Complaint   Patient presents with    Essential hypertension       Previous cardiac studies and procedures:  12/2020   exercise myocardial perfusion imaging  Patient denied chest pain with exercise  Expected exercise time 9:10 Actual time 5:31 TIARRA +35. Patient requested to stop secondary to dizziness.  Patient was hypertensive at baseline with /100 mmHg. Peak blood pressure 200/120 mmHg.  No ischemic EKG changes with exercise  Myocardial perfusion imaging indicates a normal myocardial perfusion study with no evidence of ischemia.  Left ventricular ejection fraction is normal. (Calculated EF = 54%).  No significant coronary artery calcification  Impression: No EKG or perfusion evidence of ischemia. Patient does have significant resting hypertension and hypertensive response to exercise.  TTE  Calculated left ventricular EF = 55% Left ventricular systolic function is normal.  Left ventricular wall thickness is consistent with mild concentric hypertrophy  Cardiac valves appear structurally and functionally normal    ASSESSMENT:   Diagnosis Plan   1. Resistant hypertension  Basic Metabolic Panel    Compression Stockings          PLAN:  Resistant hypertension: Goal less than 130/80 mmHg.  Renovascular duplex negative.    -Increasing triamterene hydrochlorothiazide 75-50  -Transitioning valsartan to Entresto  mg p.o. twice daily.  -continue Bystolic 20 mg p.o. daily.  Continue hydralazine to 50 mg p.o. every 8 hours for symptomatic hypertension.    -BMP in 1 week.    -Developed edema and fatigue on calcium channel blockade.    Subjective  Initial evaluation: Recently evaluated for nausea vomiting secondary to severe distal esophagitis.   "Reviewed hospitalization.  Blood pressure still frequently running higher than 140/90 mmHg.    Initial evaluation: 44-year-old female with longstanding history of resistant hypertension. Was previously relatively well-controlled prior to the previous 3 to 4 weeks when she had an unexplained worsening in her baseline blood pressure. She has had frequent blood pressures greater than 180/100 mmHg with associated fatigue and headache. Reviewed recent ED evaluation. She appears compliant with medical therapy. Non-smoker. No significant alcohol or caffeine use. Clonidine has not been effective as a as needed medication to lower blood pressure. GI intolerance to spironolactone. Previously developed hypokalemia with thiazide diuretic but appear to help control her blood pressure well.     PHYSICAL EXAMINATION:  Vitals:    11/25/24 1045   BP: 140/90   BP Location: Left arm   Patient Position: Sitting   Cuff Size: Adult   Pulse: 97   SpO2: 98%   Weight: 97.3 kg (214 lb 6.4 oz)   Height: 177.8 cm (70\")     General Appearance:    Alert, cooperative, no distress, appears stated age   Head:    Normocephalic, without obvious abnormality, atraumatic   Eyes:    conjunctiva/corneas clear   Nose:   Nares normal, septum midline, mucosa normal, no drainage   Throat:   Lips, teeth and gums normal   Neck:   Supple, symmetrical, trachea midline, no carotid    bruit or JVD   Lungs:     Clear to auscultation bilaterally, respirations unlabored   Chest Wall:    No tenderness or deformity    Heart:    Regular rate and rhythm, S1 and S2 normal, no murmur, rub   or gallop, normal carotid impulse bilaterally without bruit.   Abdomen:     Soft, non-tender   Extremities:   Extremities normal, atraumatic, no cyanosis or edema   Pulses:   2+ and symmetric all extremities   Skin:   Skin color, texture, turgor normal, no rashes or lesions       Diagnostic Data:  Procedures  Lab Results   Component Value Date    TRIG 122 03/08/2018    HDL 64 (H) " 03/08/2018     Lab Results   Component Value Date    GLUCOSE 99 11/19/2024    BUN 19 11/19/2024    CREATININE 0.98 11/19/2024     11/19/2024    K 4.1 11/19/2024     11/19/2024    CO2 26.0 11/19/2024     Lab Results   Component Value Date    HGBA1C 5.10 11/18/2024     Lab Results   Component Value Date    WBC 4.81 11/19/2024    HGB 11.5 (L) 11/19/2024    HCT 35.8 11/19/2024     11/19/2024       Allergies  Allergies   Allergen Reactions    Lamictal [Lamotrigine] Anaphylaxis    Amlodipine Irritability    Rocephin [Ceftriaxone] Hives    Tetracyclines & Related Other (See Comments)     Low h&h       Current Medications    Current Outpatient Medications:     buPROPion XL (WELLBUTRIN XL) 150 MG 24 hr tablet, Take 1 tablet by mouth Daily., Disp: , Rfl:     buPROPion XL (WELLBUTRIN XL) 300 MG 24 hr tablet, Take 1 tablet by mouth Daily., Disp: , Rfl:     EPINEPHrine (EPIPEN) 0.3 MG/0.3ML solution auto-injector injection, INJECT 1 PEN IN THE MUSCLE ONE TIME AS DIRECTED IF STUNG BY WASP THEN GO TO EMERGENCY ROOM, Disp: , Rfl:     FLUoxetine (PROzac) 40 MG capsule, Take 1 capsule by mouth Daily., Disp: , Rfl:     Fluticasone-Salmeterol (ADVAIR/WIXELA) 100-50 MCG/ACT DISKUS, Inhale 1 puff 2 (Two) Times a Day., Disp: , Rfl:     hydrALAZINE (APRESOLINE) 50 MG tablet, Take 1 tablet by mouth Every 8 (Eight) Hours As Needed (BP >180/100 mm Hg)., Disp: 30 tablet, Rfl: 5    hydrOXYzine (ATARAX) 25 MG tablet, , Disp: , Rfl:     naloxone (NARCAN) 4 MG/0.1ML nasal spray, , Disp: , Rfl:     nebivolol (Bystolic) 20 MG tablet, Take 1 tablet by mouth Daily., Disp: 30 tablet, Rfl: 5    pantoprazole (PROTONIX) 40 MG EC tablet, Take 1 tablet by mouth 2 (Two) Times a Day Before Meals for 30 days., Disp: 60 tablet, Rfl: 0    PROAIR  (90 Base) MCG/ACT inhaler, Inhale 2 puffs Every 6 (Six) Hours As Needed., Disp: , Rfl:     promethazine (PHENERGAN) 25 MG tablet, Take 1 tablet by mouth Every 6 (Six) Hours As Needed for Nausea  or Vomiting., Disp: 10 tablet, Rfl: 0    rOPINIRole (REQUIP) 1 MG tablet, Take 1 tablet by mouth Every Night., Disp: , Rfl:     sucralfate (CARAFATE) 1 g tablet, Take 1 tablet by mouth 4 (Four) Times a Day Before Meals & at Bedtime for 23 doses., Disp: 23 tablet, Rfl: 0    topiramate (TOPAMAX) 200 MG tablet, , Disp: , Rfl:     traZODone (DESYREL) 100 MG tablet, Take 1 tablet by mouth As Needed., Disp: , Rfl:     traZODone (DESYREL) 150 MG tablet, Take 1 tablet by mouth As Needed., Disp: , Rfl:     valACYclovir (VALTREX) 1000 MG tablet, Take 1 tablet by mouth Daily., Disp: , Rfl:     ziprasidone (Geodon) 40 MG capsule, Take 1 capsule by mouth 2 (Two) Times a Day With Meals., Disp: , Rfl:     sacubitril-valsartan (Entresto)  MG tablet, Take 1 tablet by mouth 2 (Two) Times a Day., Disp: 60 tablet, Rfl: 5    triamterene-hydrochlorothiazide (MAXZIDE) 75-50 MG per tablet, Take 1 tablet by mouth Daily., Disp: 90 tablet, Rfl: 3          ROS  ROS      SOCIAL HX  Social History     Socioeconomic History    Marital status:    Tobacco Use    Smoking status: Former     Current packs/day: 0.00     Types: Cigarettes     Start date: 1998     Quit date: 2019     Years since quittin.9     Passive exposure: Past    Smokeless tobacco: Never   Vaping Use    Vaping status: Never Used   Substance and Sexual Activity    Alcohol use: Not Currently     Comment: sober since 2020    Drug use: Not Currently     Comment: 2 years sober    Sexual activity: Not Currently     Partners: Male     Birth control/protection: Hysterectomy, Surgical       FAMILY HX  Family History   Problem Relation Age of Onset    Arthritis Mother     Hypertension Mother     Thyroid disease Mother     Thyroid cancer Mother     Heart disease Father     Hypertension Father     Hyperlipidemia Father     Arrhythmia Father     Heart attack Father     Hypertension Brother     No Known Problems Daughter     No Known Problems Daughter     No Known  Problems Daughter     Heart disease Paternal Uncle     Colon cancer Maternal Grandmother     Heart disease Maternal Grandmother     Breast cancer Paternal Grandmother     Diabetes Paternal Grandmother     Colon cancer Paternal Grandmother     Early death Paternal Grandfather              Shadi Lester III, MD, FACC

## 2024-12-02 ENCOUNTER — LAB (OUTPATIENT)
Dept: LAB | Facility: HOSPITAL | Age: 44
End: 2024-12-02
Payer: MEDICARE

## 2024-12-02 DIAGNOSIS — I1A.0 RESISTANT HYPERTENSION: Chronic | ICD-10-CM

## 2024-12-02 LAB
ANION GAP SERPL CALCULATED.3IONS-SCNC: 13.4 MMOL/L (ref 5–15)
BUN SERPL-MCNC: 17 MG/DL (ref 6–20)
BUN/CREAT SERPL: 12.9 (ref 7–25)
CALCIUM SPEC-SCNC: 9.1 MG/DL (ref 8.6–10.5)
CHLORIDE SERPL-SCNC: 101 MMOL/L (ref 98–107)
CO2 SERPL-SCNC: 21.6 MMOL/L (ref 22–29)
CREAT SERPL-MCNC: 1.32 MG/DL (ref 0.57–1)
EGFRCR SERPLBLD CKD-EPI 2021: 51.5 ML/MIN/1.73
GLUCOSE SERPL-MCNC: 101 MG/DL (ref 65–99)
POTASSIUM SERPL-SCNC: 3.9 MMOL/L (ref 3.5–5.2)
SODIUM SERPL-SCNC: 136 MMOL/L (ref 136–145)

## 2024-12-02 PROCEDURE — 80048 BASIC METABOLIC PNL TOTAL CA: CPT

## 2024-12-02 PROCEDURE — 36415 COLL VENOUS BLD VENIPUNCTURE: CPT

## 2024-12-03 ENCOUNTER — TELEPHONE (OUTPATIENT)
Dept: CARDIOLOGY | Facility: CLINIC | Age: 44
End: 2024-12-03
Payer: MEDICARE

## 2024-12-03 DIAGNOSIS — I10 ESSENTIAL HYPERTENSION: Primary | ICD-10-CM

## 2024-12-03 RX ORDER — TRIAMTERENE AND HYDROCHLOROTHIAZIDE 37.5; 25 MG/1; MG/1
1 TABLET ORAL DAILY
Qty: 30 TABLET | Refills: 2 | Status: SHIPPED | OUTPATIENT
Start: 2024-12-03

## 2024-12-03 NOTE — TELEPHONE ENCOUNTER
----- Message from Shadi Lester sent at 12/3/2024  8:44 AM EST -----  Too much of a change in underlying kidney function with recent medication changes.  Need to decrease triamterene hydrochlorothiazide back to 37.5-25 mg p.o. daily.  Repeat BMP in 4 weeks.

## 2024-12-30 ENCOUNTER — APPOINTMENT (OUTPATIENT)
Dept: CT IMAGING | Facility: HOSPITAL | Age: 44
End: 2024-12-30
Payer: MEDICARE

## 2024-12-30 ENCOUNTER — APPOINTMENT (OUTPATIENT)
Dept: GENERAL RADIOLOGY | Facility: HOSPITAL | Age: 44
End: 2024-12-30
Payer: MEDICARE

## 2024-12-30 ENCOUNTER — APPOINTMENT (OUTPATIENT)
Dept: MRI IMAGING | Facility: HOSPITAL | Age: 44
End: 2024-12-30
Payer: MEDICARE

## 2024-12-30 ENCOUNTER — HOSPITAL ENCOUNTER (EMERGENCY)
Facility: HOSPITAL | Age: 44
Discharge: HOME | End: 2024-12-30
Attending: EMERGENCY MEDICINE | Admitting: EMERGENCY MEDICINE
Payer: MEDICARE

## 2024-12-30 VITALS
OXYGEN SATURATION: 97 % | DIASTOLIC BLOOD PRESSURE: 89 MMHG | WEIGHT: 209 LBS | HEIGHT: 70 IN | SYSTOLIC BLOOD PRESSURE: 141 MMHG | HEART RATE: 70 BPM | TEMPERATURE: 97.9 F | RESPIRATION RATE: 20 BRPM | BODY MASS INDEX: 29.92 KG/M2

## 2024-12-30 DIAGNOSIS — I16.0 HYPERTENSIVE URGENCY: Primary | ICD-10-CM

## 2024-12-30 DIAGNOSIS — M79.609 PARESTHESIA AND PAIN OF LEFT EXTREMITY: ICD-10-CM

## 2024-12-30 DIAGNOSIS — R74.8 ELEVATED LIVER ENZYMES: ICD-10-CM

## 2024-12-30 DIAGNOSIS — R20.2 PARESTHESIA AND PAIN OF LEFT EXTREMITY: ICD-10-CM

## 2024-12-30 DIAGNOSIS — M50.30 DDD (DEGENERATIVE DISC DISEASE), CERVICAL: ICD-10-CM

## 2024-12-30 LAB
ALT SERPL W P-5'-P-CCNC: 65 U/L (ref 1–33)
APTT PPP: 24.5 SECONDS (ref 22–39)
AST SERPL-CCNC: 74 U/L (ref 1–32)
BACTERIA UR QL AUTO: ABNORMAL /HPF
BASOPHILS # BLD AUTO: 0.03 10*3/MM3 (ref 0–0.2)
BASOPHILS NFR BLD AUTO: 0.5 % (ref 0–1.5)
BILIRUB UR QL STRIP: NEGATIVE
BUN BLDA-MCNC: 9 MG/DL (ref 8–26)
CA-I BLDA-SCNC: 1.19 MMOL/L (ref 1.2–1.32)
CHLORIDE BLDA-SCNC: 102 MMOL/L (ref 98–109)
CLARITY UR: CLEAR
CO2 BLDA-SCNC: 25 MMOL/L (ref 24–29)
COLOR UR: YELLOW
CREAT BLDA-MCNC: 1.2 MG/DL (ref 0.6–1.3)
DEPRECATED RDW RBC AUTO: 45.1 FL (ref 37–54)
EGFRCR SERPLBLD CKD-EPI 2021: 57.7 ML/MIN/1.73
EOSINOPHIL # BLD AUTO: 0.32 10*3/MM3 (ref 0–0.4)
EOSINOPHIL NFR BLD AUTO: 5.6 % (ref 0.3–6.2)
ERYTHROCYTE [DISTWIDTH] IN BLOOD BY AUTOMATED COUNT: 14 % (ref 12.3–15.4)
GEN 5 1HR TROPONIN T REFLEX: 9 NG/L
GLUCOSE BLDC GLUCOMTR-MCNC: 105 MG/DL (ref 70–130)
GLUCOSE UR STRIP-MCNC: NEGATIVE MG/DL
HCT VFR BLD AUTO: 36.8 % (ref 34–46.6)
HCT VFR BLDA CALC: 40 % (ref 38–51)
HGB BLD-MCNC: 12.3 G/DL (ref 12–15.9)
HGB BLDA-MCNC: 13.6 G/DL (ref 12–17)
HGB UR QL STRIP.AUTO: ABNORMAL
HOLD SPECIMEN: NORMAL
HYALINE CASTS UR QL AUTO: ABNORMAL /LPF
IMM GRANULOCYTES # BLD AUTO: 0.01 10*3/MM3 (ref 0–0.05)
IMM GRANULOCYTES NFR BLD AUTO: 0.2 % (ref 0–0.5)
INR PPP: 1 (ref 0.89–1.12)
KETONES UR QL STRIP: NEGATIVE
LEUKOCYTE ESTERASE UR QL STRIP.AUTO: ABNORMAL
LYMPHOCYTES # BLD AUTO: 1.48 10*3/MM3 (ref 0.7–3.1)
LYMPHOCYTES NFR BLD AUTO: 26.1 % (ref 19.6–45.3)
MCH RBC QN AUTO: 29.4 PG (ref 26.6–33)
MCHC RBC AUTO-ENTMCNC: 33.4 G/DL (ref 31.5–35.7)
MCV RBC AUTO: 88 FL (ref 79–97)
MONOCYTES # BLD AUTO: 0.55 10*3/MM3 (ref 0.1–0.9)
MONOCYTES NFR BLD AUTO: 9.7 % (ref 5–12)
NEUTROPHILS NFR BLD AUTO: 3.28 10*3/MM3 (ref 1.7–7)
NEUTROPHILS NFR BLD AUTO: 57.9 % (ref 42.7–76)
NITRITE UR QL STRIP: NEGATIVE
NRBC BLD AUTO-RTO: 0 /100 WBC (ref 0–0.2)
NT-PROBNP SERPL-MCNC: 348.5 PG/ML (ref 0–450)
PH UR STRIP.AUTO: 8.5 [PH] (ref 5–8)
PLATELET # BLD AUTO: 272 10*3/MM3 (ref 140–450)
PMV BLD AUTO: 9.4 FL (ref 6–12)
POTASSIUM BLDA-SCNC: 3.3 MMOL/L (ref 3.5–4.9)
PROT UR QL STRIP: NEGATIVE
PROTHROMBIN TIME: 13.3 SECONDS (ref 12.2–14.5)
RBC # BLD AUTO: 4.18 10*6/MM3 (ref 3.77–5.28)
RBC # UR STRIP: ABNORMAL /HPF
REF LAB TEST METHOD: ABNORMAL
SODIUM BLD-SCNC: 141 MMOL/L (ref 138–146)
SP GR UR STRIP: 1.04 (ref 1–1.03)
SQUAMOUS #/AREA URNS HPF: ABNORMAL /HPF
TROPONIN T NUMERIC DELTA: NORMAL
TROPONIN T SERPL HS-MCNC: <6 NG/L
UROBILINOGEN UR QL STRIP: ABNORMAL
WBC # UR STRIP: ABNORMAL /HPF
WBC NRBC COR # BLD AUTO: 5.67 10*3/MM3 (ref 3.4–10.8)
WHOLE BLOOD HOLD COAG: NORMAL
WHOLE BLOOD HOLD SPECIMEN: NORMAL

## 2024-12-30 PROCEDURE — 25510000001 IOPAMIDOL PER 1 ML: Performed by: EMERGENCY MEDICINE

## 2024-12-30 PROCEDURE — 70450 CT HEAD/BRAIN W/O DYE: CPT

## 2024-12-30 PROCEDURE — 84450 TRANSFERASE (AST) (SGOT): CPT | Performed by: EMERGENCY MEDICINE

## 2024-12-30 PROCEDURE — 25010000002 MAGNESIUM SULFATE IN D5W 1G/100ML (PREMIX) 1-5 GM/100ML-% SOLUTION

## 2024-12-30 PROCEDURE — 96375 TX/PRO/DX INJ NEW DRUG ADDON: CPT

## 2024-12-30 PROCEDURE — 99285 EMERGENCY DEPT VISIT HI MDM: CPT

## 2024-12-30 PROCEDURE — 70496 CT ANGIOGRAPHY HEAD: CPT

## 2024-12-30 PROCEDURE — 36415 COLL VENOUS BLD VENIPUNCTURE: CPT

## 2024-12-30 PROCEDURE — 85730 THROMBOPLASTIN TIME PARTIAL: CPT | Performed by: EMERGENCY MEDICINE

## 2024-12-30 PROCEDURE — 25010000002 PROCHLORPERAZINE 10 MG/2ML SOLUTION

## 2024-12-30 PROCEDURE — 70498 CT ANGIOGRAPHY NECK: CPT

## 2024-12-30 PROCEDURE — 72141 MRI NECK SPINE W/O DYE: CPT

## 2024-12-30 PROCEDURE — 25010000002 DIPHENHYDRAMINE PER 50 MG

## 2024-12-30 PROCEDURE — 84484 ASSAY OF TROPONIN QUANT: CPT | Performed by: NURSE PRACTITIONER

## 2024-12-30 PROCEDURE — 25810000003 SODIUM CHLORIDE 0.9 % SOLUTION

## 2024-12-30 PROCEDURE — 84460 ALANINE AMINO (ALT) (SGPT): CPT | Performed by: EMERGENCY MEDICINE

## 2024-12-30 PROCEDURE — 93005 ELECTROCARDIOGRAM TRACING: CPT

## 2024-12-30 PROCEDURE — 85025 COMPLETE CBC W/AUTO DIFF WBC: CPT | Performed by: EMERGENCY MEDICINE

## 2024-12-30 PROCEDURE — 83880 ASSAY OF NATRIURETIC PEPTIDE: CPT | Performed by: NURSE PRACTITIONER

## 2024-12-30 PROCEDURE — 96366 THER/PROPH/DIAG IV INF ADDON: CPT

## 2024-12-30 PROCEDURE — 81001 URINALYSIS AUTO W/SCOPE: CPT

## 2024-12-30 PROCEDURE — 93005 ELECTROCARDIOGRAM TRACING: CPT | Performed by: EMERGENCY MEDICINE

## 2024-12-30 PROCEDURE — 70551 MRI BRAIN STEM W/O DYE: CPT

## 2024-12-30 PROCEDURE — 71045 X-RAY EXAM CHEST 1 VIEW: CPT

## 2024-12-30 PROCEDURE — 96365 THER/PROPH/DIAG IV INF INIT: CPT

## 2024-12-30 PROCEDURE — 85610 PROTHROMBIN TIME: CPT | Performed by: EMERGENCY MEDICINE

## 2024-12-30 PROCEDURE — 0042T HC CT CEREBRAL PERFUSION W/WO CONTRAST: CPT

## 2024-12-30 PROCEDURE — 85014 HEMATOCRIT: CPT | Performed by: EMERGENCY MEDICINE

## 2024-12-30 PROCEDURE — 80047 BASIC METABLC PNL IONIZED CA: CPT | Performed by: EMERGENCY MEDICINE

## 2024-12-30 RX ORDER — POTASSIUM CHLORIDE 750 MG/1
20 CAPSULE, EXTENDED RELEASE ORAL ONCE
Status: COMPLETED | OUTPATIENT
Start: 2024-12-30 | End: 2024-12-30

## 2024-12-30 RX ORDER — ASPIRIN 81 MG/1
81 TABLET, CHEWABLE ORAL DAILY
Status: DISCONTINUED | OUTPATIENT
Start: 2024-12-30 | End: 2024-12-30 | Stop reason: HOSPADM

## 2024-12-30 RX ORDER — SODIUM CHLORIDE 0.9 % (FLUSH) 0.9 %
10 SYRINGE (ML) INJECTION AS NEEDED
Status: DISCONTINUED | OUTPATIENT
Start: 2024-12-30 | End: 2024-12-30 | Stop reason: HOSPADM

## 2024-12-30 RX ORDER — SODIUM CHLORIDE 0.9 % (FLUSH) 0.9 %
10 SYRINGE (ML) INJECTION EVERY 12 HOURS SCHEDULED
OUTPATIENT
Start: 2024-12-30

## 2024-12-30 RX ORDER — DIPHENHYDRAMINE HYDROCHLORIDE 50 MG/ML
25 INJECTION INTRAMUSCULAR; INTRAVENOUS ONCE
Status: COMPLETED | OUTPATIENT
Start: 2024-12-30 | End: 2024-12-30

## 2024-12-30 RX ORDER — SODIUM CHLORIDE 0.9 % (FLUSH) 0.9 %
10 SYRINGE (ML) INJECTION AS NEEDED
OUTPATIENT
Start: 2024-12-30

## 2024-12-30 RX ORDER — ATORVASTATIN CALCIUM 40 MG/1
80 TABLET, FILM COATED ORAL NIGHTLY
OUTPATIENT
Start: 2024-12-30

## 2024-12-30 RX ORDER — MAGNESIUM SULFATE 1 G/100ML
1 INJECTION INTRAVENOUS ONCE
Status: COMPLETED | OUTPATIENT
Start: 2024-12-30 | End: 2024-12-30

## 2024-12-30 RX ORDER — ASPIRIN 300 MG/1
300 SUPPOSITORY RECTAL DAILY
Status: DISCONTINUED | OUTPATIENT
Start: 2024-12-30 | End: 2024-12-30 | Stop reason: HOSPADM

## 2024-12-30 RX ORDER — PROCHLORPERAZINE EDISYLATE 5 MG/ML
10 INJECTION INTRAMUSCULAR; INTRAVENOUS ONCE
Status: COMPLETED | OUTPATIENT
Start: 2024-12-30 | End: 2024-12-30

## 2024-12-30 RX ORDER — IOPAMIDOL 755 MG/ML
150 INJECTION, SOLUTION INTRAVASCULAR
Status: COMPLETED | OUTPATIENT
Start: 2024-12-30 | End: 2024-12-30

## 2024-12-30 RX ADMIN — PROCHLORPERAZINE EDISYLATE 10 MG: 5 INJECTION INTRAMUSCULAR; INTRAVENOUS at 10:05

## 2024-12-30 RX ADMIN — POTASSIUM CHLORIDE 20 MEQ: 750 CAPSULE, EXTENDED RELEASE ORAL at 10:21

## 2024-12-30 RX ADMIN — DIPHENHYDRAMINE HYDROCHLORIDE 25 MG: 50 INJECTION INTRAMUSCULAR; INTRAVENOUS at 10:05

## 2024-12-30 RX ADMIN — SODIUM CHLORIDE 1000 ML: 9 INJECTION, SOLUTION INTRAVENOUS at 10:05

## 2024-12-30 RX ADMIN — IOPAMIDOL 120 ML: 755 INJECTION, SOLUTION INTRAVENOUS at 09:51

## 2024-12-30 RX ADMIN — ASPIRIN 81 MG 81 MG: 81 TABLET ORAL at 10:44

## 2024-12-30 RX ADMIN — MAGNESIUM SULFATE 1 G: 1 INJECTION INTRAVENOUS at 10:06

## 2024-12-30 NOTE — ED PROVIDER NOTES
EMERGENCY DEPARTMENT ENCOUNTER    Pt Name: Bebe Rincon  MRN: [id number]  Pt :   1980  Room Number:    Date of encounter:  2024  PCP: Tracie Meraz APRN  ED Provider: JOVANNY Mitchell    Historian: Patient      HPI:  Chief Complaint: Generalized fatigue, left arm numbness, blurry vision, hematuria.        Context: Bebe Rincon is a 44 y.o. female who presents to the ED c/o not feeling well, and left arm numbness and tingling, blurry vision, hematuria.  Reports blood in urine last Saturday.  Symptoms resolved now.  About 45 minutes ago she felt pain in her neck and left arm.  She feels numbness through out her whole length of the arm.  Her vision is blurry.  Denies chest pain or shortness of breath.  Compliant with her antihypertensive.  Seeing Dr. Lester for hypertension management      PAST MEDICAL HISTORY  Past Medical History:   Diagnosis Date    ADHD (attention deficit hyperactivity disorder)     Asthma     Salgado esophagus 2024    Bipolar 1 disorder     Bipolar 1 disorder 2024    Hemorrhage     Herpes     History of alcohol abuse 2024    Sober since 2020      Hypertension     Migraine     Resistant hypertension 2024    Urinary tract infection     Weight gain, abnormal 2024    Reports 30 lbs increase unintentional over 4 months           PAST SURGICAL HISTORY  Past Surgical History:   Procedure Laterality Date    CHOLECYSTECTOMY      COLONOSCOPY      unknown    ENDOSCOPY N/A 2024    Procedure: ESOPHAGOGASTRODUODENOSCOPY;  Surgeon: Shadi Frost MD;  Location: [facility] ENDOSCOPY;  Service: Gastroenterology;  Laterality: N/A;    HYSTERECTOMY      OOPHORECTOMY  2015    ROTATOR CUFF REPAIR      ULNAR NERVE REPAIR      UPPER GASTROINTESTINAL ENDOSCOPY  2019    Dr. Arroyo         FAMILY HISTORY  Family History   Problem Relation Age of Onset    Arthritis Mother     Hypertension Mother     Thyroid disease Mother     Thyroid  cancer Mother     Heart disease Father     Hypertension Father     Hyperlipidemia Father     Arrhythmia Father     Heart attack Father     Hypertension Brother     No Known Problems Daughter     No Known Problems Daughter     No Known Problems Daughter     Heart disease Paternal Uncle     Colon cancer Maternal Grandmother     Heart disease Maternal Grandmother     Breast cancer Paternal Grandmother     Diabetes Paternal Grandmother     Colon cancer Paternal Grandmother     Early death Paternal Grandfather          SOCIAL HISTORY  Social History     Socioeconomic History    Marital status:    Tobacco Use    Smoking status: Former     Current packs/day: 0.00     Types: Cigarettes     Start date: 1998     Quit date: 2019     Years since quittin.0     Passive exposure: Past    Smokeless tobacco: Never   Vaping Use    Vaping status: Never Used   Substance and Sexual Activity    Alcohol use: Not Currently     Comment: sober since 2020    Drug use: Not Currently     Comment: 2 years sober    Sexual activity: Not Currently     Partners: Male     Birth control/protection: Hysterectomy, Surgical         ALLERGIES  Lamictal [lamotrigine], Amlodipine, Rocephin [ceftriaxone], and Tetracyclines & related        REVIEW OF SYSTEMS  Review of Systems       All systems reviewed and negative except for those discussed in HPI.       PHYSICAL EXAM    I have reviewed the triage vital signs and nursing notes.    ED Triage Vitals [24 0846]   Temp Heart Rate Resp BP SpO2   97.9 °F (36.6 °C) 120 20 (!) 193/118 100 %      Temp src Heart Rate Source Patient Position BP Location FiO2 (%)   Oral Monitor Sitting Right arm --       Physical Exam  GENERAL:   Appears in no acute distress.  Anxious appearing  HENT: Nares patent.  EYES: No scleral icterus.  CV: Regular rhythm, tachycardia  RESPIRATORY: Normal effort.  No audible wheezes, rales or rhonchi.  ABDOMEN: Soft, nontender  MUSCULOSKELETAL: No deformities.  Full  range of motion of left upper extremity, no midline cervical tenderness  NEURO: Alert, moves all extremities, follows commands.  Mildly decreased left hand   SKIN: Warm, dry, no rash visualized.      LAB RESULTS  Recent Results (from the past 24 hours)   ECG 12 Lead Tachycardia    Collection Time: 12/30/24  8:59 AM   Result Value Ref Range    QT Interval 364 ms    QTC Interval 474 ms   POC CHEM 8    Collection Time: 12/30/24  9:25 AM    Specimen: Blood   Result Value Ref Range    Glucose 105 70 - 130 mg/dL    BUN 9 8 - 26 mg/dL    Creatinine 1.20 0.60 - 1.30 mg/dL    Sodium 141 138 - 146 mmol/L    POC Potassium 3.3 (L) 3.5 - 4.9 mmol/L    Chloride 102 98 - 109 mmol/L    Total CO2 25 24 - 29 mmol/L    Hemoglobin 13.6 12.0 - 17.0 g/dL    Hematocrit 40 38 - 51 %    Ionized Calcium 1.19 (L) 1.20 - 1.32 mmol/L    eGFR 57.7 (L) >60.0 mL/min/1.73   AST    Collection Time: 12/30/24  9:32 AM    Specimen: Blood   Result Value Ref Range    AST (SGOT) 74 (H) 1 - 32 U/L   ALT    Collection Time: 12/30/24  9:32 AM    Specimen: Blood   Result Value Ref Range    ALT (SGPT) 65 (H) 1 - 33 U/L   Green Top (Gel)    Collection Time: 12/30/24  9:32 AM   Result Value Ref Range    Extra Tube Hold for add-ons.    Lavender Top    Collection Time: 12/30/24  9:32 AM   Result Value Ref Range    Extra Tube hold for add-on    Gold Top - SST    Collection Time: 12/30/24  9:32 AM   Result Value Ref Range    Extra Tube Hold for add-ons.    Gray Top    Collection Time: 12/30/24  9:32 AM   Result Value Ref Range    Extra Tube Hold for add-ons.    High Sensitivity Troponin T    Collection Time: 12/30/24  9:32 AM    Specimen: Blood   Result Value Ref Range    HS Troponin T <6 <14 ng/L   proBNP    Collection Time: 12/30/24  9:32 AM    Specimen: Blood   Result Value Ref Range    proBNP 348.5 0.0 - 450.0 pg/mL   Protime-INR    Collection Time: 12/30/24 10:09 AM    Specimen: Blood   Result Value Ref Range    Protime 13.3 12.2 - 14.5 Seconds    INR 1.00  0.89 - 1.12   aPTT    Collection Time: 12/30/24 10:09 AM    Specimen: Blood   Result Value Ref Range    PTT 24.5 22.0 - 39.0 seconds   Light Blue Top    Collection Time: 12/30/24 10:09 AM   Result Value Ref Range    Extra Tube Hold for add-ons.    CBC Auto Differential    Collection Time: 12/30/24 10:09 AM    Specimen: Blood   Result Value Ref Range    WBC 5.67 3.40 - 10.80 10*3/mm3    RBC 4.18 3.77 - 5.28 10*6/mm3    Hemoglobin 12.3 12.0 - 15.9 g/dL    Hematocrit 36.8 34.0 - 46.6 %    MCV 88.0 79.0 - 97.0 fL    MCH 29.4 26.6 - 33.0 pg    MCHC 33.4 31.5 - 35.7 g/dL    RDW 14.0 12.3 - 15.4 %    RDW-SD 45.1 37.0 - 54.0 fl    MPV 9.4 6.0 - 12.0 fL    Platelets 272 140 - 450 10*3/mm3    Neutrophil % 57.9 42.7 - 76.0 %    Lymphocyte % 26.1 19.6 - 45.3 %    Monocyte % 9.7 5.0 - 12.0 %    Eosinophil % 5.6 0.3 - 6.2 %    Basophil % 0.5 0.0 - 1.5 %    Immature Grans % 0.2 0.0 - 0.5 %    Neutrophils, Absolute 3.28 1.70 - 7.00 10*3/mm3    Lymphocytes, Absolute 1.48 0.70 - 3.10 10*3/mm3    Monocytes, Absolute 0.55 0.10 - 0.90 10*3/mm3    Eosinophils, Absolute 0.32 0.00 - 0.40 10*3/mm3    Basophils, Absolute 0.03 0.00 - 0.20 10*3/mm3    Immature Grans, Absolute 0.01 0.00 - 0.05 10*3/mm3    nRBC 0.0 0.0 - 0.2 /100 WBC   High Sensitivity Troponin T 1Hr    Collection Time: 12/30/24 12:32 PM    Specimen: Blood   Result Value Ref Range    HS Troponin T 9 <14 ng/L    Troponin T Numeric Delta     Urinalysis With Microscopic If Indicated (No Culture) - Urine, Clean Catch    Collection Time: 12/30/24  1:06 PM    Specimen: Urine, Clean Catch   Result Value Ref Range    Color, UA Yellow Yellow, Straw    Appearance, UA Clear Clear    pH, UA 8.5 (H) 5.0 - 8.0    Specific Gravity, UA 1.036 (H) 1.001 - 1.030    Glucose, UA Negative Negative    Ketones, UA Negative Negative    Bilirubin, UA Negative Negative    Blood, UA Trace (A) Negative    Protein, UA Negative Negative    Leuk Esterase, UA Small (1+) (A) Negative    Nitrite, UA Negative  Negative    Urobilinogen, UA 0.2 E.U./dL 0.2 - 1.0 E.U./dL   Urinalysis, Microscopic Only - Urine, Clean Catch    Collection Time: 12/30/24  1:06 PM    Specimen: Urine, Clean Catch   Result Value Ref Range    RBC, UA 0-2 None Seen, 0-2 /HPF    WBC, UA 3-5 (A) None Seen, 0-2 /HPF    Bacteria, UA None Seen None Seen, Trace /HPF    Squamous Epithelial Cells, UA 0-2 None Seen, 0-2 /HPF    Hyaline Casts, UA None Seen 0 - 6 /LPF    Methodology Automated Microscopy        If labs were ordered, I independently reviewed the results and considered them in treating the patient.        RADIOLOGY  MRI Cervical Spine Without Contrast    Result Date: 12/30/2024  MRI CERVICAL SPINE WO CONTRAST Date of Exam: 12/30/2024 11:12 AM EST Indication: Neck pain with possible radiculopathy.  Comparison: CT cervical spine from November 12, 2020 Technique:  Routine multiplanar/multisequence sequence images of the cervical spine were obtained without contrast administration.  Findings: The alignment is anatomic. The craniocervical junction appears intact. The vertebral body heights appear normal. The bone marrow signal is within normal limits. There are mild discogenic changes at C5-6 and C6-7. The partially visualized posterior fossa contents are unremarkable. The spinal cord appears normal. The prevertebral soft tissues are unremarkable. C2-3: Moderate left facet arthropathy. No spinal canal stenosis. No neural foraminal stenosis. C3-4: Severe right and moderate left facet arthropathy. Mild right uncovertebral hypertrophy. No spinal canal stenosis. Mild right neural foraminal stenosis. C4-5: Mild right and moderate left facet arthropathy. Mild bilateral uncovertebral hypertrophy. No spinal canal stenosis. No neural foraminal stenosis. C5-6: Moderate disc osteophyte complex. Mild bilateral facet arthropathy. Moderate right and mild left uncovertebral hypertrophy. Mild spinal canal stenosis. Moderate right and mild left neural foraminal  stenosis. C6-7: Mild disc osteophyte complex. Mild bilateral facet arthropathy. Mild right and moderate left uncovertebral hypertrophy. No spinal canal stenosis. Mild to moderate left neural foraminal stenosis. C7-T1: No spinal canal or neural foraminal stenosis.     Impression: Mild to moderate multilevel degenerative changes of cervical spine as described above. Electronically Signed: John Sharma MD  12/30/2024 12:08 PM EST  Workstation ID: [id number]    MRI Brain Without Contrast    Result Date: 12/30/2024  MRI BRAIN WO CONTRAST Date of Exam: 12/30/2024 11:12 AM EST Indication: Left-sided numbness.  Comparison: CT head from earlier today and MRI brain from November 5, 2024 Technique:  Routine multiplanar/multisequence sequence images of the brain were obtained without contrast administration. Findings: No acute infarction, intracranial hemorrhage, or extra-axial collection is identified. The ventricles appear normal in caliber, with no evidence of mass effect or midline shift. The basal cisterns appear patent. The midline structures appear intact. The globes and orbits appear intact. The intracranial vascular flow-voids appear patent. Degenerative changes appear stable. There is mild peroneal sinus mucosal disease.     Impression: 1.No acute intracranial process identified. 2.Mild paranasal sinus mucosal disease. Electronically Signed: John Sharma MD  12/30/2024 11:55 AM EST  Workstation ID: [id number]    CT Angiogram Head w AI Analysis of LVO    Result Date: 12/30/2024  CT ANGIOGRAM HEAD W AI ANALYSIS OF LVO, CT ANGIOGRAM NECK Date of Exam: 12/30/2024 9:28 AM EST Indication: Neuro Deficit, acute, Stroke suspected Neuro deficit, acute stroke suspected. Comparison: None available. Technique: CTA of the head was performed after the uneventful intravenous administration of 120 cc Isovue-370. Reconstructed coronal and sagittal images were also obtained. In addition, a 3-D volume rendered image was created for  interpretation. Automated exposure control and iterative reconstruction methods were used. Findings: CTA NECK: *Aortic arch: No aneurysm. No significant stenosis or occlusion of the major arch vessels. *Left carotid system: No aneurysm, significant stenosis or occlusion. *Right carotid system: No aneurysm, significant stenosis or occlusion. *Vertebrobasilar system: The vertebral arteries arise from their respective subclavian arteries. No aneurysm, significant stenosis or occlusion. CTA HEAD: *Anterior circulation: No aneurysm, significant stenosis or occlusion. *Posterior circulation: No aneurysm, significant stenosis or occlusion. *Anatomic variants: None of significance. *Venous sinuses: Patent.     1.No hemodynamically significant stenosis, large vessel cut off or aneurysms in the intracranial circulation 2.No hemodynamically significant stenosis, dissection or aneurysms in the extracranial circulation. Electronically Signed: Dawson Lucas MD  12/30/2024 10:19 AM EST  Workstation ID: [id number]    CT Angiogram Neck    Result Date: 12/30/2024  CT ANGIOGRAM HEAD W AI ANALYSIS OF LVO, CT ANGIOGRAM NECK Date of Exam: 12/30/2024 9:28 AM EST Indication: Neuro Deficit, acute, Stroke suspected Neuro deficit, acute stroke suspected. Comparison: None available. Technique: CTA of the head was performed after the uneventful intravenous administration of 120 cc Isovue-370. Reconstructed coronal and sagittal images were also obtained. In addition, a 3-D volume rendered image was created for interpretation. Automated exposure control and iterative reconstruction methods were used. Findings: CTA NECK: *Aortic arch: No aneurysm. No significant stenosis or occlusion of the major arch vessels. *Left carotid system: No aneurysm, significant stenosis or occlusion. *Right carotid system: No aneurysm, significant stenosis or occlusion. *Vertebrobasilar system: The vertebral arteries arise from their respective subclavian arteries. No  aneurysm, significant stenosis or occlusion. CTA HEAD: *Anterior circulation: No aneurysm, significant stenosis or occlusion. *Posterior circulation: No aneurysm, significant stenosis or occlusion. *Anatomic variants: None of significance. *Venous sinuses: Patent.     1.No hemodynamically significant stenosis, large vessel cut off or aneurysms in the intracranial circulation 2.No hemodynamically significant stenosis, dissection or aneurysms in the extracranial circulation. Electronically Signed: Dawson Lucas MD  12/30/2024 10:19 AM EST  Workstation ID: [id number]    CT CEREBRAL PERFUSION WITH & WITHOUT CONTRAST    Result Date: 12/30/2024  CT CEREBRAL PERFUSION W WO CONTRAST Date of Exam: 12/30/2024 9:28 AM EST Indication: Neuro Deficit, acute, Stroke suspected Neuro deficit, acute stroke suspected.  Comparison: Noncontrast head CT 12/30/2024, MRI brain 11/5/2024 Technique: Axial CT images of the brain were obtained prior to and after the administration of 120 cc Isovue-370. Core blood volume, core blood flow, mean transit time, and Tmax images were obtained utilizing the Rapid software protocol. A limited CT angiogram of the head was also performed to measure the blood vessel density. The radiation dose reduction device was turned on for each scan per the ALARA (As Low as Reasonably Achievable) protocol. Findings:  Cerebral blood flow, blood volume, and mean transit time maps are symmetric without large perfusion defect.  CBF<30% volume: 0mL Tmax>6sec volume: 0 mL Mismatch volume: 0mL Mismatch ratio: None         1. No evidence of core infarct nor ischemic brain at risk. Electronically Signed: Dawson Lucas MD  12/30/2024 10:16 AM EST  Workstation ID: [id number]    XR Chest 1 View    Result Date: 12/30/2024  XR CHEST 1 VW Date of Exam: 12/30/2024 9:44 AM EST Indication: Acute Stroke Protocol (onset < 12 hrs) Comparison: Chest radiograph 11/17/2024 Findings: Heart size within normal limits. Negative for lobar  consolidation, edema, effusion or pneumothorax. Osseous structures unremarkable.     Impression: No active pulmonary process. Electronically Signed: Roberth Salazar MD  12/30/2024 10:01 AM EST  Workstation ID: [id number]    CT Head Without Contrast Stroke Protocol    Result Date: 12/30/2024  CT HEAD WO CONTRAST STROKE PROTOCOL Date of Exam: 12/30/2024 9:07 AM EST Indication: Neuro deficit, acute, stroke suspected Neuro Deficit, acute, Stroke suspected. Comparison: Brain MRI 11/5/2024, head CT 12/17/2020 Technique: Axial CT images were obtained of the head without contrast administration.  Reconstructed coronal images were also obtained. Automated exposure control and iterative construction methods were used. Scan Time: 9:10 a.m. 12/30/2024 Results discussed with stroke navigator by Dr. Dyllan Garcia in person at the CT scanner at 9:15 a.m 12/30/2024. Findings: There is some streak artifact/beam Weber associated with left earrings. Accounting for this, no acute intracranial hemorrhage. No acute large territory infarct. There are mild scattered subcortical and periventricular white matter hypodensities which are nonspecific and can be seen in the setting of chronic small vessel ischemic change. No extra-axial collections. No midline shift or herniation. Normal size and configuration of the ventricles. Unremarkable appearance of the orbits. There is mucosal thickening at the floor of the right maxillary sinus. No acute or suspicious bony findings.     Impression: No acute intracranial findings. Chronic and senescent changes as above. Electronically Signed: Dyllan Garcia MD  12/30/2024 9:34 AM EST  Workstation ID: [id number]     I ordered and independently reviewed the above noted radiographic studies.      PROCEDURES    Procedures    ECG 12 Lead Tachycardia   Preliminary Result   Test Reason : Tachycardia   Blood Pressure :   */*   mmHG   Vent. Rate : 102 BPM     Atrial Rate : 102 BPM      P-R Int : 134 ms           QRS Dur :  80 ms       QT Int : 364 ms       P-R-T Axes :  40  -6  33 degrees     QTcB Int : 474 ms      Sinus tachycardia   Possible Inferior infarct , age undetermined   Cannot rule out Anterior infarct (cited on or before 14-Dec-2018)   Abnormal ECG   When compared with ECG of 18-Nov-2024 11:36,   Serial changes of Anterior infarct present      Referred By: EDMD           Confirmed By:       ECG 12 Lead ED Triage Standing Order; Acute Stroke (Onset <24 hrs)    (Results Pending)       MEDICATIONS GIVEN IN ER    Medications   sodium chloride 0.9 % flush 10 mL (has no administration in time range)   aspirin chewable tablet 81 mg (81 mg Oral Given 12/30/24 1044)     Or   aspirin suppository 300 mg ( Rectal Not Given:  See Alt 12/30/24 1044)   magnesium sulfate in D5W 1g/100mL (PREMIX) (0 g Intravenous Stopped 12/30/24 1306)   sodium chloride 0.9 % bolus 1,000 mL (0 mL Intravenous Stopped 12/30/24 1235)   prochlorperazine (COMPAZINE) injection 10 mg (10 mg Intravenous Given 12/30/24 1005)   diphenhydrAMINE (BENADRYL) injection 25 mg (25 mg Intravenous Given 12/30/24 1005)   iopamidol (ISOVUE-370) 76 % injection 150 mL (120 mL Intravenous Given 12/30/24 0951)   potassium chloride (MICRO-K/KLOR-CON) CR capsule (20 mEq Oral Given 12/30/24 1021)         MEDICAL DECISION MAKING, PROGRESS, and CONSULTS    All labs, if obtained, have been independently reviewed by me.  All radiology studies, if obtained, have been reviewed by me and the radiologist dictating the report.  All EKG's, if obtained, have been independently viewed and interpreted by me/my attending physician.      Discussion below represents my analysis of pertinent findings related to patient's condition, differential diagnosis, treatment plan and final disposition.  Patient is 44-year-old female who presented to ER for evaluation of generalized unwellness, left arm paresthesia blurry vision since 45 minutes prior to arrival.  Furthermore she had hematuria few  days ago.  On physical exam she was anxious appearing, tachycardic, hypertensive, mild decreased strength in  to left hand was noted on initial exam.  Stroke team notified.  Proceeded with stroke evaluation.  MRI of the brain, CT angiogram neck and head were negative for stenosis, large vessel occlusion or aneurysms.  MRI of cervical spine showed mild to moderate multilevel degenerative changes of cervical spine.  Her cardiac workup was negative.  Her blood pressure improved.  Left arm paresthesia most likely due to cervical radiculopathy.  I reviewed patient's echo from 11/19/2024, normal left ventricular systolic function, EF 60.7%, left ventricular diastolic function is normal, right ventricle, left and right atrium.  No indication for admission today.  Advised patient to follow-up with back specialist, referral for neurosurgery provided on discharge.  Discussed return precautions for any new or concerning symptoms.  Patient was understanding.      Differential diagnosis:    Cervical radiculopathy, CVA, acute coronary syndrome, electrolyte disbalance      Additional sources:    - Discussed/ obtained information from independent historians:      - External (non-ED) record review:    Echocardiogram Findings    Left Ventricle Left ventricular systolic function is normal. Calculated left ventricular EF = 60.7% Left ventricular ejection fraction appears to be 61 - 65%.     Normal left ventricular cavity size and wall thickness noted. All left ventricular wall segments contract normally. Left ventricular diastolic function was normal.   Right Ventricle Normal right ventricular cavity size, wall thickness, systolic function and septal motion noted.   Left Atrium Normal left atrial size and volume noted.   Right Atrium Normal right atrial cavity size noted.   Aortic Valve The aortic valve is structurally normal with no stenosis present. The aortic valve appears trileaflet. No significant aortic valve regurgitation is  present.   Mitral Valve The mitral valve is grossly normal in structure. Trace mitral valve regurgitation is present. No significant mitral valve stenosis is present.   Tricuspid Valve No significant tricuspid valve regurgitation or significant stenosis present. The tricuspid valve is grossly normal in structure. Insufficient TR velocity profile to estimate the right ventricular systolic pressure.   Pulmonic Valve The pulmonic valve is structurally normal with no significant stenosis present. There is trace to mild pulmonic valve regurgitation present.   Greater Vessels No dilation of the aortic root is present.   Pericardium The pericardium is normal. There is no evidence of pericardial effusion. .       - Chronic or social conditions impacting care: Hypertension    - Shared decision making: Patient      Orders placed during this visit:  Orders Placed This Encounter   Procedures    CT Head Without Contrast Stroke Protocol    CT Angiogram Head w AI Analysis of LVO    CT Angiogram Neck    CT CEREBRAL PERFUSION WITH & WITHOUT CONTRAST    XR Chest 1 View    MRI Brain Without Contrast    MRI Cervical Spine Without Contrast    Ogema Draw    Protime-INR    aPTT    AST    ALT    CBC Auto Differential    Urinalysis With Microscopic If Indicated (No Culture) - Urine, Clean Catch    High Sensitivity Troponin T    proBNP    High Sensitivity Troponin T 1Hr    Urinalysis, Microscopic Only - Urine, Clean Catch    Ambulatory Referral to Neurosurgery    NPO Diet NPO Type: Strict NPO    Initiate Code Stroke    Perform NIH Stroke Scale    Measure Actual Weight    Head of Bed 30 Degrees or Less    Undress and Gown    Continuous Pulse Oximetry    Vital Signs    Neuro Checks    Notify Provider SBP <80 or >200    Notify Provider for SBP > 140 if Hemorrhagic Stroke    No Hypotonic Fluids    Nursing Dysphagia Screening (Complete Prior to Giving anything PO)    RN to Place Order SLP Consult (IF swallow screen failed) - Eval & Treat  Choosing Reason of RN Dysphagia Screen Failed    Vital Signs Recheck    Nursing Dysphagia Screening (Complete Prior to Giving Anything By Mouth)    RN to Place Order SLP Consult - Eval & Treat Choosing Reason of RN Dysphagia Screen Failed    Nurse to Call MD or Nutrition Services for Diet if Patient Passes Dysphagia Screen    Activity As Tolerated    Manual Blood Pressure - Notify provider of result    Inpatient Neurology Consult Stroke    Oxygen Therapy- Nasal Cannula; Titrate 1-6 LPM Per SpO2; 90 - 95%    POC CHEM 8    ECG 12 Lead Tachycardia    ECG 12 Lead ED Triage Standing Order; Acute Stroke (Onset <24 hrs)    Insert Large-Bore Peripheral IV - Right AC Preferred    CBC & Differential    Green Top (Gel)    Lavender Top    Gold [facility] - SST    Gray Top    Light Blue Top         Additional orders considered but not ordered:  EtOH    ED Course:    Consultants:      ED Course as of 12/30/24 1333   Mon Dec 30, 2024   0902 Spoke with Brionna stroke coordinator, will initiate stroke protocol [IR]   0958 Awaiting imaging.  Stroke coordinator Brionna updated me, stating neurological exam was negative.  She will order MRI of head and cervical spine.  Symptoms could be due to cervical radiculopathy. [IR]   1234 Can be discharged from stroke team standpoint [IR]   1234 Awaiting repeat troponin [IR]   1320 Negative delta troponins; I spoke with the patient, discussed results of MRI of the brain, CT neck and head, no acute abnormality.  MRI of cervical spine shows mild to moderate multilevel degenerative changes.  Paresthesia and pain to the left arm most likely due to cervical radiculopathy.  She will be discharged home.  She can follow-up with back specialist. [IR]      ED Course User Index  [IR] Sakina Fonseca, JOVANNY              Shared Decision Making:  After my consideration of clinical presentation and any laboratory/radiology studies obtained, I discussed the findings with the patient/patient representative who is in  agreement with the treatment plan and the final disposition.   Risks and benefits of discharge and/or observation/admission were discussed.       AS OF 13:33 EST VITALS:    BP - 145/88  HR - 74  TEMP - 97.9 °F (36.6 °C) (Oral)  O2 SATS - 99%                  DIAGNOSIS  Final diagnoses:   Hypertensive urgency   Paresthesia and pain of left extremity   DDD (degenerative disc disease), cervical   Elevated liver enzymes         DISPOSITION  DISCHARGE    Patient discharged in stable condition.    Reviewed implications of results, diagnosis, meds, responsibility to follow up, warning signs and symptoms of possible worsening, potential complications and reasons to return to ER.    Patient/Family voiced understanding of above instructions.    Discussed plan for discharge, as there is no emergent indication for admission.  Pt/family is agreeable and understands need for follow up and possible repeat testing.  Pt/family is aware that discharge does not mean that nothing is wrong but that it indicates no emergency is currently present that requires admission and they must continue care with follow-up as given below or with a physician of their choice.     FOLLOW-UP  Tracie Meraz APRN  [address]  [place] [address] [phone]               Medication List      No changes were made to your prescriptions during this visit.            Please note that portions of this document were completed with voice recognition software.     JOVANNY Mitchell   12/30/24   13:33 EST        Sakina Fonseca APRN  12/30/24 [id number]

## 2024-12-30 NOTE — CONSULTS
"Stroke Consult Note    Patient Name: Bebe Rincon   MRN: [id number]  Age: 44 y.o.  Sex: female  : 1980    Primary Care Physician: Tracie Meraz APRN  Referring Physician: JOVANNY Anaya    TIME STROKE TEAM CALLED: 901 EST     TIME PATIENT SEEN: 911 EST    Handedness: Right  Race:     Chief Complaint/Reason for Consultation: Neck pain, left arm and leg numbness, blurry vision, dizziness    HPI: Bebe Rincon is a 44-year-old female with pertinent medical history of HTN, migraine, bipolar 1 disorder, remote alcohol abuse, remote tobacco abuse presents to [facility] emergency department via private vehicle for further evaluation of neck pain, left shoulder pain, left upper and lower extremity numbness, dizziness (\"feeling like she is on a boat\"), and headache.  She tells me that this began 1 hour prior to arrival.  She woke up around 6 AM this morning and was completely normal.  She denies any neck injury including chiropractor, excess hyperextension, MVC, etc.  She tells me that she has also been having blood in her urine.  Patient's exam is inconsistent.  CT head shows no acute intracranial abnormalities.  There was a delay in getting contrasted scans as there was difficulty establishing IV access. CTA head and neck and perfusion show no flow-limiting stenosis or LVO.  She will be admitted to the hospitalist service for further evaluation.    Case discussed with Dr. Story (vascular neurologist) who recommends not giving TNK in the setting of low NIH.      Last Known Normal Date/Time: 0800 EST     Review of Systems   Eyes:  Positive for visual disturbance.   Cardiovascular:  Negative for chest pain.   Neurological:  Positive for dizziness, weakness, light-headedness, numbness and headaches.   All other systems reviewed and are negative.     Past Medical History:   Diagnosis Date    ADHD (attention deficit hyperactivity disorder)     Asthma     [place]ado " esophagus 2024    Bipolar 1 disorder     Bipolar 1 disorder 2024    Hemorrhage     Herpes     History of alcohol abuse 2024    Sober since 2020      Hypertension     Migraine     Resistant hypertension 2024    Urinary tract infection     Weight gain, abnormal 2024    Reports 30 lbs increase unintentional over 4 months       Past Surgical History:   Procedure Laterality Date    CHOLECYSTECTOMY      COLONOSCOPY      unknown    ENDOSCOPY N/A 2024    Procedure: ESOPHAGOGASTRODUODENOSCOPY;  Surgeon: Shadi Frost MD;  Location: [facility] ENDOSCOPY;  Service: Gastroenterology;  Laterality: N/A;    HYSTERECTOMY      OOPHORECTOMY  2015    ROTATOR CUFF REPAIR      ULNAR NERVE REPAIR      UPPER GASTROINTESTINAL ENDOSCOPY  2019    Dr. Arroyo     Family History   Problem Relation Age of Onset    Arthritis Mother     Hypertension Mother     Thyroid disease Mother     Thyroid cancer Mother     Heart disease Father     Hypertension Father     Hyperlipidemia Father     Arrhythmia Father     Heart attack Father     Hypertension Brother     No Known Problems Daughter     No Known Problems Daughter     No Known Problems Daughter     Heart disease Paternal Uncle     Colon cancer Maternal Grandmother     Heart disease Maternal Grandmother     Breast cancer Paternal Grandmother     Diabetes Paternal Grandmother     Colon cancer Paternal Grandmother     Early death Paternal Grandfather      Social History     Socioeconomic History    Marital status:    Tobacco Use    Smoking status: Former     Current packs/day: 0.00     Types: Cigarettes     Start date: 1998     Quit date: 2019     Years since quittin.0     Passive exposure: Past    Smokeless tobacco: Never   Vaping Use    Vaping status: Never Used   Substance and Sexual Activity    Alcohol use: Not Currently     Comment: sober since 2020    Drug use: Not Currently     Comment: 2 years sober    Sexual activity: Not  Currently     Partners: Male     Birth control/protection: Hysterectomy, Surgical     Allergies   Allergen Reactions    Lamictal [Lamotrigine] Anaphylaxis    Amlodipine Irritability    Rocephin [Ceftriaxone] Hives    Tetracyclines & Related Other (See Comments)     Low h&h     Prior to Admission medications    Medication Sig Start Date End Date Taking? Authorizing Provider   buPROPion XL (WELLBUTRIN XL) 150 MG 24 hr tablet Take 1 tablet by mouth Daily.    Michael Sheppard MD   buPROPion XL (WELLBUTRIN XL) 300 MG 24 hr tablet Take 1 tablet by mouth Daily.    Michael Sheppard MD   EPINEPHrine (EPIPEN) 0.3 MG/0.3ML solution auto-injector injection INJECT 1 PEN IN THE MUSCLE ONE TIME AS DIRECTED IF STUNG BY WASP THEN GO TO EMERGENCY ROOM 8/27/24   Michael Sheppard MD   FLUoxetine (PROzac) 40 MG capsule Take 1 capsule by mouth Daily.    Michael Sheppard MD   Fluticasone-Salmeterol (ADVAIR/WIXELA) 100-50 MCG/ACT DISKUS Inhale 1 puff 2 (Two) Times a Day. 9/30/24   Michael Sheppard MD   hydrALAZINE (APRESOLINE) 50 MG tablet Take 1 tablet by mouth Every 8 (Eight) Hours As Needed (BP >180/100 mm Hg). 11/12/24   Shadi Lester III, MD   hydrOXYzine (ATARAX) 25 MG tablet  10/3/22   Michael Sheppard MD   naloxone (NARCAN) 4 MG/0.1ML nasal spray  9/29/22   Michael Sheppard MD   nebivolol (Bystolic) 20 MG tablet Take 1 tablet by mouth Daily. 11/12/24   Shadi Lester III, MD   PROAIR  (90 Base) MCG/ACT inhaler Inhale 2 puffs Every 6 (Six) Hours As Needed. 3/7/18   Michael Sheppard MD   promethazine (PHENERGAN) 25 MG tablet Take 1 tablet by mouth Every 6 (Six) Hours As Needed for Nausea or Vomiting. 10/15/22   Syed Marx PA   rOPINIRole (REQUIP) 1 MG tablet Take 1 tablet by mouth Every Night. 10/24/24   Michael Sheppard MD   sacubitril-valsartan (Entresto)  MG tablet Take 1 tablet by mouth 2 (Two) Times a Day. 11/25/24   Shadi Lester III, MD   topiramate (TOPAMAX) 200  MG tablet  10/13/22   Michael Sheppard MD   traZODone (DESYREL) 100 MG tablet Take 1 tablet by mouth As Needed. 10/21/24   Michael Sheppard MD   traZODone (DESYREL) 150 MG tablet Take 1 tablet by mouth As Needed. 10/22/24   Michael Sheppard MD   triamterene-hydrochlorothiazide (MAXZIDE-25) 37.5-25 MG per tablet Take 1 tablet by mouth Daily. 12/3/24   Shadi Lester III, MD   valACYclovir (VALTREX) 1000 MG tablet Take 1 tablet by mouth Daily. 8/2/21   Michael Sheppard MD   ziprasidone (Geodon) 40 MG capsule Take 1 capsule by mouth 2 (Two) Times a Day With Meals.    Michael Sheppard MD         Temp:  [97.9 °F (36.6 °C)] 97.9 °F (36.6 °C)  Heart Rate:  [120] 120  Resp:  [20] 20  BP: (177-193)/(118-120) 177/120  Neurological Exam  Mental Status  Awake, alert and oriented to person, place and time. Oriented to person, place, time and situation. Oriented to person, place, and time. Memory is normal. Recent and remote memory are intact. Speech is normal. Language is fluent with no aphasia. Attention and concentration are normal.    Cranial Nerves  CN II: Visual fields full to confrontation.  CN III, IV, VI: Extraocular movements intact bilaterally. Pupils equal round and reactive to light bilaterally.  CN V: Facial sensation is normal.  CN VII: Full and symmetric facial movement.  CN VIII: Hearing appears intact.  CN XII: Tongue midline without atrophy or fasciculations.    Motor  Normal muscle bulk throughout. Normal muscle tone. Strength is 5/5 throughout all four extremities.  LLE 4/5, positive Urrutia exam.    Sensory  Light touch abnormality: Reported decreased sensation on left upper and lower extremity.  No right-sided hemispatial neglect. No left-sided hemispatial neglect.    Coordination  Right: Finger-to-nose normal. Heel-to-shin normal.Left: Finger-to-nose normal. Heel-to-shin normal.    Gait   Normal gait.Casual gait is normal including stance, stride, and arm swing. Normal  gait.      Physical Exam  Vitals and nursing note reviewed.   Constitutional:       General: She is not in acute distress.     Appearance: Normal appearance. She is not ill-appearing or toxic-appearing.   HENT:      Head: Normocephalic and atraumatic.      Mouth/Throat:      Mouth: Mucous membranes are moist.   Eyes:      Extraocular Movements: Extraocular movements intact.      Pupils: Pupils are equal, round, and reactive to light.   Cardiovascular:      Rate and Rhythm: Normal rate and regular rhythm.      Pulses: Normal pulses.      Heart sounds: Normal heart sounds.   Pulmonary:      Effort: Pulmonary effort is normal. No respiratory distress.      Breath sounds: Normal breath sounds.   Musculoskeletal:      Cervical back: Normal range of motion and neck supple. No rigidity or tenderness.   Skin:     General: Skin is warm and dry.      Capillary Refill: Capillary refill takes less than 2 seconds.   Neurological:      Mental Status: She is oriented to person, place, and time. Mental status is at baseline.      Cranial Nerves: No cranial nerve deficit.      Sensory: Sensory deficit present.      Motor: Motor strength is normal.     Coordination: Coordination normal.      Gait: Gait is intact. Gait normal.   Psychiatric:         Attention and Perception: Attention normal.         Mood and Affect: Mood is anxious.         Speech: Speech normal.         Behavior: Behavior normal. Behavior is cooperative.         Cognition and Memory: Cognition and memory normal.         Judgment: Judgment normal.         Acute Stroke Data    Thrombolytic Inclusion / Exclusion Criteria    Time: 09:18 EST  Person Administering Scale: JOVANNY Way    Inclusion Criteria  [x]   18 years of age or greater   []   Onset of symptoms < 4.5 hours before beginning treatment (stroke onset = time patient was last seen well or without symptoms).   []   Diagnosis of acute ischemic stroke causing measurable disabling deficit (Complete  Hemianopia, Any Aphasia, Visual or Sensory Extinction, Any weakness limiting sustained effort against gravity)   []   Any remaining deficit considered potentially disabling in view of patient and practitioner   Exclusion criteria (Do not proceed with Alteplase if any are checked under exclusion criteria)  []   Onset unknown or GREATER than 4.5 hours   []   ICH on CT/MRI   []   CT demonstrates hypodensity representing acute or subacute infarct   []   Significant head trauma or prior stroke in the previous 3 months   []   Symptoms suggestive of subarachnoid hemorrhage   []   History of un-ruptured intracranial aneurysm GREATER than 10 mm   []   Recent intracranial or intraspinal surgery within the last 3 months   []   Arterial puncture at a non-compressible site in the previous 7 days   [x]   Active internal bleeding   []   Acute bleeding tendency   []   Platelet count LESS than 100,000 for known hematological diseases such as leukemia, thrombocytopenia or chronic cirrhosis   []   Current use of anticoagulant with INR GREATER than 1.7 or PT GREATER than 15 seconds, aPTT GREATER than 40 seconds   []   Heparin received within 48 hours, resulting in abnormally elevated aPTT GREATER than upper limit of normal   []   Current use of direct thrombin inhibitors or direct factor Xa inhibitors in the past 48 hours   []   Elevated blood pressure refractory to treatment (systolic GREATER than 185 mm/Hg or diastolic  GREATER than 110 mm/Hg   []   Suspected infective endocarditis and aortic arch dissection   []   Current use of therapeutic treatment dose of low-molecular-weight heparin (LMWH) within the previous 24 hours   [x]   Structural GI malignancy or bleed   Relative exclusion for all patients  [x]   Only minor non-disabling symptoms   []   Pregnancy   []   Seizure at onset with postictal residual neurological impairments   []   Major surgery or previous trauma within past 14 days   []   History of previous spontaneous ICH,  intracranial neoplasm, or AV malformation   []   Postpartum (within previous 14 days)   []   Recent GI or urinary tract hemorrhage (within previous 21 days)   []   Recent acute MI (within previous 3 months)   []   History of un-ruptured intracranial aneurysm LESS than 10 mm   []   History of ruptured intracranial aneurysm   []   Blood glucose LESS than 50 mg/dL (2.7 mmol/L)   []   Dural puncture within the last 7 days   []   Known GREATER than 10 cerebral microbleeds   Additional exclusions for patients with symptoms onset between 3 and 4.5 hours.  []   Age > 80.   []   On any anticoagulants regardless of INR  >>> Warfarin (Coumadin), Heparin, Enoxaparin (Lovenox), fondaparinux (Arixtra), bivalirudin (Angiomax), Argatroban, dabigatran (Pradaxa), rivaroxaban (Xarelto), or apixaban (Eliquis)   []   Severe stroke (NIHSS > 25).   []   History of BOTH diabetes and previous ischemic stroke.   []   The risks and benefits have been discussed with the patient or family related to the administration of IV thrombolytic therapy for stroke symptoms.   []   I have discussed and reviewed the patient's case and imaging with the attending prior to IV thrombolytic therapy.   N/A Time IV thrombolytic administered       Hospital Meds:  Scheduled-    Infusions-     PRNs-   sodium chloride    Functional Status Prior to Current Stroke/[place] Score: 0    NIH Stroke Scale  Time: 09:18 EST  Person Administering Scale: JOVANNY Way  Interval: baseline  1a. Level of Consciousness: 0-->Alert, keenly responsive  1b. LOC Questions: 0-->Answers both questions correctly  1c. LOC Commands: 0-->Performs both tasks correctly  2. Best Gaze: 0-->Normal  3. Visual: 0-->No visual loss  4. Facial Palsy: 0-->Normal symmetrical movements  5a. Motor Arm, Left: 0-->No drift, limb holds 90 (or 45) degrees for full 10 secs  5b. Motor Arm, Right: 0-->No drift, limb holds 90 (or 45) degrees for full 10 secs  6a. Motor Leg, Left: 0-->No drift, leg holds 30  degree position for full 5 secs  6b. Motor Leg, Right: 0-->No drift, leg holds 30 degree position for full 5 secs  7. Limb Ataxia: 0-->Absent  8. Sensory: 1-->Mild-to-moderate sensory loss, patient feels pinprick is less sharp or is dull on the affected side, or there is a loss of superficial pain with pinprick, but patient is aware of being touched  9. Best Language: 0-->No aphasia, normal  10. Dysarthria: 0-->Normal  11. Extinction and Inattention (formerly Neglect): 0-->No abnormality    Total (NIH Stroke Scale): 1      Results Reviewed:  I have personally reviewed current lab, radiology, and data and agree with results.    Results for orders placed during the hospital encounter of 11/17/24    Adult Transthoracic Echo Complete W/ Cont if Necessary Per Protocol    Interpretation Summary    Left ventricular systolic function is normal. Calculated left ventricular EF = 60.7% Left ventricular ejection fraction appears to be 61 - 65%.    Left ventricular diastolic function was normal.     CT Angiogram Head w AI Analysis of LVO    Result Date: 12/30/2024  1.No hemodynamically significant stenosis, large vessel cut off or aneurysms in the intracranial circulation 2.No hemodynamically significant stenosis, dissection or aneurysms in the extracranial circulation. Electronically Signed: Dawson Lucas MD  12/30/2024 10:19 AM EST  Workstation ID: [id number]    CT Angiogram Neck    Result Date: 12/30/2024  1.No hemodynamically significant stenosis, large vessel cut off or aneurysms in the intracranial circulation 2.No hemodynamically significant stenosis, dissection or aneurysms in the extracranial circulation. Electronically Signed: Dawson Lucas MD  12/30/2024 10:19 AM EST  Workstation ID: [id number]    CT CEREBRAL PERFUSION WITH & WITHOUT CONTRAST    Result Date: 12/30/2024   1. No evidence of core infarct nor ischemic brain at risk. Electronically Signed: Dawson Lucas MD  12/30/2024 10:16 AM EST  Workstation ID:  [id number]    CT Head Without Contrast Stroke Protocol    Result Date: 12/30/2024  Impression: No acute intracranial findings. Chronic and senescent changes as above. Electronically Signed: Dyllan Garcia MD  12/30/2024 9:34 AM EST  Workstation ID: [id number]   WBC   Date Value Ref Range Status   12/30/2024 5.67 3.40 - 10.80 10*3/mm3 Final     RBC   Date Value Ref Range Status   12/30/2024 4.18 3.77 - 5.28 10*6/mm3 Final     Hemoglobin   Date Value Ref Range Status   12/30/2024 12.3 12.0 - 15.9 g/dL Final   12/30/2024 13.6 12.0 - 17.0 g/dL Final     Comment:     Serial Number: [id number]:  [id number]     Hematocrit   Date Value Ref Range Status   12/30/2024 36.8 34.0 - 46.6 % Final   12/30/2024 40 38 - 51 % Final     MCV   Date Value Ref Range Status   12/30/2024 88.0 79.0 - 97.0 fL Final     MCH   Date Value Ref Range Status   12/30/2024 29.4 26.6 - 33.0 pg Final     MCHC   Date Value Ref Range Status   12/30/2024 33.4 31.5 - 35.7 g/dL Final     RDW   Date Value Ref Range Status   12/30/2024 14.0 12.3 - 15.4 % Final     RDW-SD   Date Value Ref Range Status   12/30/2024 45.1 37.0 - 54.0 fl Final     MPV   Date Value Ref Range Status   12/30/2024 9.4 6.0 - 12.0 fL Final     Platelets   Date Value Ref Range Status   12/30/2024 272 140 - 450 10*3/mm3 Final     Neutrophil %   Date Value Ref Range Status   12/30/2024 57.9 42.7 - 76.0 % Final     Lymphocyte %   Date Value Ref Range Status   12/30/2024 26.1 19.6 - 45.3 % Final     Monocyte %   Date Value Ref Range Status   12/30/2024 9.7 5.0 - 12.0 % Final     Eosinophil %   Date Value Ref Range Status   12/30/2024 5.6 0.3 - 6.2 % Final     Basophil %   Date Value Ref Range Status   12/30/2024 0.5 0.0 - 1.5 % Final     Immature Grans %   Date Value Ref Range Status   12/30/2024 0.2 0.0 - 0.5 % Final     Neutrophils, Absolute   Date Value Ref Range Status   12/30/2024 3.28 1.70 - 7.00 10*3/mm3 Final     Lymphocytes, Absolute   Date Value Ref Range Status   12/30/2024 1.48 0.70  "- 3.10 10*3/mm3 Final     Monocytes, Absolute   Date Value Ref Range Status   12/30/2024 0.55 0.10 - 0.90 10*3/mm3 Final     Eosinophils, Absolute   Date Value Ref Range Status   12/30/2024 0.32 0.00 - 0.40 10*3/mm3 Final     Basophils, Absolute   Date Value Ref Range Status   12/30/2024 0.03 0.00 - 0.20 10*3/mm3 Final     Immature Grans, Absolute   Date Value Ref Range Status   12/30/2024 0.01 0.00 - 0.05 10*3/mm3 Final     nRBC   Date Value Ref Range Status   12/30/2024 0.0 0.0 - 0.2 /100 WBC Final     Lab Results   Component Value Date    GLUCOSE 101 (H) 12/02/2024    BUN 17 12/02/2024    CREATININE 1.20 12/30/2024     12/02/2024    K 3.9 12/02/2024     12/02/2024    CALCIUM 9.1 12/02/2024    PROTEINTOT 8.4 11/17/2024    ALBUMIN 4.7 11/17/2024    ALT 65 (H) 12/30/2024    AST 74 (H) 12/30/2024    ALKPHOS 119 (H) 11/17/2024    BILITOT 0.5 11/17/2024    GLOB 3.7 11/17/2024    AGRATIO 1.3 11/17/2024    BCR 12.9 12/02/2024    ANIONGAP 13.4 12/02/2024    EGFR 57.7 (L) 12/30/2024 11/18/2024 hemoglobin A1c 5.10  3/8/2018     Assessment/Plan:    This is a 44-year-old female with pertinent medical history of HTN, migraine, bipolar 1 disorder, remote alcohol abuse, remote tobacco abuse presents to [facility] emergency department via private vehicle for further evaluation of neck pain, left shoulder pain, left upper and lower extremity numbness, dizziness (\"feeling like she is on a boat\"), and headache.  She tells me that this began 1 hour prior to arrival.  Patient's exam is inconsistent.  CT head shows no acute intracranial abnormalities.  CTA head and neck and perfusion show no flow-limiting stenosis or LVO.  She will be admitted to the hospitalist service for further evaluation.    Antiplatelet PTA: None   Anticoagulant PTA: None        Left-sided numbness, dizziness, headache  Differential diagnosis includes CVA/TIA vs atypical migraine vs peripheral source  -TIA/CVA order set " without thrombolytic therapy has been initiated  -NPO until bedside nursing dysphagia screen completed  -MRI brain pending  -MRI cervical spine pending  -TTE completed on 11/18/2024 no need to repeat, EF 60.7%, normal atrial size and volume  -A1c and lipid panel in AM  -Meds: Aspirin 81 mg  -Activity as tolerated, fall risk precautions  -PT/OT/SLP evaluation    2.  Essential hypertension  -Allow autoregulation of blood pressure for adequate cerebral blood flow, goal SBP <220    3.  Hyperlipidemia  -Lipid panel in AM  -Atorvastatin 80mg nightly    Plan of care was discussed with JOVANNY Anaya (emergency), Dr. Story (vascular neurology), and patient.  Stroke neurology will continue to follow.  Please call with any questions or concerns.  Thank you for this consult.      JOVANNY Way  December 30, 2024  09:18 EST     Addendum 1227  MRI brain was negative for acute ischemic stroke.  Patient symptoms have improved since receiving migraine cocktail and lowering of blood pressure.  She is asking nursing if everything is negative if it is okay if she goes home.  From a stroke neurology standpoint patient is okay to be discharged from the emergency department when cleared by primary team.  Discussed plan of care to Dr. Martinez (specialist) and JOVANNY Anaya (emergency) who are also in agreements that patient is okay to be discharged.

## 2025-01-05 LAB
QT INTERVAL: 364 MS
QTC INTERVAL: 474 MS

## 2025-01-15 ENCOUNTER — OFFICE VISIT (OUTPATIENT)
Dept: CARDIOLOGY | Facility: CLINIC | Age: 45
End: 2025-01-15
Payer: MEDICARE

## 2025-01-15 VITALS
BODY MASS INDEX: 31.09 KG/M2 | HEIGHT: 70 IN | SYSTOLIC BLOOD PRESSURE: 110 MMHG | HEART RATE: 78 BPM | DIASTOLIC BLOOD PRESSURE: 68 MMHG | OXYGEN SATURATION: 99 % | WEIGHT: 217.2 LBS

## 2025-01-15 DIAGNOSIS — I1A.0 RESISTANT HYPERTENSION: Primary | Chronic | ICD-10-CM

## 2025-01-15 RX ORDER — VONOPRAZAN FUMARATE 26.72 MG/1
1 TABLET ORAL DAILY
COMMUNITY
Start: 2025-01-08

## 2025-01-15 NOTE — PROGRESS NOTES
[facility] Cardiology  Office visit  Bebe Rincon  1980  [phone]  There is no work phone number on file.    VISIT DATE:  1/15/2025    PCP: Tracie Meraz APRN  [address]  [place] [address]    CC:  Chief Complaint   Patient presents with    Resistant hypertension       Previous cardiac studies and procedures:  12/2020   exercise myocardial perfusion imaging  Patient denied chest pain with exercise  Expected exercise time 9:10 Actual time 5:31 TIARRA +35. Patient requested to stop secondary to dizziness.  Patient was hypertensive at baseline with /100 mmHg. Peak blood pressure 200/120 mmHg.  No ischemic EKG changes with exercise  Myocardial perfusion imaging indicates a normal myocardial perfusion study with no evidence of ischemia.  Left ventricular ejection fraction is normal. (Calculated EF = 54%).  No significant coronary artery calcification  Impression: No EKG or perfusion evidence of ischemia. Patient does have significant resting hypertension and hypertensive response to exercise.  TTE  Calculated left ventricular EF = 55% Left ventricular systolic function is normal.  Left ventricular wall thickness is consistent with mild concentric hypertrophy  Cardiac valves appear structurally and functionally normal    ASSESSMENT:   Diagnosis Plan   1. Resistant hypertension            PLAN:  Resistant hypertension: Goal less than 130/80 mmHg.  Renovascular duplex negative.    -Continue triamterene hydrochlorothiazide 75-50  -Continue Entresto  mg p.o. twice daily.  -continue Bystolic 20 mg p.o. daily.  Continue hydralazine to 50 mg p.o. every 8 hours for symptomatic hypertension.    -Developed edema and fatigue on calcium channel blockade.    Attention deficit disorder/bipolar: Clear to reinitiate therapy with either Adderall or Vyvanse.  Would recommend starting single agent therapy and then adding the second agent after 2 weeks if blood  "pressure remaining controlled.  Per the discretion of behavioral health provider to determine order of reinitiation of therapy.    subjective  Initial evaluation: Blood pressures not running less than 130/80 mmHg.  Denies chest pain, palpitations or dyspnea.    Initial evaluation: 44-year-old female with longstanding history of resistant hypertension. Was previously relatively well-controlled prior to the previous 3 to 4 weeks when she had an unexplained worsening in her baseline blood pressure. She has had frequent blood pressures greater than 180/100 mmHg with associated fatigue and headache. Reviewed recent ED evaluation. She appears compliant with medical therapy. Non-smoker. No significant alcohol or caffeine use. Clonidine has not been effective as a as needed medication to lower blood pressure. GI intolerance to spironolactone. Previously developed hypokalemia with thiazide diuretic but appear to help control her blood pressure well.     PHYSICAL EXAMINATION:  Vitals:    01/15/25 0928   BP: 110/68   BP Location: Right arm   Patient Position: Sitting   Cuff Size: Adult   Pulse: 78   SpO2: 99%   Weight: 98.5 kg (217 lb 3.2 oz)   Height: 177.8 cm (70\")       General Appearance:    Alert, cooperative, no distress, appears stated age   Head:    Normocephalic, without obvious abnormality, atraumatic   Eyes:    conjunctiva/corneas clear   Nose:   Nares normal, septum midline, mucosa normal, no drainage   Throat:   Lips, teeth and gums normal   Neck:   Supple, symmetrical, trachea midline, no carotid    bruit or JVD   Lungs:     Clear to auscultation bilaterally, respirations unlabored   Chest Wall:    No tenderness or deformity    Heart:    Regular rate and rhythm, S1 and S2 normal, no murmur, rub   or gallop, normal carotid impulse bilaterally without bruit.   Abdomen:     Soft, non-tender   Extremities:   Extremities normal, atraumatic, no cyanosis or edema   Pulses:   2+ and symmetric all extremities   Skin:   " Skin color, texture, turgor normal, no rashes or lesions       Diagnostic Data:  Procedures  Lab Results   Component Value Date    TRIG 122 03/08/2018    HDL 64 (H) 03/08/2018     Lab Results   Component Value Date    GLUCOSE 101 (H) 12/02/2024    BUN 17 12/02/2024    CREATININE 1.20 12/30/2024     12/02/2024    K 3.9 12/02/2024     12/02/2024    CO2 21.6 (L) 12/02/2024     Lab Results   Component Value Date    HGBA1C 5.10 11/18/2024     Lab Results   Component Value Date    WBC 5.67 12/30/2024    HGB 12.3 12/30/2024    HCT 36.8 12/30/2024     12/30/2024       Allergies  Allergies   Allergen Reactions    Lamictal [Lamotrigine] Anaphylaxis    Amlodipine Irritability    Rocephin [Ceftriaxone] Hives    Tetracyclines & Related Other (See Comments)     Low h&h       Current Medications    Current Outpatient Medications:     buPROPion XL (WELLBUTRIN XL) 150 MG 24 hr tablet, Take 1 tablet by mouth Daily., Disp: , Rfl:     buPROPion XL (WELLBUTRIN XL) 300 MG 24 hr tablet, Take 1 tablet by mouth Daily., Disp: , Rfl:     EPINEPHrine (EPIPEN) 0.3 MG/0.3ML solution auto-injector injection, INJECT 1 PEN IN THE MUSCLE ONE TIME AS DIRECTED IF STUNG BY WASP THEN GO TO EMERGENCY ROOM, Disp: , Rfl:     FLUoxetine (PROzac) 40 MG capsule, Take 1 capsule by mouth Daily., Disp: , Rfl:     Fluticasone-Salmeterol (ADVAIR/WIXELA) 100-50 MCG/ACT DISKUS, Inhale 1 puff 2 (Two) Times a Day., Disp: , Rfl:     hydrALAZINE (APRESOLINE) 50 MG tablet, Take 1 tablet by mouth Every 8 (Eight) Hours As Needed (BP >180/100 mm Hg)., Disp: 30 tablet, Rfl: 5    nebivolol (Bystolic) 20 MG tablet, Take 1 tablet by mouth Daily., Disp: 30 tablet, Rfl: 5    PROAIR  (90 Base) MCG/ACT inhaler, Inhale 2 puffs Every 6 (Six) Hours As Needed., Disp: , Rfl:     promethazine (PHENERGAN) 25 MG tablet, Take 1 tablet by mouth Every 6 (Six) Hours As Needed for Nausea or Vomiting., Disp: 10 tablet, Rfl: 0    rOPINIRole (REQUIP) 1 MG tablet, Take 1  tablet by mouth Every Night., Disp: , Rfl:     sacubitril-valsartan (Entresto)  MG tablet, Take 1 tablet by mouth 2 (Two) Times a Day., Disp: 60 tablet, Rfl: 5    topiramate (TOPAMAX) 200 MG tablet, , Disp: , Rfl:     traZODone (DESYREL) 100 MG tablet, Take 1 tablet by mouth As Needed., Disp: , Rfl:     traZODone (DESYREL) 150 MG tablet, Take 1 tablet by mouth As Needed., Disp: , Rfl:     triamterene-hydrochlorothiazide (MAXZIDE-25) 37.5-25 MG per tablet, Take 1 tablet by mouth Daily., Disp: 30 tablet, Rfl: 2    Voquezna 20 MG tablet, Take 1 tablet by mouth Daily., Disp: , Rfl:     ziprasidone (Geodon) 40 MG capsule, Take 1 capsule by mouth 2 (Two) Times a Day With Meals., Disp: , Rfl:           ROS  ROS      SOCIAL HX  Social History     Socioeconomic History    Marital status:    Tobacco Use    Smoking status: Former     Current packs/day: 0.00     Types: Cigarettes     Start date: 1998     Quit date: 2019     Years since quittin.1     Passive exposure: Past    Smokeless tobacco: Never   Vaping Use    Vaping status: Former    Quit date: 2024   Substance and Sexual Activity    Alcohol use: Not Currently     Comment: sober since 2020    Drug use: Not Currently     Comment: 2 years sober    Sexual activity: Not Currently     Partners: Male     Birth control/protection: Hysterectomy, Surgical       FAMILY HX  Family History   Problem Relation Age of Onset    Arthritis Mother     Hypertension Mother     Thyroid disease Mother     Thyroid cancer Mother     Heart disease Father     Hypertension Father     Hyperlipidemia Father     Arrhythmia Father     Heart attack Father     Hypertension Brother     No Known Problems Daughter     No Known Problems Daughter     No Known Problems Daughter     Heart disease Paternal Uncle     Colon cancer Maternal Grandmother     Heart disease Maternal Grandmother     Breast cancer Paternal Grandmother     Diabetes Paternal Grandmother     Colon cancer  Paternal Grandmother     Early death Paternal Grandfather              Shadi Lester III, MD, FACC

## 2025-01-24 ENCOUNTER — OFFICE VISIT (OUTPATIENT)
Dept: NEUROSURGERY | Facility: CLINIC | Age: 45
End: 2025-01-24
Payer: MEDICARE

## 2025-01-24 VITALS — TEMPERATURE: 97.1 F | WEIGHT: 223.6 LBS | BODY MASS INDEX: 32.01 KG/M2 | HEIGHT: 70 IN

## 2025-01-24 DIAGNOSIS — M79.601 BILATERAL ARM PAIN: Primary | ICD-10-CM

## 2025-01-24 DIAGNOSIS — M54.2 NECK PAIN: ICD-10-CM

## 2025-01-24 DIAGNOSIS — M50.30 DDD (DEGENERATIVE DISC DISEASE), CERVICAL: ICD-10-CM

## 2025-01-24 DIAGNOSIS — M79.602 BILATERAL ARM PAIN: Primary | ICD-10-CM

## 2025-01-24 DIAGNOSIS — M47.812 CERVICAL SPONDYLOSIS: ICD-10-CM
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
## 2025-02-24 ENCOUNTER — APPOINTMENT (OUTPATIENT)
Dept: CT IMAGING | Facility: HOSPITAL | Age: 45
End: 2025-02-24
Payer: MEDICARE

## 2025-02-24 ENCOUNTER — HOSPITAL ENCOUNTER (EMERGENCY)
Facility: HOSPITAL | Age: 45
Discharge: HOME | End: 2025-02-25
Attending: STUDENT IN AN ORGANIZED HEALTH CARE EDUCATION/TRAINING PROGRAM | Admitting: STUDENT IN AN ORGANIZED HEALTH CARE EDUCATION/TRAINING PROGRAM
Payer: MEDICARE

## 2025-02-24 DIAGNOSIS — R11.2 NAUSEA AND VOMITING, UNSPECIFIED VOMITING TYPE: Primary | ICD-10-CM

## 2025-02-24 DIAGNOSIS — Z86.59 HISTORY OF BIPOLAR DISORDER: ICD-10-CM

## 2025-02-24 DIAGNOSIS — Z86.79 HISTORY OF HYPERTENSION: ICD-10-CM

## 2025-02-24 DIAGNOSIS — F10.11 HISTORY OF ALCOHOL ABUSE: ICD-10-CM

## 2025-02-24 DIAGNOSIS — R10.9 ABDOMINAL PAIN, UNSPECIFIED ABDOMINAL LOCATION: ICD-10-CM

## 2025-02-24 LAB
ALBUMIN SERPL-MCNC: 4.6 G/DL (ref 3.5–5.2)
ALBUMIN/GLOB SERPL: 1.3 G/DL
ALP SERPL-CCNC: 104 U/L (ref 39–117)
ALT SERPL W P-5'-P-CCNC: 19 U/L (ref 1–33)
ANION GAP SERPL CALCULATED.3IONS-SCNC: 18 MMOL/L (ref 5–15)
AST SERPL-CCNC: 22 U/L (ref 1–32)
BACTERIA UR QL AUTO: ABNORMAL /HPF
BILIRUB SERPL-MCNC: 0.5 MG/DL (ref 0–1.2)
BILIRUB UR QL STRIP: NEGATIVE
BUN SERPL-MCNC: 25 MG/DL (ref 6–20)
BUN/CREAT SERPL: 15.9 (ref 7–25)
CALCIUM SPEC-SCNC: 11.1 MG/DL (ref 8.6–10.5)
CHLORIDE SERPL-SCNC: 94 MMOL/L (ref 98–107)
CLARITY UR: CLEAR
CO2 SERPL-SCNC: 21 MMOL/L (ref 22–29)
COLOR UR: YELLOW
CREAT SERPL-MCNC: 1.57 MG/DL (ref 0.57–1)
D-LACTATE SERPL-SCNC: 1.2 MMOL/L (ref 0.5–2)
EGFRCR SERPLBLD CKD-EPI 2021: 41.5 ML/MIN/1.73
GLOBULIN UR ELPH-MCNC: 3.6 GM/DL
GLUCOSE SERPL-MCNC: 96 MG/DL (ref 65–99)
GLUCOSE UR STRIP-MCNC: NEGATIVE MG/DL
HGB UR QL STRIP.AUTO: NEGATIVE
HOLD SPECIMEN: NORMAL
HOLD SPECIMEN: NORMAL
HYALINE CASTS UR QL AUTO: ABNORMAL /LPF
KETONES UR QL STRIP: ABNORMAL
LEUKOCYTE ESTERASE UR QL STRIP.AUTO: ABNORMAL
LIPASE SERPL-CCNC: 17 U/L (ref 13–60)
NITRITE UR QL STRIP: NEGATIVE
PH UR STRIP.AUTO: <=5 [PH] (ref 5–8)
POTASSIUM SERPL-SCNC: 3.3 MMOL/L (ref 3.5–5.2)
PROT SERPL-MCNC: 8.2 G/DL (ref 6–8.5)
PROT UR QL STRIP: NEGATIVE
RBC # UR STRIP: ABNORMAL /HPF
REF LAB TEST METHOD: ABNORMAL
SODIUM SERPL-SCNC: 133 MMOL/L (ref 136–145)
SP GR UR STRIP: 1.02 (ref 1–1.03)
SQUAMOUS #/AREA URNS HPF: ABNORMAL /HPF
TROPONIN T SERPL HS-MCNC: <6 NG/L
UROBILINOGEN UR QL STRIP: ABNORMAL
WBC # UR STRIP: ABNORMAL /HPF

## 2025-02-24 PROCEDURE — 80053 COMPREHEN METABOLIC PANEL: CPT

## 2025-02-24 PROCEDURE — 25010000002 ONDANSETRON PER 1 MG: Performed by: PHYSICIAN ASSISTANT

## 2025-02-24 PROCEDURE — 81001 URINALYSIS AUTO W/SCOPE: CPT | Performed by: STUDENT IN AN ORGANIZED HEALTH CARE EDUCATION/TRAINING PROGRAM

## 2025-02-24 PROCEDURE — 96374 THER/PROPH/DIAG INJ IV PUSH: CPT

## 2025-02-24 PROCEDURE — 74177 CT ABD & PELVIS W/CONTRAST: CPT

## 2025-02-24 PROCEDURE — 93005 ELECTROCARDIOGRAM TRACING: CPT | Performed by: STUDENT IN AN ORGANIZED HEALTH CARE EDUCATION/TRAINING PROGRAM

## 2025-02-24 PROCEDURE — 36415 COLL VENOUS BLD VENIPUNCTURE: CPT

## 2025-02-24 PROCEDURE — 83690 ASSAY OF LIPASE: CPT

## 2025-02-24 PROCEDURE — 25510000001 IOPAMIDOL 61 % SOLUTION: Performed by: STUDENT IN AN ORGANIZED HEALTH CARE EDUCATION/TRAINING PROGRAM

## 2025-02-24 PROCEDURE — 93005 ELECTROCARDIOGRAM TRACING: CPT

## 2025-02-24 PROCEDURE — 99285 EMERGENCY DEPT VISIT HI MDM: CPT

## 2025-02-24 PROCEDURE — 25810000003 SODIUM CHLORIDE 0.9 % SOLUTION: Performed by: PHYSICIAN ASSISTANT

## 2025-02-24 PROCEDURE — 84484 ASSAY OF TROPONIN QUANT: CPT

## 2025-02-24 PROCEDURE — 83605 ASSAY OF LACTIC ACID: CPT | Performed by: PHYSICIAN ASSISTANT

## 2025-02-24 RX ORDER — SODIUM CHLORIDE 0.9 % (FLUSH) 0.9 %
10 SYRINGE (ML) INJECTION AS NEEDED
Status: DISCONTINUED | OUTPATIENT
Start: 2025-02-24 | End: 2025-02-25 | Stop reason: HOSPADM

## 2025-02-24 RX ORDER — IOPAMIDOL 612 MG/ML
100 INJECTION, SOLUTION INTRAVASCULAR
Status: COMPLETED | OUTPATIENT
Start: 2025-02-24 | End: 2025-02-24

## 2025-02-24 RX ORDER — ONDANSETRON 2 MG/ML
4 INJECTION INTRAMUSCULAR; INTRAVENOUS ONCE
Status: COMPLETED | OUTPATIENT
Start: 2025-02-24 | End: 2025-02-24

## 2025-02-24 RX ADMIN — SODIUM CHLORIDE 1000 ML: 9 INJECTION, SOLUTION INTRAVENOUS at 23:00

## 2025-02-24 RX ADMIN — ONDANSETRON 4 MG: 2 INJECTION INTRAMUSCULAR; INTRAVENOUS at 21:41

## 2025-02-24 RX ADMIN — IOPAMIDOL 85 ML: 612 INJECTION, SOLUTION INTRAVENOUS at 22:21

## 2025-02-25 VITALS
HEIGHT: 70 IN | WEIGHT: 217 LBS | HEART RATE: 85 BPM | BODY MASS INDEX: 31.07 KG/M2 | DIASTOLIC BLOOD PRESSURE: 79 MMHG | OXYGEN SATURATION: 99 % | RESPIRATION RATE: 19 BRPM | TEMPERATURE: 98.9 F | SYSTOLIC BLOOD PRESSURE: 134 MMHG

## 2025-02-25 RX ORDER — PROMETHAZINE HYDROCHLORIDE 25 MG/1
25 SUPPOSITORY RECTAL EVERY 4 HOURS PRN
Qty: 12 SUPPOSITORY | Refills: 0 | Status: SHIPPED | OUTPATIENT
Start: 2025-02-25 | End: 2025-02-28

## 2025-02-25 RX ORDER — PROMETHAZINE HYDROCHLORIDE 25 MG/1
25 SUPPOSITORY RECTAL ONCE
Status: COMPLETED | OUTPATIENT
Start: 2025-02-25 | End: 2025-02-25

## 2025-02-25 RX ADMIN — PROMETHAZINE HYDROCHLORIDE 25 MG: 25 SUPPOSITORY RECTAL at 02:22

## 2025-02-26 ENCOUNTER — TELEPHONE (OUTPATIENT)
Dept: INTERNAL MEDICINE | Facility: CLINIC | Age: 45
End: 2025-02-26

## 2025-02-26 ENCOUNTER — LAB (OUTPATIENT)
Dept: LAB | Facility: HOSPITAL | Age: 45
End: 2025-02-26
Payer: MEDICARE

## 2025-02-26 ENCOUNTER — OFFICE VISIT (OUTPATIENT)
Dept: INTERNAL MEDICINE | Facility: CLINIC | Age: 45
End: 2025-02-26
Payer: MEDICARE

## 2025-02-26 VITALS
WEIGHT: 220 LBS | BODY MASS INDEX: 31.5 KG/M2 | OXYGEN SATURATION: 99 % | HEIGHT: 70 IN | TEMPERATURE: 98 F | RESPIRATION RATE: 18 BRPM | HEART RATE: 89 BPM | SYSTOLIC BLOOD PRESSURE: 120 MMHG | DIASTOLIC BLOOD PRESSURE: 78 MMHG

## 2025-02-26 DIAGNOSIS — E78.2 MIXED HYPERLIPIDEMIA: ICD-10-CM

## 2025-02-26 DIAGNOSIS — Z76.89 ENCOUNTER TO ESTABLISH CARE WITH NEW DOCTOR: Primary | ICD-10-CM

## 2025-02-26 DIAGNOSIS — R14.0 ABDOMINAL DISTENSION: ICD-10-CM

## 2025-02-26 DIAGNOSIS — R74.8 ELEVATED LIVER ENZYMES: ICD-10-CM

## 2025-02-26 DIAGNOSIS — R10.84 GENERALIZED ABDOMINAL PAIN: ICD-10-CM

## 2025-02-26 DIAGNOSIS — N18.31 STAGE 3A CHRONIC KIDNEY DISEASE: ICD-10-CM

## 2025-02-26 PROBLEM — R07.89 ATYPICAL CHEST PAIN: Status: RESOLVED | Noted: 2019-01-02 | Resolved: 2025-02-26

## 2025-02-26 LAB
ALBUMIN SERPL-MCNC: 4.3 G/DL (ref 3.5–5.2)
ALBUMIN/GLOB SERPL: 1.2 G/DL
ALP SERPL-CCNC: 93 U/L (ref 39–117)
ALT SERPL W P-5'-P-CCNC: 20 U/L (ref 1–33)
ANION GAP SERPL CALCULATED.3IONS-SCNC: 16.5 MMOL/L (ref 5–15)
AST SERPL-CCNC: 30 U/L (ref 1–32)
BASOPHILS # BLD AUTO: 0.05 10*3/MM3 (ref 0–0.2)
BASOPHILS NFR BLD AUTO: 0.9 % (ref 0–1.5)
BILIRUB SERPL-MCNC: <0.2 MG/DL (ref 0–1.2)
BUN SERPL-MCNC: 14 MG/DL (ref 6–20)
BUN/CREAT SERPL: 13 (ref 7–25)
CALCIUM SPEC-SCNC: 10 MG/DL (ref 8.6–10.5)
CHLORIDE SERPL-SCNC: 100 MMOL/L (ref 98–107)
CHOLEST SERPL-MCNC: 210 MG/DL (ref 0–200)
CO2 SERPL-SCNC: 20.5 MMOL/L (ref 22–29)
CREAT SERPL-MCNC: 1.08 MG/DL (ref 0.57–1)
DEPRECATED RDW RBC AUTO: 46 FL (ref 37–54)
EGFRCR SERPLBLD CKD-EPI 2021: 65.1 ML/MIN/1.73
EOSINOPHIL # BLD AUTO: 0.17 10*3/MM3 (ref 0–0.4)
EOSINOPHIL NFR BLD AUTO: 3 % (ref 0.3–6.2)
ERYTHROCYTE [DISTWIDTH] IN BLOOD BY AUTOMATED COUNT: 14.2 % (ref 12.3–15.4)
GLOBULIN UR ELPH-MCNC: 3.5 GM/DL
GLUCOSE SERPL-MCNC: 66 MG/DL (ref 65–99)
HCT VFR BLD AUTO: 41.2 % (ref 34–46.6)
HDLC SERPL-MCNC: 52 MG/DL (ref 40–60)
HGB BLD-MCNC: 13.6 G/DL (ref 12–15.9)
IMM GRANULOCYTES # BLD AUTO: 0.02 10*3/MM3 (ref 0–0.05)
IMM GRANULOCYTES NFR BLD AUTO: 0.3 % (ref 0–0.5)
LDLC SERPL CALC-MCNC: 145 MG/DL (ref 0–100)
LDLC/HDLC SERPL: 2.76 {RATIO}
LYMPHOCYTES # BLD AUTO: 1.66 10*3/MM3 (ref 0.7–3.1)
LYMPHOCYTES NFR BLD AUTO: 28.8 % (ref 19.6–45.3)
MCH RBC QN AUTO: 29.6 PG (ref 26.6–33)
MCHC RBC AUTO-ENTMCNC: 33 G/DL (ref 31.5–35.7)
MCV RBC AUTO: 89.8 FL (ref 79–97)
MONOCYTES # BLD AUTO: 0.47 10*3/MM3 (ref 0.1–0.9)
MONOCYTES NFR BLD AUTO: 8.2 % (ref 5–12)
NEUTROPHILS NFR BLD AUTO: 3.39 10*3/MM3 (ref 1.7–7)
NEUTROPHILS NFR BLD AUTO: 58.8 % (ref 42.7–76)
NRBC BLD AUTO-RTO: 0 /100 WBC (ref 0–0.2)
PLATELET # BLD AUTO: 342 10*3/MM3 (ref 140–450)
PMV BLD AUTO: 10.3 FL (ref 6–12)
POTASSIUM SERPL-SCNC: 3.9 MMOL/L (ref 3.5–5.2)
PROT SERPL-MCNC: 7.8 G/DL (ref 6–8.5)
RBC # BLD AUTO: 4.59 10*6/MM3 (ref 3.77–5.28)
SODIUM SERPL-SCNC: 137 MMOL/L (ref 136–145)
TRIGL SERPL-MCNC: 73 MG/DL (ref 0–150)
VLDLC SERPL-MCNC: 13 MG/DL (ref 5–40)
WBC NRBC COR # BLD AUTO: 5.76 10*3/MM3 (ref 3.4–10.8)

## 2025-02-26 PROCEDURE — 85025 COMPLETE CBC W/AUTO DIFF WBC: CPT

## 2025-02-26 PROCEDURE — 3074F SYST BP LT 130 MM HG: CPT | Performed by: FAMILY MEDICINE

## 2025-02-26 PROCEDURE — 80053 COMPREHEN METABOLIC PANEL: CPT

## 2025-02-26 PROCEDURE — 99214 OFFICE O/P EST MOD 30 MIN: CPT | Performed by: FAMILY MEDICINE

## 2025-02-26 PROCEDURE — 3078F DIAST BP <80 MM HG: CPT | Performed by: FAMILY MEDICINE

## 2025-02-26 PROCEDURE — 1160F RVW MEDS BY RX/DR IN RCRD: CPT | Performed by: FAMILY MEDICINE

## 2025-02-26 PROCEDURE — 1125F AMNT PAIN NOTED PAIN PRSNT: CPT | Performed by: FAMILY MEDICINE

## 2025-02-26 PROCEDURE — 1159F MED LIST DOCD IN RCRD: CPT | Performed by: FAMILY MEDICINE

## 2025-02-26 PROCEDURE — 80061 LIPID PANEL: CPT

## 2025-02-26 RX ORDER — ZIPRASIDONE HYDROCHLORIDE 60 MG/1
60 CAPSULE ORAL DAILY
COMMUNITY
Start: 2025-02-02

## 2025-02-26 RX ORDER — LANSOPRAZOLE 30 MG/1
1 CAPSULE, DELAYED RELEASE ORAL EVERY 12 HOURS SCHEDULED
COMMUNITY
Start: 2025-02-17
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
## 2025-02-26 NOTE — PATIENT INSTRUCTIONS
Diagnosis Discussed   Continue to monitor   Plenty of fluids, monitor diet and exercise   Labs ordered will notify of results   Take medications as instructed  US liver ordered for further evaluation   Will reach to GI for further management   Keep current GI appointment   Follow up as directed   If symptoms worsen or persist please seek further evaluation    If intense pain- report to nearest ER for immediate further evaluation

## 2025-02-26 NOTE — Clinical Note
Hi! Just needing some help on this one. She's establishing with me today. Concerns for abdominal distension and pain. Recently in the ER- nothing found. I ordered some repeat labs and US of liver. She has follow up with you in March sometime. Anything else you might suggest or anything else we could do. Cirrhosis? Liver enzymes have been up and down and she's 4 years sober. Thoughts? Please advise.  Thanks  Dr. Card
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
## 2025-02-26 NOTE — TELEPHONE ENCOUNTER
CHECK OUT NOTES STATED TO SCHEDULE ANNUAL IN 1 MONTH. THERE WAS NO AVAILABILITY, PATIENT STATES THAT SHE CAN DO ANY DAY AFTER 9AM.    PLEASE ADVISE      CALL BACK: [phone]

## 2025-02-27 ENCOUNTER — TELEPHONE (OUTPATIENT)
Dept: INTERNAL MEDICINE | Facility: CLINIC | Age: 45
End: 2025-02-27
Payer: MEDICARE

## 2025-02-27 NOTE — TELEPHONE ENCOUNTER
HUB TO READ      LEFT VOICEMAIL LETTING THE PATIENT KNOW THAT WE HAVE SCHEDULED HER FOR HER ANNUAL WITH DR. SMITH ON 03- AT 1:00PM.    MAILED OUT REMINDER.

## 2025-02-28 ENCOUNTER — PATIENT ROUNDING (BHMG ONLY) (OUTPATIENT)
Dept: INTERNAL MEDICINE | Facility: CLINIC | Age: 45
End: 2025-02-28
Payer: MEDICARE

## 2025-02-28 NOTE — PROGRESS NOTES
A My-chart message has been sent to the patient [place] [facility] [place]

## 2025-03-01 LAB
QT INTERVAL: 336 MS
QTC INTERVAL: 446 MS

## 2025-03-08 ENCOUNTER — READMISSION MANAGEMENT (OUTPATIENT)
Dept: CALL CENTER | Facility: HOSPITAL | Age: 45
End: 2025-03-08
Payer: MEDICARE

## 2025-03-08 NOTE — OUTREACH NOTE
Prep Survey      Flowsheet Row Responses   [facility] facility patient discharged from? Non-BH   Is LACE score < 7 ? Non-BH Discharge   Eligibility [facility]   Date of Admission 03/06/25   Date of Discharge 03/08/25   Discharge diagnosis NSTEMI (non-ST elevated myocardial infarction)   Does the patient have one of the following disease processes/diagnoses(primary or secondary)? Acute MI (STEMI,NSTEMI)   Prep survey completed? Yes            EMILEE CHEW - Registered Nurse

## 2025-03-10 ENCOUNTER — TRANSITIONAL CARE MANAGEMENT TELEPHONE ENCOUNTER (OUTPATIENT)
Dept: CALL CENTER | Facility: HOSPITAL | Age: 45
End: 2025-03-10
Payer: MEDICARE

## 2025-03-10 NOTE — OUTREACH NOTE
Call Center TCM Note      Flowsheet Row Responses   [facility] patient discharged from? Non-BH   Does the patient have one of the following disease processes/diagnoses(primary or secondary)? Acute MI (STEMI,NSTEMI)   TCM attempt successful? Yes   Call start time 1420   Call end time 1421   Person spoke with today (if not patient) and relationship Patient   Comments 3/13/2025  3:00 PM  HOSPITAL FOLLOW UP 30 min [facility] INTERNAL MEDICINE America Card MD   Does the patient have an appointment with their PCP within 7-14 days of discharge? No   Nursing Interventions Assisted patient with making appointment per protocol   Has home health visited the patient within 72 hours of discharge? N/A   Psychosocial issues? No   What is the patient's perception of their health status since discharge? Improving   Is the patient/caregiver able to teach back signs and symptoms related to disease process for when to call PCP? Yes   Is the patient/caregiver able to teach back signs and symptoms related to disease process for when to call 911? Yes   Is the patient/caregiver able to teach back the hierarchy of who to call/visit for symptoms/problems? PCP, Specialist, Home health nurse, Urgent Care, ED, 911 Yes   TCM call completed? Yes   Wrap up additional comments Patient DC from [facility]. States she is doing well. She will be seeing a new cardiologist at . Overall doing well no concerns or questions noted.   Call end time 1421   Would this patient benefit from a Referral to Amb Social Work? No   Is the patient interested in additional calls from an ambulatory ? No            Toshia Mota, CUATE    3/10/2025, 14:21 EDT

## 2025-03-13 ENCOUNTER — OFFICE VISIT (OUTPATIENT)
Dept: INTERNAL MEDICINE | Facility: CLINIC | Age: 45
End: 2025-03-13
Payer: MEDICARE

## 2025-03-13 ENCOUNTER — LAB (OUTPATIENT)
Dept: LAB | Facility: HOSPITAL | Age: 45
End: 2025-03-13
Payer: MEDICARE

## 2025-03-13 VITALS
DIASTOLIC BLOOD PRESSURE: 90 MMHG | TEMPERATURE: 98 F | HEART RATE: 98 BPM | SYSTOLIC BLOOD PRESSURE: 148 MMHG | OXYGEN SATURATION: 98 % | WEIGHT: 223 LBS | HEIGHT: 70 IN | BODY MASS INDEX: 31.92 KG/M2

## 2025-03-13 DIAGNOSIS — I10 ESSENTIAL HYPERTENSION: ICD-10-CM

## 2025-03-13 DIAGNOSIS — I21.4 NSTEMI (NON-ST ELEVATED MYOCARDIAL INFARCTION): ICD-10-CM

## 2025-03-13 DIAGNOSIS — G25.81 RESTLESS LEG SYNDROME: ICD-10-CM

## 2025-03-13 DIAGNOSIS — R79.9 ABNORMAL FINDING OF BLOOD CHEMISTRY, UNSPECIFIED: ICD-10-CM

## 2025-03-13 DIAGNOSIS — Z76.89 ENCOUNTER FOR SUPPORT AND COORDINATION OF TRANSITION OF CARE: Primary | ICD-10-CM

## 2025-03-13 PROCEDURE — 36415 COLL VENOUS BLD VENIPUNCTURE: CPT

## 2025-03-13 PROCEDURE — 84466 ASSAY OF TRANSFERRIN: CPT

## 2025-03-13 PROCEDURE — 82728 ASSAY OF FERRITIN: CPT

## 2025-03-13 PROCEDURE — 80053 COMPREHEN METABOLIC PANEL: CPT

## 2025-03-13 PROCEDURE — 83735 ASSAY OF MAGNESIUM: CPT

## 2025-03-13 PROCEDURE — 83540 ASSAY OF IRON: CPT

## 2025-03-13 PROCEDURE — 85027 COMPLETE CBC AUTOMATED: CPT

## 2025-03-13 PROCEDURE — 83835 ASSAY OF METANEPHRINES: CPT

## 2025-03-13 RX ORDER — ATORVASTATIN CALCIUM 80 MG/1
80 TABLET, FILM COATED ORAL NIGHTLY
COMMUNITY
Start: 2025-03-08

## 2025-03-13 RX ORDER — PRASUGREL 10 MG/1
10 TABLET, FILM COATED ORAL DAILY
COMMUNITY
Start: 2025-03-09

## 2025-03-13 RX ORDER — ASPIRIN 81 MG/1
81 TABLET, CHEWABLE ORAL DAILY
COMMUNITY
Start: 2025-03-09

## 2025-03-13 RX ORDER — SACUBITRIL AND VALSARTAN 24; 26 MG/1; MG/1
1 TABLET, FILM COATED ORAL 2 TIMES DAILY
COMMUNITY

## 2025-03-13 RX ORDER — CARVEDILOL 6.25 MG/1
6.25 TABLET ORAL 2 TIMES DAILY WITH MEALS
COMMUNITY
Start: 2025-03-08

## 2025-03-13 RX ORDER — PREGABALIN 50 MG/1
50 CAPSULE ORAL NIGHTLY PRN
Qty: 30 CAPSULE | Refills: 1 | Status: SHIPPED | OUTPATIENT
Start: 2025-03-13

## 2025-03-13 RX ORDER — ZIPRASIDONE HYDROCHLORIDE 40 MG/1
40 CAPSULE ORAL DAILY
Status: SHIPPED | COMMUNITY
Start: 2025-03-13

## 2025-03-13 RX ORDER — NITROGLYCERIN 0.4 MG/1
0.4 TABLET SUBLINGUAL
COMMUNITY
Start: 2025-03-08

## 2025-03-13 NOTE — PATIENT INSTRUCTIONS
Diagnosis Discussed   Continue to monitor   Plenty of fluids  Labs today- will notify of results when available   Follow up with Cardiology- Dr. Vilchis or Dr. Garcia   Take medications as directed as prescribed   Continue to monitor BP at home as directed   Follow up with Psychiatry as directed   Start Cardiac Rehab as scheduled   If symptoms worsen or persist please seek further evaluation

## 2025-03-13 NOTE — PROGRESS NOTES
Transitional Care Follow Up Visit  Subjective     Bebe Johanna Rincon is a 45 y.o. female who presents for a transitional care management visit.    Within 48 business hours after discharge our office contacted her via telephone to coordinate her care and needs.      I reviewed and discussed the details of that call along with the discharge summary, hospital problems, inpatient lab results, inpatient diagnostic studies, and consultation reports with Bebe.     Current outpatient and discharge medications have been reconciled for the patient.  Reviewed by: America Card MD          3/8/2025     5:58 PM   Date of TCM Phone Call   Hospital [facility]   Date of Admission 3/6/2025   Date of Discharge 3/8/2025     Risk for Readmission (LACE) No data recorded    History of Present Illness   Course During Hospital Stay:  3/6/2025-3/8/2025  Bebe Rincon is a 45 y.o. female with PMH of erosive esophagitis c/b esophageal stenosis, HTN, and bipolar disorder who presented to [facility] on 3/6/2025 with acute sharp chest pain radiating to the neck and left shoulder and elevated troponin. Found to have NSTEMI   aken to cath lab, where she underwent a coronary angiogram followed by PCI where two stents were placed (LAD and D2), complicated by jailing of large septal   - Patient suffered persistent severe chest pain, nausea, emesis following initial LHC. Nitroglycerin ggt difficult to wean. On 3/7 serial repeat ECGs revealed diffuse T wave inversion and Q waves. Due to concern for ongoing infarction, she was taken back for LHC. Repeat LHC was unremarkable.   - continue ASA 81mg daily, prasugrel daily, lipitor 80 mg daily   - continue Xarelto 2.5mg BID for 30 days for LV thrombus prophylaxis     #Acute HFrEF likely 2/2 ischemic cardiomyopathy   - TTE 3/6/25 revealed EF 34%, global apical hypokinesis  - Continue enteresto and carvedilol 12.5mg bid  - Due to hypotension, held off on starting SGLT-2 and MRA at this time. Will  need remainder of GDMT titration outpatient.   - referral sent to cardiac rehab     Please take your coreg and entresto as prescribed   Please take the Xarelto 2.5mg twice daily for 30 days for LV thrombus prophylaxis   Please take your effient and aspirin for 1 year   Please follow up with the transition of care clinic in 1 week   Monitor your Blood pressure daily at home to ensure your blood pressure doesn't drop too low     Starts cardiac rehab 3/24/2025  [place]- heart cath -interventional   [facility]- Cardiology     Is weaning off of Geodon currently at 40mg- working with psychiatry- will continue to wean off     Currently ropinirole 1.5mg nightly for restless leg - feels this not effective     Lots of stress and anxiety around the situation- not sleeping   Trazodone- doesn't like it feels   Has tried lyrica in the past- possible side effects     For sleep has tried Tylenol PM   Seroquel   Cymbalta   Ambien- can't remember- possible sleep walking   Feels she's scared to sleep   Will try to work in with psychiatry sooner     Headaches  Denies vision changes   Right side of head squeezing  Is drinking plenty water       The following portions of the patient's history were reviewed and updated as appropriate: allergies, current medications, past family history, past medical history, past social history, past surgical history, and problem list.    Review of Systems   Constitutional:  Negative for chills and fever.   Respiratory:  Negative for cough and shortness of breath.    Cardiovascular:  Negative for chest pain, palpitations and leg swelling.   Gastrointestinal:  Negative for abdominal pain, diarrhea, nausea and vomiting.   Musculoskeletal:  Negative for gait problem.   Neurological:  Positive for headaches. Negative for light-headedness.   Psychiatric/Behavioral:  Positive for dysphoric mood and sleep disturbance. Negative for self-injury and suicidal ideas. The patient is nervous/anxious.        Objective   BP  "148/90 (BP Location: Left arm, Patient Position: Sitting, Cuff Size: Adult)   Pulse 98   Temp 98 °F (36.7 °C)   Ht 177.8 cm (70\")   Wt 101 kg (223 lb)   SpO2 98%   BMI 32.00 kg/m²   Physical Exam  Vitals and nursing note reviewed.   Constitutional:       General: She is not in acute distress.     Appearance: Normal appearance.   HENT:      Head: Normocephalic and atraumatic.   Cardiovascular:      Rate and Rhythm: Normal rate and regular rhythm.      Heart sounds: Normal heart sounds.   Pulmonary:      Effort: Pulmonary effort is normal. No respiratory distress.      Breath sounds: Normal breath sounds.   Skin:     General: Skin is warm and dry.   Neurological:      General: No focal deficit present.      Mental Status: She is alert and oriented to person, place, and time.   Psychiatric:         Attention and Perception: Attention normal.         Mood and Affect: Mood is anxious and depressed. Affect is tearful.         Speech: Speech normal.         Behavior: Behavior normal.         Cognition and Memory: Cognition normal.         Judgment: Judgment normal.         Assessment & Plan   Diagnoses and all orders for this visit:    1. Encounter for support and coordination of transition of care (Primary)  Diagnosis Discussed   Continue to monitor   Plenty of fluids  Labs today- will notify of results when available   Follow up with Cardiology- Dr. Vilchis or Dr. Garcia   Take medications as directed as prescribed   Continue to monitor BP at home as directed   Follow up with Psychiatry as directed   Start Cardiac Rehab as scheduled   If symptoms worsen or persist please seek further evaluation     2. NSTEMI (non-ST elevated myocardial infarction)  -     CBC (No Diff); Future  -     Comprehensive Metabolic Panel; Future  -     Magnesium; Future  -     Iron Profile; Future  -     Ferritin; Future    3. Essential hypertension  -     CBC (No Diff); Future  -     Comprehensive Metabolic Panel; Future    4. Restless " leg syndrome  -     CBC (No Diff); Future  -     Comprehensive Metabolic Panel; Future  -     Magnesium; Future  -     Iron Profile; Future  -     Ferritin; Future  -     pregabalin (Lyrica) 50 MG capsule; Take 1 capsule by mouth At Night As Needed (restless leg syndrome).  Dispense: 30 capsule; Refill: 1    5. Abnormal finding of blood chemistry, unspecified  -     Iron Profile; Future  -     Ferritin; Future

## 2025-03-14 LAB
ALBUMIN SERPL-MCNC: 3.7 G/DL (ref 3.5–5.2)
ALBUMIN/GLOB SERPL: 0.9 G/DL
ALP SERPL-CCNC: 107 U/L (ref 39–117)
ALT SERPL W P-5'-P-CCNC: 22 U/L (ref 1–33)
ANION GAP SERPL CALCULATED.3IONS-SCNC: 14.3 MMOL/L (ref 5–15)
AST SERPL-CCNC: 22 U/L (ref 1–32)
BILIRUB SERPL-MCNC: <0.2 MG/DL (ref 0–1.2)
BUN SERPL-MCNC: 13 MG/DL (ref 6–20)
BUN/CREAT SERPL: 10.8 (ref 7–25)
CALCIUM SPEC-SCNC: 10.1 MG/DL (ref 8.6–10.5)
CHLORIDE SERPL-SCNC: 104 MMOL/L (ref 98–107)
CO2 SERPL-SCNC: 20.7 MMOL/L (ref 22–29)
CREAT SERPL-MCNC: 1.2 MG/DL (ref 0.57–1)
DEPRECATED RDW RBC AUTO: 48.2 FL (ref 37–54)
EGFRCR SERPLBLD CKD-EPI 2021: 57.4 ML/MIN/1.73
ERYTHROCYTE [DISTWIDTH] IN BLOOD BY AUTOMATED COUNT: 14.5 % (ref 12.3–15.4)
FERRITIN SERPL-MCNC: 69.9 NG/ML (ref 13–150)
GLOBULIN UR ELPH-MCNC: 3.9 GM/DL
GLUCOSE SERPL-MCNC: 80 MG/DL (ref 65–99)
HCT VFR BLD AUTO: 41 % (ref 34–46.6)
HGB BLD-MCNC: 13.1 G/DL (ref 12–15.9)
IRON 24H UR-MRATE: 38 MCG/DL (ref 37–145)
IRON SATN MFR SERPL: 8 % (ref 20–50)
MAGNESIUM SERPL-MCNC: 2.1 MG/DL (ref 1.6–2.6)
MCH RBC QN AUTO: 28.8 PG (ref 26.6–33)
MCHC RBC AUTO-ENTMCNC: 32 G/DL (ref 31.5–35.7)
MCV RBC AUTO: 90.1 FL (ref 79–97)
PLATELET # BLD AUTO: 407 10*3/MM3 (ref 140–450)
PMV BLD AUTO: 10.5 FL (ref 6–12)
POTASSIUM SERPL-SCNC: 4.5 MMOL/L (ref 3.5–5.2)
PROT SERPL-MCNC: 7.6 G/DL (ref 6–8.5)
RBC # BLD AUTO: 4.55 10*6/MM3 (ref 3.77–5.28)
SODIUM SERPL-SCNC: 139 MMOL/L (ref 136–145)
TIBC SERPL-MCNC: 486 MCG/DL (ref 298–536)
TRANSFERRIN SERPL-MCNC: 326 MG/DL (ref 200–360)
WBC NRBC COR # BLD AUTO: 6.16 10*3/MM3 (ref 3.4–10.8)

## 2025-03-20 LAB
METANEPH FREE SERPL-MCNC: <25 PG/ML (ref 0–88)
NORMETANEPHRINE SERPL-MCNC: 70.5 PG/ML (ref 0–218.9)

## 2025-03-21 ENCOUNTER — LAB (OUTPATIENT)
Dept: LAB | Facility: HOSPITAL | Age: 45
End: 2025-03-21
Payer: MEDICARE

## 2025-03-21 DIAGNOSIS — I1A.0 RESISTANT HYPERTENSION: ICD-10-CM

## 2025-03-21 DIAGNOSIS — I21.4 NSTEMI (NON-ST ELEVATED MYOCARDIAL INFARCTION): ICD-10-CM

## 2025-03-21 DIAGNOSIS — R14.0 ABDOMINAL DISTENSION: ICD-10-CM

## 2025-03-21 DIAGNOSIS — R10.84 GENERALIZED ABDOMINAL PAIN: ICD-10-CM

## 2025-03-21 DIAGNOSIS — I10 ESSENTIAL HYPERTENSION: Primary | ICD-10-CM

## 2025-03-21 DIAGNOSIS — E78.2 MIXED HYPERLIPIDEMIA: ICD-10-CM

## 2025-03-21 DIAGNOSIS — N18.31 STAGE 3A CHRONIC KIDNEY DISEASE: ICD-10-CM

## 2025-03-21 DIAGNOSIS — R74.8 ELEVATED LIVER ENZYMES: ICD-10-CM

## 2025-03-21 DIAGNOSIS — G25.81 RESTLESS LEG SYNDROME: ICD-10-CM

## 2025-03-21 DIAGNOSIS — R79.9 ABNORMAL FINDING OF BLOOD CHEMISTRY, UNSPECIFIED: ICD-10-CM

## 2025-03-21 PROCEDURE — 84244 ASSAY OF RENIN: CPT

## 2025-03-21 PROCEDURE — 82088 ASSAY OF ALDOSTERONE: CPT

## 2025-03-21 PROCEDURE — 36415 COLL VENOUS BLD VENIPUNCTURE: CPT

## 2025-03-29 LAB
ALDOST SERPL-MCNC: 18.4 NG/DL (ref 0–30)
ALDOST/RENIN PLAS-RTO: 2.4 {RATIO} (ref 0–30)
RENIN PLAS-CCNC: 7.63 NG/ML/HR (ref 0.17–5.38)

## 2025-03-31 ENCOUNTER — LAB (OUTPATIENT)
Dept: LAB | Facility: HOSPITAL | Age: 45
End: 2025-03-31
Payer: MEDICARE

## 2025-03-31 ENCOUNTER — OFFICE VISIT (OUTPATIENT)
Dept: INTERNAL MEDICINE | Facility: CLINIC | Age: 45
End: 2025-03-31
Payer: MEDICARE

## 2025-03-31 VITALS
HEART RATE: 68 BPM | HEIGHT: 70 IN | WEIGHT: 220 LBS | SYSTOLIC BLOOD PRESSURE: 124 MMHG | DIASTOLIC BLOOD PRESSURE: 80 MMHG | OXYGEN SATURATION: 100 % | BODY MASS INDEX: 31.5 KG/M2 | RESPIRATION RATE: 16 BRPM

## 2025-03-31 DIAGNOSIS — Z13.29 SCREENING FOR ENDOCRINE, NUTRITIONAL, METABOLIC AND IMMUNITY DISORDER: ICD-10-CM

## 2025-03-31 DIAGNOSIS — T78.2XXD ANAPHYLAXIS, SUBSEQUENT ENCOUNTER: ICD-10-CM

## 2025-03-31 DIAGNOSIS — Z12.31 VISIT FOR SCREENING MAMMOGRAM: ICD-10-CM

## 2025-03-31 DIAGNOSIS — Z00.00 WELLNESS EXAMINATION: Primary | ICD-10-CM

## 2025-03-31 DIAGNOSIS — Z13.228 SCREENING FOR ENDOCRINE, NUTRITIONAL, METABOLIC AND IMMUNITY DISORDER: ICD-10-CM

## 2025-03-31 DIAGNOSIS — Z13.220 SCREENING, LIPID: ICD-10-CM

## 2025-03-31 DIAGNOSIS — R11.0 NAUSEA: ICD-10-CM

## 2025-03-31 DIAGNOSIS — G25.81 RESTLESS LEG SYNDROME: ICD-10-CM

## 2025-03-31 DIAGNOSIS — E78.2 MIXED HYPERLIPIDEMIA: ICD-10-CM

## 2025-03-31 DIAGNOSIS — I10 ESSENTIAL HYPERTENSION: ICD-10-CM

## 2025-03-31 DIAGNOSIS — Z13.0 SCREENING FOR ENDOCRINE, NUTRITIONAL, METABOLIC AND IMMUNITY DISORDER: ICD-10-CM

## 2025-03-31 DIAGNOSIS — Z13.21 SCREENING FOR ENDOCRINE, NUTRITIONAL, METABOLIC AND IMMUNITY DISORDER: ICD-10-CM

## 2025-03-31 DIAGNOSIS — I25.118 CORONARY ARTERY DISEASE OF NATIVE ARTERY OF NATIVE HEART WITH STABLE ANGINA PECTORIS: ICD-10-CM

## 2025-03-31 PROBLEM — I25.10 CORONARY ARTERY DISEASE INVOLVING NATIVE CORONARY ARTERY OF NATIVE HEART: Status: ACTIVE | Noted: 2025-03-31

## 2025-03-31 PROBLEM — I25.2 HISTORY OF NON-ST ELEVATION MYOCARDIAL INFARCTION (NSTEMI): Status: ACTIVE | Noted: 2025-03-31

## 2025-03-31 LAB
DEPRECATED RDW RBC AUTO: 45.4 FL (ref 37–54)
ERYTHROCYTE [DISTWIDTH] IN BLOOD BY AUTOMATED COUNT: 14.5 % (ref 12.3–15.4)
HBA1C MFR BLD: 4.9 % (ref 4.8–5.6)
HCT VFR BLD AUTO: 41.8 % (ref 34–46.6)
HGB BLD-MCNC: 14 G/DL (ref 12–15.9)
MCH RBC QN AUTO: 29.4 PG (ref 26.6–33)
MCHC RBC AUTO-ENTMCNC: 33.5 G/DL (ref 31.5–35.7)
MCV RBC AUTO: 87.8 FL (ref 79–97)
PLATELET # BLD AUTO: 331 10*3/MM3 (ref 140–450)
PMV BLD AUTO: 10.2 FL (ref 6–12)
RBC # BLD AUTO: 4.76 10*6/MM3 (ref 3.77–5.28)
WBC NRBC COR # BLD AUTO: 5.07 10*3/MM3 (ref 3.4–10.8)

## 2025-03-31 PROCEDURE — 80053 COMPREHEN METABOLIC PANEL: CPT

## 2025-03-31 PROCEDURE — 85027 COMPLETE CBC AUTOMATED: CPT

## 2025-03-31 PROCEDURE — 80061 LIPID PANEL: CPT

## 2025-03-31 PROCEDURE — 84443 ASSAY THYROID STIM HORMONE: CPT

## 2025-03-31 PROCEDURE — 83036 HEMOGLOBIN GLYCOSYLATED A1C: CPT

## 2025-03-31 RX ORDER — ASPIRIN 81 MG/1
81 TABLET, CHEWABLE ORAL DAILY
Qty: 90 TABLET | Refills: 1 | Status: SHIPPED | OUTPATIENT
Start: 2025-03-31

## 2025-03-31 RX ORDER — EPINEPHRINE 0.3 MG/.3ML
0.3 INJECTION SUBCUTANEOUS ONCE
Qty: 2 EACH | Refills: 1 | Status: SHIPPED | OUTPATIENT
Start: 2025-03-31 | End: 2025-03-31

## 2025-03-31 RX ORDER — PREGABALIN 100 MG/1
100 CAPSULE ORAL NIGHTLY PRN
Qty: 90 CAPSULE | Refills: 1 | Status: SHIPPED | OUTPATIENT
Start: 2025-03-31

## 2025-03-31 RX ORDER — HYDROXYZINE HYDROCHLORIDE 25 MG/1
25 TABLET, FILM COATED ORAL
COMMUNITY

## 2025-03-31 RX ORDER — ATORVASTATIN CALCIUM 80 MG/1
80 TABLET, FILM COATED ORAL NIGHTLY
Qty: 90 TABLET | Refills: 1 | Status: SHIPPED | OUTPATIENT
Start: 2025-03-31

## 2025-03-31 RX ORDER — ZIPRASIDONE HYDROCHLORIDE 20 MG/1
20 CAPSULE ORAL 2 TIMES DAILY WITH MEALS
COMMUNITY

## 2025-03-31 RX ORDER — CARVEDILOL 6.25 MG/1
6.25 TABLET ORAL 2 TIMES DAILY WITH MEALS
Qty: 60 TABLET | Refills: 1 | Status: SHIPPED | OUTPATIENT
Start: 2025-03-31

## 2025-03-31 RX ORDER — PROMETHAZINE HYDROCHLORIDE 25 MG/1
25 TABLET ORAL EVERY 6 HOURS PRN
Qty: 10 TABLET | Refills: 0 | Status: SHIPPED | OUTPATIENT
Start: 2025-03-31

## 2025-03-31 RX ORDER — SACUBITRIL AND VALSARTAN 24; 26 MG/1; MG/1
1 TABLET, FILM COATED ORAL 2 TIMES DAILY
Qty: 60 TABLET | Refills: 0 | Status: SHIPPED | OUTPATIENT
Start: 2025-03-31

## 2025-03-31 RX ORDER — EPLERENONE 25 MG/1
25 TABLET ORAL DAILY
COMMUNITY
Start: 2025-03-21

## 2025-03-31 RX ORDER — PRASUGREL 10 MG/1
10 TABLET, FILM COATED ORAL DAILY
Qty: 90 TABLET | Refills: 1 | Status: SHIPPED | OUTPATIENT
Start: 2025-03-31

## 2025-03-31 NOTE — PATIENT INSTRUCTIONS
Diagnosis Discussed   Continue to monitor   Plenty of fluids, monitor diet and exercise   Labs ordered will notify of results   Immunizations up to date   Follow up with Cardiology   Follow up with psychiatry   Follow up with neurosurgery   Mammogram ordered- will scheduled   EMG- scheduled per neurosurgery   Continue Cardiac Rehab as scheduled   Take medications as instructed- refills today   Follow up as directed   If symptoms worsen or persist please seek further evaluation
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
## 2025-04-01 ENCOUNTER — HOSPITAL ENCOUNTER (OUTPATIENT)
Dept: NEUROLOGY | Facility: HOSPITAL | Age: 45
Discharge: HOME | End: 2025-04-01
Admitting: NEUROLOGICAL SURGERY
Payer: MEDICARE

## 2025-04-01 ENCOUNTER — OFFICE VISIT (OUTPATIENT)
Dept: NEUROSURGERY | Facility: CLINIC | Age: 45
End: 2025-04-01
Payer: MEDICARE

## 2025-04-01 VITALS — TEMPERATURE: 97.1 F | HEIGHT: 70 IN | WEIGHT: 220 LBS | BODY MASS INDEX: 31.5 KG/M2

## 2025-04-01 DIAGNOSIS — M50.30 DDD (DEGENERATIVE DISC DISEASE), CERVICAL: ICD-10-CM

## 2025-04-01 DIAGNOSIS — M79.601 BILATERAL ARM PAIN: ICD-10-CM

## 2025-04-01 DIAGNOSIS — M47.812 CERVICAL SPONDYLOSIS: Primary | ICD-10-CM

## 2025-04-01 DIAGNOSIS — M79.602 BILATERAL ARM PAIN: ICD-10-CM

## 2025-04-01 LAB
ALBUMIN SERPL-MCNC: 4.2 G/DL (ref 3.5–5.2)
ALBUMIN/GLOB SERPL: 1 G/DL
ALP SERPL-CCNC: 105 U/L (ref 39–117)
ALT SERPL W P-5'-P-CCNC: 10 U/L (ref 1–33)
ANION GAP SERPL CALCULATED.3IONS-SCNC: 14 MMOL/L (ref 5–15)
AST SERPL-CCNC: 18 U/L (ref 1–32)
BILIRUB SERPL-MCNC: 0.3 MG/DL (ref 0–1.2)
BUN SERPL-MCNC: 10 MG/DL (ref 6–20)
BUN/CREAT SERPL: 8.6 (ref 7–25)
CALCIUM SPEC-SCNC: 9.9 MG/DL (ref 8.6–10.5)
CHLORIDE SERPL-SCNC: 106 MMOL/L (ref 98–107)
CHOLEST SERPL-MCNC: 172 MG/DL (ref 0–200)
CO2 SERPL-SCNC: 19 MMOL/L (ref 22–29)
CREAT SERPL-MCNC: 1.16 MG/DL (ref 0.57–1)
EGFRCR SERPLBLD CKD-EPI 2021: 59.7 ML/MIN/1.73
GLOBULIN UR ELPH-MCNC: 4.1 GM/DL
GLUCOSE SERPL-MCNC: 90 MG/DL (ref 65–99)
HDLC SERPL-MCNC: 49 MG/DL (ref 40–60)
LDLC SERPL CALC-MCNC: 103 MG/DL (ref 0–100)
LDLC/HDLC SERPL: 2.07 {RATIO}
POTASSIUM SERPL-SCNC: 4.3 MMOL/L (ref 3.5–5.2)
PROT SERPL-MCNC: 8.3 G/DL (ref 6–8.5)
SODIUM SERPL-SCNC: 139 MMOL/L (ref 136–145)
TRIGL SERPL-MCNC: 109 MG/DL (ref 0–150)
TSH SERPL DL<=0.05 MIU/L-ACNC: 1.43 UIU/ML (ref 0.27–4.2)
VLDLC SERPL-MCNC: 20 MG/DL (ref 5–40)

## 2025-04-01 PROCEDURE — 1159F MED LIST DOCD IN RCRD: CPT | Performed by: PHYSICIAN ASSISTANT

## 2025-04-01 PROCEDURE — 95886 MUSC TEST DONE W/N TEST COMP: CPT

## 2025-04-01 PROCEDURE — 99213 OFFICE O/P EST LOW 20 MIN: CPT | Performed by: PHYSICIAN ASSISTANT

## 2025-04-01 PROCEDURE — 1160F RVW MEDS BY RX/DR IN RCRD: CPT | Performed by: PHYSICIAN ASSISTANT

## 2025-04-01 PROCEDURE — 95910 NRV CNDJ TEST 7-8 STUDIES: CPT

## 2025-04-01 RX ORDER — LISINOPRIL 20 MG/1
TABLET ORAL
COMMUNITY
Start: 2025-03-31
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
## 2025-04-21 RX ORDER — VALACYCLOVIR HYDROCHLORIDE 1 G/1
1000 TABLET, FILM COATED ORAL 2 TIMES DAILY PRN
Status: CANCELLED | OUTPATIENT
Start: 2025-04-21

## 2025-04-22 ENCOUNTER — HOSPITAL ENCOUNTER (OUTPATIENT)
Dept: MAMMOGRAPHY | Facility: HOSPITAL | Age: 45
Discharge: HOME | End: 2025-04-22
Admitting: FAMILY MEDICINE
Payer: MEDICARE

## 2025-04-22 DIAGNOSIS — Z12.31 VISIT FOR SCREENING MAMMOGRAM: ICD-10-CM

## 2025-04-22 PROCEDURE — 77067 SCR MAMMO BI INCL CAD: CPT

## 2025-04-22 PROCEDURE — 77063 BREAST TOMOSYNTHESIS BI: CPT

## 2025-04-24 RX ORDER — VALACYCLOVIR HYDROCHLORIDE 1 G/1
1000 TABLET, FILM COATED ORAL 2 TIMES DAILY PRN
Qty: 30 TABLET | Refills: 1 | Status: SHIPPED | OUTPATIENT
Start: 2025-04-24

## 2025-04-28 ENCOUNTER — HOSPITAL ENCOUNTER (OUTPATIENT)
Dept: ULTRASOUND IMAGING | Facility: HOSPITAL | Age: 45
Discharge: HOME | End: 2025-04-28
Payer: MEDICARE

## 2025-04-28 ENCOUNTER — HOSPITAL ENCOUNTER (OUTPATIENT)
Dept: MAMMOGRAPHY | Facility: HOSPITAL | Age: 45
Discharge: HOME | End: 2025-04-28
Payer: MEDICARE

## 2025-04-28 DIAGNOSIS — R92.8 ABNORMAL MAMMOGRAM: ICD-10-CM

## 2025-04-28 PROCEDURE — 76642 ULTRASOUND BREAST LIMITED: CPT | Performed by: RADIOLOGY

## 2025-04-28 PROCEDURE — G0279 TOMOSYNTHESIS, MAMMO: HCPCS

## 2025-04-28 PROCEDURE — 77065 DX MAMMO INCL CAD UNI: CPT | Performed by: RADIOLOGY

## 2025-04-28 PROCEDURE — G0279 TOMOSYNTHESIS, MAMMO: HCPCS | Performed by: RADIOLOGY

## 2025-04-28 PROCEDURE — 77065 DX MAMMO INCL CAD UNI: CPT

## 2025-04-28 PROCEDURE — 76642 ULTRASOUND BREAST LIMITED: CPT

## 2025-05-07 ENCOUNTER — APPOINTMENT (OUTPATIENT)
Dept: GENERAL RADIOLOGY | Facility: HOSPITAL | Age: 45
End: 2025-05-07
Payer: MEDICARE

## 2025-05-07 ENCOUNTER — HOSPITAL ENCOUNTER (INPATIENT)
Facility: HOSPITAL | Age: 45
LOS: 1 days | Discharge: HOME | End: 2025-05-07
Attending: EMERGENCY MEDICINE | Admitting: INTERNAL MEDICINE
Payer: MEDICARE

## 2025-05-07 ENCOUNTER — READMISSION MANAGEMENT (OUTPATIENT)
Dept: CALL CENTER | Facility: HOSPITAL | Age: 45
End: 2025-05-07
Payer: MEDICARE

## 2025-05-07 VITALS
RESPIRATION RATE: 19 BRPM | OXYGEN SATURATION: 99 % | HEART RATE: 68 BPM | SYSTOLIC BLOOD PRESSURE: 123 MMHG | DIASTOLIC BLOOD PRESSURE: 80 MMHG | WEIGHT: 212 LBS | TEMPERATURE: 97.5 F | BODY MASS INDEX: 30.35 KG/M2 | HEIGHT: 70 IN

## 2025-05-07 DIAGNOSIS — I20.0 UNSTABLE ANGINA: Primary | ICD-10-CM

## 2025-05-07 DIAGNOSIS — I24.9 ACUTE CORONARY SYNDROME: ICD-10-CM

## 2025-05-07 DIAGNOSIS — Z86.79 HISTORY OF CORONARY ARTERY DISEASE: ICD-10-CM

## 2025-05-07 LAB
ALBUMIN SERPL-MCNC: 4.2 G/DL (ref 3.5–5.2)
ALBUMIN/GLOB SERPL: 1.4 G/DL
ALP SERPL-CCNC: 103 U/L (ref 39–117)
ALT SERPL W P-5'-P-CCNC: 13 U/L (ref 1–33)
ANION GAP SERPL CALCULATED.3IONS-SCNC: 15 MMOL/L (ref 5–15)
APTT PPP: 22.9 SECONDS (ref 60–90)
AST SERPL-CCNC: 18 U/L (ref 1–32)
BASOPHILS # BLD AUTO: 0.03 10*3/MM3 (ref 0–0.2)
BASOPHILS NFR BLD AUTO: 0.5 % (ref 0–1.5)
BILIRUB SERPL-MCNC: 0.3 MG/DL (ref 0–1.2)
BUN SERPL-MCNC: 16 MG/DL (ref 6–20)
BUN/CREAT SERPL: 12.6 (ref 7–25)
CALCIUM SPEC-SCNC: 8.9 MG/DL (ref 8.6–10.5)
CHLORIDE SERPL-SCNC: 103 MMOL/L (ref 98–107)
CO2 SERPL-SCNC: 19 MMOL/L (ref 22–29)
CREAT SERPL-MCNC: 1.27 MG/DL (ref 0.57–1)
DEPRECATED RDW RBC AUTO: 52.8 FL (ref 37–54)
EGFRCR SERPLBLD CKD-EPI 2021: 53.6 ML/MIN/1.73
EOSINOPHIL # BLD AUTO: 0.19 10*3/MM3 (ref 0–0.4)
EOSINOPHIL NFR BLD AUTO: 3.4 % (ref 0.3–6.2)
ERYTHROCYTE [DISTWIDTH] IN BLOOD BY AUTOMATED COUNT: 15.5 % (ref 12.3–15.4)
GEN 5 1HR TROPONIN T REFLEX: 17 NG/L
GLOBULIN UR ELPH-MCNC: 3.1 GM/DL
GLUCOSE SERPL-MCNC: 122 MG/DL (ref 65–99)
HCT VFR BLD AUTO: 44.6 % (ref 34–46.6)
HGB BLD-MCNC: 13.8 G/DL (ref 12–15.9)
HOLD SPECIMEN: NORMAL
IMM GRANULOCYTES # BLD AUTO: 0.01 10*3/MM3 (ref 0–0.05)
IMM GRANULOCYTES NFR BLD AUTO: 0.2 % (ref 0–0.5)
INR PPP: 0.92 (ref 0.89–1.12)
LIPASE SERPL-CCNC: 30 U/L (ref 13–60)
LYMPHOCYTES # BLD AUTO: 1.31 10*3/MM3 (ref 0.7–3.1)
LYMPHOCYTES NFR BLD AUTO: 23.4 % (ref 19.6–45.3)
MCH RBC QN AUTO: 28.9 PG (ref 26.6–33)
MCHC RBC AUTO-ENTMCNC: 30.9 G/DL (ref 31.5–35.7)
MCV RBC AUTO: 93.3 FL (ref 79–97)
MONOCYTES # BLD AUTO: 0.3 10*3/MM3 (ref 0.1–0.9)
MONOCYTES NFR BLD AUTO: 5.4 % (ref 5–12)
NEUTROPHILS NFR BLD AUTO: 3.75 10*3/MM3 (ref 1.7–7)
NEUTROPHILS NFR BLD AUTO: 67.1 % (ref 42.7–76)
NRBC BLD AUTO-RTO: 0 /100 WBC (ref 0–0.2)
NT-PROBNP SERPL-MCNC: 129.4 PG/ML (ref 0–450)
PLATELET # BLD AUTO: 260 10*3/MM3 (ref 140–450)
PMV BLD AUTO: 9.6 FL (ref 6–12)
POTASSIUM SERPL-SCNC: 3.4 MMOL/L (ref 3.5–5.2)
PROT SERPL-MCNC: 7.3 G/DL (ref 6–8.5)
PROTHROMBIN TIME: 12.9 SECONDS (ref 12.2–15.3)
QT INTERVAL: 406 MS
QT INTERVAL: 420 MS
QTC INTERVAL: 412 MS
QTC INTERVAL: 443 MS
RBC # BLD AUTO: 4.78 10*6/MM3 (ref 3.77–5.28)
SODIUM SERPL-SCNC: 137 MMOL/L (ref 136–145)
TROPONIN T NUMERIC DELTA: 7 NG/L
TROPONIN T SERPL HS-MCNC: 10 NG/L
TROPONIN T SERPL HS-MCNC: 19 NG/L
UFH PPP CHRO-ACNC: 0.1 IU/ML (ref 0.3–0.7)
UFH PPP CHRO-ACNC: 0.44 IU/ML (ref 0.3–0.7)
WBC NRBC COR # BLD AUTO: 5.59 10*3/MM3 (ref 3.4–10.8)
WHOLE BLOOD HOLD COAG: NORMAL
WHOLE BLOOD HOLD SPECIMEN: NORMAL

## 2025-05-07 PROCEDURE — 84484 ASSAY OF TROPONIN QUANT: CPT | Performed by: INTERNAL MEDICINE

## 2025-05-07 PROCEDURE — 85520 HEPARIN ASSAY: CPT | Performed by: EMERGENCY MEDICINE

## 2025-05-07 PROCEDURE — 25010000002 FENTANYL CITRATE (PF) 50 MCG/ML SOLUTION: Performed by: INTERNAL MEDICINE

## 2025-05-07 PROCEDURE — 93005 ELECTROCARDIOGRAM TRACING: CPT

## 2025-05-07 PROCEDURE — 85025 COMPLETE CBC W/AUTO DIFF WBC: CPT | Performed by: EMERGENCY MEDICINE

## 2025-05-07 PROCEDURE — 25510000001 IOPAMIDOL PER 1 ML: Performed by: INTERNAL MEDICINE

## 2025-05-07 PROCEDURE — 25010000002 PROMETHAZINE PER 50 MG: Performed by: INTERNAL MEDICINE

## 2025-05-07 PROCEDURE — 99239 HOSP IP/OBS DSCHRG MGMT >30: CPT | Performed by: INTERNAL MEDICINE

## 2025-05-07 PROCEDURE — 4A023N7 MEASUREMENT OF CARDIAC SAMPLING AND PRESSURE, LEFT HEART, PERCUTANEOUS APPROACH: ICD-10-PCS | Performed by: INTERNAL MEDICINE

## 2025-05-07 PROCEDURE — 25010000002 HEPARIN (PORCINE) PER 1000 UNITS: Performed by: INTERNAL MEDICINE

## 2025-05-07 PROCEDURE — 84484 ASSAY OF TROPONIN QUANT: CPT | Performed by: EMERGENCY MEDICINE

## 2025-05-07 PROCEDURE — 83880 ASSAY OF NATRIURETIC PEPTIDE: CPT | Performed by: EMERGENCY MEDICINE

## 2025-05-07 PROCEDURE — 25010000002 MIDAZOLAM PER 1 MG: Performed by: INTERNAL MEDICINE

## 2025-05-07 PROCEDURE — 83690 ASSAY OF LIPASE: CPT | Performed by: EMERGENCY MEDICINE

## 2025-05-07 PROCEDURE — 99285 EMERGENCY DEPT VISIT HI MDM: CPT

## 2025-05-07 PROCEDURE — 25010000002 NICARDIPINE 2.5 MG/ML SOLUTION: Performed by: INTERNAL MEDICINE

## 2025-05-07 PROCEDURE — 25010000002 HEPARIN (PORCINE) 25000-0.45 UT/250ML-% SOLUTION: Performed by: EMERGENCY MEDICINE

## 2025-05-07 PROCEDURE — 93005 ELECTROCARDIOGRAM TRACING: CPT | Performed by: EMERGENCY MEDICINE

## 2025-05-07 PROCEDURE — 80053 COMPREHEN METABOLIC PANEL: CPT | Performed by: EMERGENCY MEDICINE

## 2025-05-07 PROCEDURE — 36415 COLL VENOUS BLD VENIPUNCTURE: CPT

## 2025-05-07 PROCEDURE — 25810000003 SODIUM CHLORIDE 0.9 % SOLUTION: Performed by: INTERNAL MEDICINE

## 2025-05-07 PROCEDURE — 85610 PROTHROMBIN TIME: CPT | Performed by: EMERGENCY MEDICINE

## 2025-05-07 PROCEDURE — 93458 L HRT ARTERY/VENTRICLE ANGIO: CPT | Performed by: INTERNAL MEDICINE

## 2025-05-07 PROCEDURE — B2111ZZ FLUOROSCOPY OF MULTIPLE CORONARY ARTERIES USING LOW OSMOLAR CONTRAST: ICD-10-PCS | Performed by: INTERNAL MEDICINE

## 2025-05-07 PROCEDURE — 85520 HEPARIN ASSAY: CPT

## 2025-05-07 PROCEDURE — 94640 AIRWAY INHALATION TREATMENT: CPT

## 2025-05-07 PROCEDURE — C1894 INTRO/SHEATH, NON-LASER: HCPCS | Performed by: INTERNAL MEDICINE

## 2025-05-07 PROCEDURE — C1769 GUIDE WIRE: HCPCS | Performed by: INTERNAL MEDICINE

## 2025-05-07 PROCEDURE — B2151ZZ FLUOROSCOPY OF LEFT HEART USING LOW OSMOLAR CONTRAST: ICD-10-PCS | Performed by: INTERNAL MEDICINE

## 2025-05-07 PROCEDURE — 71045 X-RAY EXAM CHEST 1 VIEW: CPT

## 2025-05-07 PROCEDURE — 99222 1ST HOSP IP/OBS MODERATE 55: CPT | Performed by: INTERNAL MEDICINE

## 2025-05-07 PROCEDURE — 85730 THROMBOPLASTIN TIME PARTIAL: CPT | Performed by: EMERGENCY MEDICINE

## 2025-05-07 PROCEDURE — 25010000002 LIDOCAINE PF 1% 1 % SOLUTION: Performed by: INTERNAL MEDICINE

## 2025-05-07 PROCEDURE — 25010000002 HEPARIN (PORCINE) PER 1000 UNITS: Performed by: EMERGENCY MEDICINE

## 2025-05-07 RX ORDER — TOPIRAMATE 200 MG/1
200 TABLET, FILM COATED ORAL 2 TIMES DAILY
Start: 2025-05-07

## 2025-05-07 RX ORDER — SODIUM CHLORIDE 9 MG/ML
1 INJECTION, SOLUTION INTRAVENOUS CONTINUOUS
Status: DISCONTINUED | OUTPATIENT
Start: 2025-05-07 | End: 2025-05-07

## 2025-05-07 RX ORDER — IOPAMIDOL 755 MG/ML
INJECTION, SOLUTION INTRAVASCULAR
Status: DISCONTINUED | OUTPATIENT
Start: 2025-05-07 | End: 2025-05-07 | Stop reason: HOSPADM

## 2025-05-07 RX ORDER — HEPARIN SODIUM 1000 [USP'U]/ML
2000 INJECTION, SOLUTION INTRAVENOUS; SUBCUTANEOUS AS NEEDED
Status: DISCONTINUED | OUTPATIENT
Start: 2025-05-07 | End: 2025-05-07 | Stop reason: SDUPTHER

## 2025-05-07 RX ORDER — TOPIRAMATE 100 MG/1
200 TABLET, FILM COATED ORAL EVERY 12 HOURS SCHEDULED
Status: DISCONTINUED | OUTPATIENT
Start: 2025-05-07 | End: 2025-05-07 | Stop reason: HOSPADM

## 2025-05-07 RX ORDER — ATORVASTATIN CALCIUM 40 MG/1
80 TABLET, FILM COATED ORAL NIGHTLY
Status: DISCONTINUED | OUTPATIENT
Start: 2025-05-07 | End: 2025-05-07 | Stop reason: HOSPADM

## 2025-05-07 RX ORDER — MIDAZOLAM HYDROCHLORIDE 1 MG/ML
INJECTION, SOLUTION INTRAMUSCULAR; INTRAVENOUS
Status: DISCONTINUED | OUTPATIENT
Start: 2025-05-07 | End: 2025-05-07 | Stop reason: HOSPADM

## 2025-05-07 RX ORDER — HEPARIN SODIUM 1000 [USP'U]/ML
4000 INJECTION, SOLUTION INTRAVENOUS; SUBCUTANEOUS ONCE
Status: COMPLETED | OUTPATIENT
Start: 2025-05-07 | End: 2025-05-07

## 2025-05-07 RX ORDER — FENTANYL CITRATE 50 UG/ML
INJECTION, SOLUTION INTRAMUSCULAR; INTRAVENOUS
Status: DISCONTINUED | OUTPATIENT
Start: 2025-05-07 | End: 2025-05-07 | Stop reason: HOSPADM

## 2025-05-07 RX ORDER — NITROGLYCERIN 0.4 MG/1
0.4 TABLET SUBLINGUAL
Status: DISCONTINUED | OUTPATIENT
Start: 2025-05-07 | End: 2025-05-07 | Stop reason: HOSPADM

## 2025-05-07 RX ORDER — PRASUGREL 10 MG/1
10 TABLET, FILM COATED ORAL DAILY
Status: DISCONTINUED | OUTPATIENT
Start: 2025-05-08 | End: 2025-05-07 | Stop reason: HOSPADM

## 2025-05-07 RX ORDER — LIDOCAINE HYDROCHLORIDE 10 MG/ML
INJECTION, SOLUTION EPIDURAL; INFILTRATION; INTRACAUDAL; PERINEURAL
Status: DISCONTINUED | OUTPATIENT
Start: 2025-05-07 | End: 2025-05-07 | Stop reason: HOSPADM

## 2025-05-07 RX ORDER — SODIUM CHLORIDE 0.9 % (FLUSH) 0.9 %
10 SYRINGE (ML) INJECTION AS NEEDED
Status: DISCONTINUED | OUTPATIENT
Start: 2025-05-07 | End: 2025-05-07 | Stop reason: HOSPADM

## 2025-05-07 RX ORDER — ISOSORBIDE MONONITRATE 30 MG/1
30 TABLET, EXTENDED RELEASE ORAL EVERY MORNING
Qty: 90 TABLET | Refills: 3 | Status: SHIPPED | OUTPATIENT
Start: 2025-05-07

## 2025-05-07 RX ORDER — ASPIRIN 81 MG/1
324 TABLET, CHEWABLE ORAL ONCE
Status: COMPLETED | OUTPATIENT
Start: 2025-05-07 | End: 2025-05-07

## 2025-05-07 RX ORDER — SODIUM CHLORIDE 450 MG/100ML
75 INJECTION, SOLUTION INTRAVENOUS CONTINUOUS
Status: DISCONTINUED | OUTPATIENT
Start: 2025-05-07 | End: 2025-05-07

## 2025-05-07 RX ORDER — ZIPRASIDONE HYDROCHLORIDE 20 MG/1
20 CAPSULE ORAL 2 TIMES DAILY WITH MEALS
Status: DISCONTINUED | OUTPATIENT
Start: 2025-05-07 | End: 2025-05-07 | Stop reason: HOSPADM

## 2025-05-07 RX ORDER — HEPARIN SODIUM 1000 [USP'U]/ML
4000 INJECTION, SOLUTION INTRAVENOUS; SUBCUTANEOUS AS NEEDED
Status: DISCONTINUED | OUTPATIENT
Start: 2025-05-07 | End: 2025-05-07 | Stop reason: SDUPTHER

## 2025-05-07 RX ORDER — HEPARIN SODIUM 10000 [USP'U]/100ML
10.5 INJECTION, SOLUTION INTRAVENOUS
Status: DISCONTINUED | OUTPATIENT
Start: 2025-05-07 | End: 2025-05-07

## 2025-05-07 RX ORDER — HEPARIN SODIUM 1000 [USP'U]/ML
INJECTION, SOLUTION INTRAVENOUS; SUBCUTANEOUS
Status: DISCONTINUED | OUTPATIENT
Start: 2025-05-07 | End: 2025-05-07 | Stop reason: HOSPADM

## 2025-05-07 RX ORDER — ALBUTEROL SULFATE 0.83 MG/ML
2.5 SOLUTION RESPIRATORY (INHALATION)
Status: DISCONTINUED | OUTPATIENT
Start: 2025-05-07 | End: 2025-05-07 | Stop reason: HOSPADM

## 2025-05-07 RX ORDER — PANTOPRAZOLE SODIUM 40 MG/1
40 TABLET, DELAYED RELEASE ORAL
Status: DISCONTINUED | OUTPATIENT
Start: 2025-05-08 | End: 2025-05-07 | Stop reason: HOSPADM

## 2025-05-07 RX ADMIN — NITROGLYCERIN 1 INCH: 20 OINTMENT TOPICAL at 09:35

## 2025-05-07 RX ADMIN — SODIUM CHLORIDE 75 ML/HR: 450 INJECTION, SOLUTION INTRAVENOUS at 12:25

## 2025-05-07 RX ADMIN — ALBUTEROL SULFATE 2.5 MG: 2.5 SOLUTION RESPIRATORY (INHALATION) at 13:08

## 2025-05-07 RX ADMIN — NITROGLYCERIN 1 INCH: 20 OINTMENT TOPICAL at 12:18

## 2025-05-07 RX ADMIN — HEPARIN SODIUM 4000 UNITS: 1000 INJECTION INTRAVENOUS; SUBCUTANEOUS at 09:49

## 2025-05-07 RX ADMIN — ASPIRIN 324 MG: 81 TABLET, CHEWABLE ORAL at 09:31

## 2025-05-07 RX ADMIN — HEPARIN SODIUM 10.5 UNITS/KG/HR: 10000 INJECTION, SOLUTION INTRAVENOUS at 09:47

## 2025-05-07 RX ADMIN — PROMETHAZINE HYDROCHLORIDE 12.5 MG: 25 INJECTION INTRAMUSCULAR; INTRAVENOUS at 14:47

## 2025-05-07 NOTE — PROGRESS NOTES
Pharmacy to Dose Heparin Infusion Note    Bebe Rincon is a 45 y.o. female receiving heparin infusion.     Therapy for (VTE/Cardiac): cardiac  Patient Weight: 96.2 kg  Initial Bolus (Y/N): Yes   Any Bolus (Y/N): Yes       Signs or Symptoms of Bleeding: none currently, but had recent bleeding with previous xarelto which was discontinued on 5/1    Cardiac or Other (Not VTE)   Initial Bolus: 60 units/kg (Max 4,000 units)  Initial rate: 12 units/kg/hr (Max 1,000 units/hr)   aPTT  Bolus   Dose Infusion Hold   Time Infusion Rate Change (units/kg/hr) Repeat   aPTT   <45 50 units/kg  (4000 units Max) None Increase by  3 units/kg/hr 6 hours   45 - 52 25 units/kg  (2000 units Max) None Increase by  2 units/kg/hr 6 hours   53 - 59 0 None Increase by  1 units/kg/hr 6 hours   60 - 75 0 None No Change 6 hours (after 2 consecutive levels in range check qAM)   76 - 83 0 None Decrease by  1 units/kg/hr 6 hours   84 - 98 0 30 minutes Decrease by  2 units/kg/hr 6 hours   99 - 113 0 60 minutes Decrease by  3 units/kg/hr 6 hours   >113 0 Hold  After aPTT less than 75 decrease previous rate by 4 units/kg/hr  Every 2 hours until aPTT less than 75 then when infusion restarts in 6 hours     Results from last 7 days   Lab Units 05/07/25  0846   HEMOGLOBIN g/dL 13.8   HEMATOCRIT % 44.6   PLATELETS 10*3/mm3 260       Date   Time   aPTT Current Rate (units/kg/hr) Bolus   (units) Rate Change   (units/kg/hr) New Rate (units/kg/hr) Repeat   aPTT Comments /  Pump Check   5/7 0846 New start 0 4000 +10.5 10.5 1500 Terrence RN. Patient had Rx for xarelto prescribed in March and looks to have been discontinued on 5/1 with recent office visit. Patient stated not taking any past 72 hours, but will look at both xa and aptt just in case. Dw  provider who agreed.                                                                                                                                                                                                                                     Milton Hwang, [facility]  5/7/2025  09:05 EDT
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
## 2025-05-07 NOTE — CASE MANAGEMENT/SOCIAL WORK
Discharge Planning Assessment  [facility]     Patient Name: Bebe Rincon  MRN: [id number]  Today's Date: 5/7/2025    Admit Date: 5/7/2025    Plan: IDP   Discharge Needs Assessment       Row Name 05/07/25 1114       Living Environment    People in Home parent(s)    Current Living Arrangements home    Primary Care Provided by self    Quality of Family Relationships helpful    Able to Return to Prior Arrangements yes       Transition Planning    Patient/Family Anticipates Transition to home    Transportation Anticipated family or friend will provide       Discharge Needs Assessment    Readmission Within the Last 30 Days no previous admission in last 30 days    Equipment Currently Used at Home none    Concerns to be Addressed denies needs/concerns at this time                   Discharge Plan       Row Name 05/07/25 1115       Plan    Plan IDP    Plan Comments MSW met with Pt and Pt’s mother at bedside to complete IDP. Pt lives with mother in [facility]. Pt’s PCP is Dr. Card and Pt has [employer] Medicare and [place] Medicaid insurance coverage. Pt independent with ADLs; Pt reports no DME, HH, or home O2. Pt reports cardiac rehab at [facility]. Pt still drives, and family can transport when medically ready. Pt denied needs or concerns. CM will continue to follow.                       Demographic Summary       Row Name 05/07/25 1114       General Information    Arrived From home    Referral Source admission list;emergency department    Reason for Consult discharge planning    Preferred Language English                   Functional Status       Row Name 05/07/25 1114       Functional Status    Usual Activity Tolerance good    Current Activity Tolerance good       Functional Status, IADL    Medications independent    Meal Preparation independent    Housekeeping independent    Laundry independent    Shopping independent                               DINORA Joseph

## 2025-05-08 ENCOUNTER — TELEPHONE (OUTPATIENT)
Dept: CARDIOLOGY | Facility: CLINIC | Age: 45
End: 2025-05-08
Payer: MEDICARE

## 2025-05-08 ENCOUNTER — TRANSITIONAL CARE MANAGEMENT TELEPHONE ENCOUNTER (OUTPATIENT)
Dept: CALL CENTER | Facility: HOSPITAL | Age: 45
End: 2025-05-08
Payer: MEDICARE

## 2025-05-08 NOTE — PAYOR COMM NOTE
"Ref# [id number]   Discharge Summary    MONICA Wolfe, RN  Utilization Review  Phone [phone]  Fax [phone]    [facility]  [address] [place] [address]         MckenzieAnkit (45 y.o. Female)       Date of Birth   1980    Social Security Number       Address   [address] [facility] [address]    Home Phone   [phone]    MRN   [id number]       Hindu   None    Marital Status                               Admission Date   5/7/2025    Admission Type   Emergency    Admitting Provider   Cahrlie Springer MD    Attending Provider       Department, Room/Bed   [address], S[address]/1       Discharge Date   5/7/2025    Discharge Disposition   Home or Self Care    Discharge Destination                                 Attending Provider: (none)   Allergies: Lamictal [Lamotrigine], Rocephin [Ceftriaxone], Tetracyclines & Related, Amlodipine, Ondansetron, Tramadol    Isolation: None   Infection: None   Code Status: Prior    Ht: 177.8 cm (70\")   Wt: 96.2 kg (212 lb)    Admission Cmt: None   Principal Problem: Unstable angina [I20.0]                   Active Insurance as of 5/7/2025       Primary Coverage       Payor Plan Insurance Group Employer/Plan Group    AETNA MEDICARE REPLACEMENT AETNA MEDICARE ADVANTAGE [id number]       Payor Plan Address Payor Plan Phone Number Payor Plan Fax Number Effective Dates    [address] [phone]  11/1/2022 - None Entered    [place] [address]         Subscriber Name Subscriber Birth Date Member ID       ANKIT ABRAMS 1980 [id number]               Secondary Coverage       Payor Plan Insurance Group Employer/Plan Group    [place]        Payor Plan Address Payor Plan Phone Number Payor Plan Fax Number Effective Dates    [address] [phone]  2/1/2018 - None Entered    [facility] [id number]         Subscriber Name Subscriber Birth Date Member ID "       ANKIT RINCON 1980 [id number]                     Emergency Contacts        (Rel.) Home Phone Work Phone Mobile Phone    Denise Rincon (Mother) [phone] -- [phone]                 Discharge Summary        Charlie Shaffer MD at 05/07/25 1624            Date of Discharge:  5/7/2025    Discharge Diagnosis: Chest pain, CAD    Presenting Problem/History of Present Illness  Unstable angina [I20.0]    Chest pain at rest, history of CAD, hypertension, hyperlipidemia    Hospital Course  Patient is a 45 y.o. female presented with ongoing chest pain, substernal pressure, abnormal EKG, history of PCI of the LAD and diagonal at  2 months prior to admission, given her history of ongoing chest pain we elected for cardiac catheterization.  Cardiac cath performed on 5/7/2025 showed widely patent LAD and diagonal stents with a very small first diagonal which was jailed had ostial 90% stenosis but too small for intervention, she had a distal LAD 40-50% lesion that again was too small for intervention and unlikely to be causing her ongoing chest pain at rest.  Her cardiac enzymes, high-sensitivity troponin was 17 and 19 which is nonspecific and not in the range of type I MI.  I had a long discussion with the patient and her mother at the bedside, regarding her coronary anatomy and possible causes of her chest pain which include microvascular disease, vasospasm, or esophageal problems.  I prescribed Imdur 30 mg daily in addition to her other cardiac medications and she will continue aspirin and prasugrel uninterrupted.  She will be discharged today, she will follow-up with her primary cardiologist in 3-4 weeks, she should also follow-up with her PCP within 2 weeks    Procedures Performed  Procedure(s):  Coronary angiography       No results found for this or any previous visit.       Consults:   Consults       Date and Time Order Name Status Description    5/7/2025  9:20 AM Inpatient  "Cardiology Consult Completed             Pertinent Test Results:   Results for orders placed during the hospital encounter of 11/17/24    Adult Transthoracic Echo Complete W/ Cont if Necessary Per Protocol    Interpretation Summary    Left ventricular systolic function is normal. Calculated left ventricular EF = 60.7% Left ventricular ejection fraction appears to be 61 - 65%.    Left ventricular diastolic function was normal.        Condition on Discharge: Stable    Physical Exam at Discharge    Vital Signs  /76   Pulse 68   Temp 97.8 °F (36.6 °C) (Oral)   Resp 19   Ht 177.8 cm (70\")   Wt 96.2 kg (212 lb)   LMP  (LMP Unknown) Comment: NO PAP SINCE HYSTERECTOMY  SpO2 99%   BMI 30.42 kg/m²       Physical Exam:  General Appearance:   · well developed  · well nourished  HENT:   · oropharynx moist  · lips not cyanotic  Neck:  · thyroid not enlarged  · supple  Respiratory:  · no respiratory distress  · normal breath sounds  · no rales  Cardiovascular:  · no jugular venous distention  · regular rhythm  · apical impulse normal  · S1 normal, S2 normal  · no S3, no S4   · no murmur  · no rub, no thrill  · carotid pulses normal; no bruit  · pedal pulses normal  · lower extremity edema: none    Gastrointestinal:   · bowel sounds normal  · non-tender  · no hepatomegaly, no splenomegaly  Musculoskeletal:  · no clubbing of fingers.   · normocephalic, head atraumatic  Skin:   · warm  · dry  Psychiatric:  · judgement and insight appropriate  · normal mood and affect      Discharge Disposition  Home or Self Care    Discharge Medications     Discharge Medications        New Medications        Instructions Start Date   isosorbide mononitrate 30 MG 24 hr tablet  Commonly known as: IMDUR   30 mg, Oral, Every Morning             Continue These Medications        Instructions Start Date   aspirin 81 MG chewable tablet   81 mg, Oral, Daily      atorvastatin 80 MG tablet  Commonly known as: LIPITOR   80 mg, Oral, Nightly    "   buPROPion  MG 24 hr tablet  Commonly known as: WELLBUTRIN XL   150 mg, Daily      buPROPion  MG 24 hr tablet  Commonly known as: WELLBUTRIN XL   300 mg, Daily      carvedilol 6.25 MG tablet  Commonly known as: COREG   6.25 mg, Oral, 2 Times Daily With Meals      Entresto 24-26 MG tablet  Generic drug: sacubitril-valsartan   1 tablet, Oral, 2 Times Daily      eplerenone 25 MG tablet  Commonly known as: INSPRA   25 mg, Daily      lansoprazole 30 MG capsule  Commonly known as: PREVACID   1 capsule, Every 12 Hours Scheduled      nitroglycerin 0.4 MG SL tablet  Commonly known as: NITROSTAT   0.4 mg, Every 5 Minutes PRN      prasugrel 10 MG tablet  Commonly known as: EFFIENT   10 mg, Oral, Daily      pregabalin 100 MG capsule  Commonly known as: Lyrica   100 mg, Oral, Nightly PRN      ProAir  (90 Base) MCG/ACT inhaler  Generic drug: albuterol sulfate HFA   2 puffs, Every 6 Hours PRN      promethazine 25 MG tablet  Commonly known as: PHENERGAN   25 mg, Oral, Every 6 Hours PRN      topiramate 200 MG tablet  Commonly known as: TOPAMAX   200 mg, Oral, 2 Times Daily      valACYclovir 1000 MG tablet  Commonly known as: Valtrex   1,000 mg, Oral, 2 Times Daily PRN             Stop These Medications      FLUoxetine 40 MG capsule  Commonly known as: PROzac     Fluticasone-Salmeterol 100-50 MCG/ACT DISKUS  Commonly known as: ADVAIR/WIXELA     Geodon 20 MG capsule  Generic drug: ziprasidone     hydrOXYzine 25 MG tablet  Commonly known as: ATARAX     lisinopril 20 MG tablet  Commonly known as: PRINIVIL,ZESTRIL              Discharge Diet: Cardiac    Activity at Discharge: As tolerated    Follow-up Appointments  Future Appointments   Date Time Provider Department Center   6/30/2025 10:15 AM America Card MD [facility]   4/24/2026  9:45 AM America Card MD [facility]         Test Results Pending at Discharge        Time: I spent  38  minutes on this discharge activity which included: face-to-face  encounter with the patient, reviewing the data in the system, coordination of the care with the nursing staff as well as consultants, documentation, and entering orders.       Charlie Jason MD  05/07/25  16:24 EDT              Electronically signed by Charlie Jason MD at 05/07/25 1632       Discharge Order (From admission, onward)       Start     Ordered    05/07/25 1620  Discharge patient  Once        Expected Discharge Date: 05/07/25   Discharge Disposition: Home or Self Care   Physician of Record for Attribution - Please select from Treatment Team: CHARLIE JASON [[id number]]   Review needed by CMO to determine Physician of Record: No      Question Answer Comment   Physician of Record for Attribution - Please select from Treatment Team CHARLIE JASON    Review needed by CMO to determine Physician of Record No        05/07/25 5564

## 2025-05-08 NOTE — OUTREACH NOTE
Prep Survey      Flowsheet Row Responses   [facility] patient discharged from? [place]   Is LACE score < 7 ? Yes   Eligibility [facility]   Date of Admission 05/07/25   Date of Discharge 05/07/25   Discharge Disposition Home or Self Care   Discharge diagnosis Chest pain   Does the patient have one of the following disease processes/diagnoses(primary or secondary)? Other   Does the patient have Home health ordered? No   Is there a DME ordered? No   Prep survey completed? Yes            EMILEE CHEW - Registered Nurse

## 2025-05-08 NOTE — OUTREACH NOTE
Call Center TCM Note      Flowsheet Row Responses   [facility] patient discharged from? [place]   Does the patient have one of the following disease processes/diagnoses(primary or secondary)? Other   TCM attempt successful? Yes   Call start time 0846   Call end time 0850   Discharge diagnosis Chest pain   Meds reviewed with patient/caregiver? Yes   Is the patient having any side effects they believe may be caused by any medication additions or changes? No   Does the patient have all medications ordered at discharge? Yes   Is the patient taking all medications as directed (includes completed medication regime)? Yes   Does the patient have an appointment with their PCP within 7-14 days of discharge? No   Nursing Interventions Patient declined scheduling/rescheduling appointment at this time   Has home health visited the patient within 72 hours of discharge? N/A   Psychosocial issues? No   Comments pt monitors b/p and weight daily   Did the patient receive a copy of their discharge instructions? Yes   Nursing interventions Reviewed instructions with patient   What is the patient's perception of their health status since discharge? Improving  [feels tired and sore]   Is the patient/caregiver able to teach back the hierarchy of who to call/visit for symptoms/problems? PCP, Specialist, Home health nurse, Urgent Care, ED, 911 Yes   TCM call completed? Yes   Call end time 0850   Would this patient benefit from a Referral to Amb Social Work? No   Is the patient interested in additional calls from an ambulatory ? No            Eli MENCHACA - Registered Nurse    5/8/2025, 08:50 EDT

## 2025-05-13 LAB
QT INTERVAL: 406 MS
QT INTERVAL: 420 MS
QTC INTERVAL: 412 MS
QTC INTERVAL: 443 MS

## 2025-06-30 ENCOUNTER — OFFICE VISIT (OUTPATIENT)
Dept: INTERNAL MEDICINE | Facility: CLINIC | Age: 45
End: 2025-06-30
Payer: MEDICARE

## 2025-06-30 VITALS
HEART RATE: 96 BPM | DIASTOLIC BLOOD PRESSURE: 98 MMHG | RESPIRATION RATE: 16 BRPM | BODY MASS INDEX: 29.63 KG/M2 | OXYGEN SATURATION: 94 % | HEIGHT: 70 IN | WEIGHT: 207 LBS | SYSTOLIC BLOOD PRESSURE: 158 MMHG

## 2025-06-30 DIAGNOSIS — I1A.0 RESISTANT HYPERTENSION: Primary | Chronic | ICD-10-CM

## 2025-06-30 DIAGNOSIS — I50.9 CHRONIC CONGESTIVE HEART FAILURE, UNSPECIFIED HEART FAILURE TYPE: ICD-10-CM

## 2025-06-30 DIAGNOSIS — I10 ELEVATED HEART RATE WITH ELEVATED BLOOD PRESSURE AND DIAGNOSIS OF HYPERTENSION: ICD-10-CM

## 2025-06-30 DIAGNOSIS — R00.9 ELEVATED HEART RATE WITH ELEVATED BLOOD PRESSURE AND DIAGNOSIS OF HYPERTENSION: ICD-10-CM

## 2025-06-30 DIAGNOSIS — M54.12 CERVICAL RADICULOPATHY: ICD-10-CM

## 2025-06-30 DIAGNOSIS — R11.0 NAUSEA: ICD-10-CM

## 2025-06-30 DIAGNOSIS — I25.112 CORONARY ARTERY DISEASE INVOLVING NATIVE CORONARY ARTERY OF NATIVE HEART WITH REFRACTORY ANGINA PECTORIS: ICD-10-CM

## 2025-06-30 DIAGNOSIS — K21.9 GASTROESOPHAGEAL REFLUX DISEASE WITHOUT ESOPHAGITIS: ICD-10-CM

## 2025-06-30 PROBLEM — G89.29 CHRONIC PAIN: Status: ACTIVE | Noted: 2025-04-18

## 2025-06-30 PROCEDURE — 1126F AMNT PAIN NOTED NONE PRSNT: CPT | Performed by: FAMILY MEDICINE

## 2025-06-30 PROCEDURE — 1159F MED LIST DOCD IN RCRD: CPT | Performed by: FAMILY MEDICINE

## 2025-06-30 PROCEDURE — 1160F RVW MEDS BY RX/DR IN RCRD: CPT | Performed by: FAMILY MEDICINE

## 2025-06-30 PROCEDURE — 3077F SYST BP >= 140 MM HG: CPT | Performed by: FAMILY MEDICINE

## 2025-06-30 PROCEDURE — 3080F DIAST BP >= 90 MM HG: CPT | Performed by: FAMILY MEDICINE

## 2025-06-30 PROCEDURE — G2211 COMPLEX E/M VISIT ADD ON: HCPCS | Performed by: FAMILY MEDICINE

## 2025-06-30 PROCEDURE — 99214 OFFICE O/P EST MOD 30 MIN: CPT | Performed by: FAMILY MEDICINE

## 2025-06-30 RX ORDER — LANSOPRAZOLE 30 MG/1
30 CAPSULE, DELAYED RELEASE ORAL EVERY 12 HOURS SCHEDULED
Qty: 90 CAPSULE | Refills: 1 | Status: SHIPPED | OUTPATIENT
Start: 2025-06-30

## 2025-06-30 RX ORDER — EMPAGLIFLOZIN 10 MG/1
10 TABLET, FILM COATED ORAL DAILY
COMMUNITY
Start: 2025-04-23

## 2025-06-30 RX ORDER — FUROSEMIDE 40 MG/1
40 TABLET ORAL DAILY
COMMUNITY
Start: 2025-04-23

## 2025-06-30 RX ORDER — METOPROLOL SUCCINATE 50 MG/1
TABLET, EXTENDED RELEASE ORAL
COMMUNITY
Start: 2025-06-03

## 2025-06-30 RX ORDER — SACUBITRIL AND VALSARTAN 97; 103 MG/1; MG/1
1 TABLET, FILM COATED ORAL 2 TIMES DAILY
COMMUNITY
Start: 2025-05-21

## 2025-06-30 RX ORDER — RANOLAZINE 500 MG/1
500 TABLET, EXTENDED RELEASE ORAL
COMMUNITY
Start: 2025-05-21

## 2025-06-30 RX ORDER — PROMETHAZINE HYDROCHLORIDE 25 MG/1
25 TABLET ORAL EVERY 6 HOURS PRN
Qty: 30 TABLET | Refills: 1 | Status: SHIPPED | OUTPATIENT
Start: 2025-06-30

## 2025-06-30 RX ORDER — EPINEPHRINE 0.3 MG/.3ML
INJECTION SUBCUTANEOUS
COMMUNITY

## 2025-06-30 NOTE — PATIENT INSTRUCTIONS
Diagnosis Discussed   Continue to monitor   Plenty of fluids, monitor diet and exercise   Follow up Cardiology as directed   Consider daily low dose diuretic   Refills today   If symptoms worsen or persist please seek further evaluation
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
## 2025-08-19 ENCOUNTER — TELEPHONE (OUTPATIENT)
Dept: INTERNAL MEDICINE | Facility: CLINIC | Age: 45
End: 2025-08-19
Payer: MEDICARE

## 2025-08-20 RX ORDER — LIDOCAINE 50 MG/G
1 PATCH TOPICAL EVERY 24 HOURS
Qty: 30 EACH | Refills: 0 | Status: SHIPPED | OUTPATIENT
Start: 2025-08-20

## 2025-08-21 ENCOUNTER — PRIOR AUTHORIZATION (OUTPATIENT)
Dept: INTERNAL MEDICINE | Facility: CLINIC | Age: 45
End: 2025-08-21
Payer: MEDICARE

## (undated) DEVICE — INTRO ACCSR BLNT TP

## (undated) DEVICE — PK CATH CARD 10

## (undated) DEVICE — SOL IRR H2O BO 1000ML STRL

## (undated) DEVICE — AIR/WATER CLEANING VALVES: Brand: AIR/WATER CLEANING VALVES

## (undated) DEVICE — TR BAND RADIAL ARTERY COMPRESSION DEVICE: Brand: TR BAND

## (undated) DEVICE — MODEL AT P65, P/N 701554-001KIT CONTENTS: HAND CONTROLLER, 3-WAY HIGH-PRESSURE STOPCOCK WITH ROTATING END AND PREMIUM HIGH-PRESSURE TUBING: Brand: ANGIOTOUCH® KIT

## (undated) DEVICE — THE BITE BLOCK MAXI, LATEX FREE STRAP IS USED TO PROTECT THE ENDOSCOPE INSERTION TUBE FROM BEING BITTEN BY THE PATIENT.

## (undated) DEVICE — SINGLE-USE BIOPSY FORCEPS: Brand: RADIAL JAW 4

## (undated) DEVICE — ADULT, W/LG. BACK PAD, RADIOTRANSPARENT ELEMENT AND LEAD WIRE COMPATIBLE W/: Brand: DEFIBRILLATION ELECTRODES

## (undated) DEVICE — KT ORCA ORCAPOD DISP STRL

## (undated) DEVICE — CATH DIAG EXPO M/ PK 5F FL4/FR4 PIG

## (undated) DEVICE — LUBE JELLY FOIL PACK 1.4 OZ: Brand: MEDLINE INDUSTRIES, INC.

## (undated) DEVICE — GW PERIPH GUIDERIGHT STD/EXCHNG/J/TIP SS 0.035IN 5X260CM

## (undated) DEVICE — SOLIDIFIER LIQ PREMISORB 1500CC

## (undated) DEVICE — CVR PROB ULTRASND/TRANSD W/GEL 7X11IN STRL

## (undated) DEVICE — SAFELINER SUCTION CANISTER 1000CC: Brand: DEROYAL

## (undated) DEVICE — SYR LUERLOK 50ML

## (undated) DEVICE — GLIDESHEATH SLENDER STAINLESS STEEL KIT: Brand: GLIDESHEATH SLENDER

## (undated) DEVICE — CONTN GRAD MEAS TRIANG 32OZ BLK

## (undated) DEVICE — TUBING, SUCTION, 1/4" X 10', STRAIGHT: Brand: MEDLINE

## (undated) DEVICE — FIRST STEP BEDSIDE ADD WATER KIT - RESEALABLE STAND-UP POUCH, ENDOSCOPIC CLEANING PAD - 1 POUCH: Brand: FIRST STEP BEDSIDE ADD WATER KIT - RESEALABLE STAND-UP POUCH, ENDOSCOPIC CLEANIN

## (undated) DEVICE — MODEL BT2000 P/N 700287-012KIT CONTENTS: MANIFOLD WITH SALINE AND CONTRAST PORTS, SALINE TUBING WITH SPIKE AND HAND SYRINGE, TRANSDUCER: Brand: BT2000 AUTOMATED MANIFOLD KIT

## (undated) DEVICE — HYBRID CO2 TUBING/CAP SET FOR OLYMPUS® SCOPES & CO2 SOURCE: Brand: ERBE

## (undated) DEVICE — CATH DIAG EXPO .045 FL3  5F 100CM